# Patient Record
Sex: FEMALE | Race: WHITE | HISPANIC OR LATINO | Employment: UNEMPLOYED | URBAN - METROPOLITAN AREA
[De-identification: names, ages, dates, MRNs, and addresses within clinical notes are randomized per-mention and may not be internally consistent; named-entity substitution may affect disease eponyms.]

---

## 2017-03-07 RX ORDER — LANSOPRAZOLE 30 MG/1
CAPSULE, DELAYED RELEASE ORAL
Qty: 90 CAPSULE | Refills: 1 | OUTPATIENT
Start: 2017-03-07

## 2017-03-09 ENCOUNTER — TELEPHONE (OUTPATIENT)
Dept: FAMILY MEDICINE | Facility: CLINIC | Age: 42
End: 2017-03-09

## 2017-03-09 DIAGNOSIS — Z00.00 LABORATORY EXAM ORDERED AS PART OF ROUTINE GENERAL MEDICAL EXAMINATION: ICD-10-CM

## 2017-03-09 DIAGNOSIS — I10 BENIGN ESSENTIAL HTN: Primary | ICD-10-CM

## 2017-03-09 NOTE — TELEPHONE ENCOUNTER
Pt has appt soon and wants to do labs on Saturday. Orders entered. Left message that pt sched at [place] 3-11-17.--lp

## 2017-03-11 ENCOUNTER — LAB VISIT (OUTPATIENT)
Dept: LAB | Facility: HOSPITAL | Age: 42
End: 2017-03-11
Attending: FAMILY MEDICINE
Payer: COMMERCIAL

## 2017-03-11 DIAGNOSIS — Z00.00 LABORATORY EXAM ORDERED AS PART OF ROUTINE GENERAL MEDICAL EXAMINATION: ICD-10-CM

## 2017-03-11 DIAGNOSIS — I10 BENIGN ESSENTIAL HTN: ICD-10-CM

## 2017-03-11 LAB
ALBUMIN SERPL BCP-MCNC: 3.2 G/DL
ALP SERPL-CCNC: 70 U/L
ALT SERPL W/O P-5'-P-CCNC: 48 U/L
ANION GAP SERPL CALC-SCNC: 10 MMOL/L
AST SERPL-CCNC: 42 U/L
BASOPHILS # BLD AUTO: 0.01 K/UL
BASOPHILS NFR BLD: 0.1 %
BILIRUB SERPL-MCNC: 0.5 MG/DL
BUN SERPL-MCNC: 8 MG/DL
CALCIUM SERPL-MCNC: 9.1 MG/DL
CHLORIDE SERPL-SCNC: 105 MMOL/L
CHOLEST/HDLC SERPL: 3.6 {RATIO}
CO2 SERPL-SCNC: 24 MMOL/L
CREAT SERPL-MCNC: 0.7 MG/DL
DIFFERENTIAL METHOD: NORMAL
EOSINOPHIL # BLD AUTO: 0.1 K/UL
EOSINOPHIL NFR BLD: 1.8 %
ERYTHROCYTE [DISTWIDTH] IN BLOOD BY AUTOMATED COUNT: 14.2 %
EST. GFR  (AFRICAN AMERICAN): >60 ML/MIN/1.73 M^2
EST. GFR  (NON AFRICAN AMERICAN): >60 ML/MIN/1.73 M^2
GLUCOSE SERPL-MCNC: 129 MG/DL
HCT VFR BLD AUTO: 41.1 %
HDL/CHOLESTEROL RATIO: 28.1 %
HDLC SERPL-MCNC: 178 MG/DL
HDLC SERPL-MCNC: 50 MG/DL
HGB BLD-MCNC: 13.4 G/DL
LDLC SERPL CALC-MCNC: 103 MG/DL
LYMPHOCYTES # BLD AUTO: 2.6 K/UL
LYMPHOCYTES NFR BLD: 35.7 %
MCH RBC QN AUTO: 29.2 PG
MCHC RBC AUTO-ENTMCNC: 32.6 %
MCV RBC AUTO: 90 FL
MONOCYTES # BLD AUTO: 0.5 K/UL
MONOCYTES NFR BLD: 6.7 %
NEUTROPHILS # BLD AUTO: 4.1 K/UL
NEUTROPHILS NFR BLD: 55.4 %
NONHDLC SERPL-MCNC: 128 MG/DL
PLATELET # BLD AUTO: 285 K/UL
PMV BLD AUTO: 11 FL
POTASSIUM SERPL-SCNC: 4 MMOL/L
PROT SERPL-MCNC: 7.7 G/DL
RBC # BLD AUTO: 4.59 M/UL
SODIUM SERPL-SCNC: 139 MMOL/L
TRIGL SERPL-MCNC: 125 MG/DL
WBC # BLD AUTO: 7.34 K/UL

## 2017-03-11 PROCEDURE — 80053 COMPREHEN METABOLIC PANEL: CPT

## 2017-03-11 PROCEDURE — 80061 LIPID PANEL: CPT

## 2017-03-11 PROCEDURE — 36415 COLL VENOUS BLD VENIPUNCTURE: CPT | Mod: PO

## 2017-03-11 PROCEDURE — 83036 HEMOGLOBIN GLYCOSYLATED A1C: CPT

## 2017-03-11 PROCEDURE — 85025 COMPLETE CBC W/AUTO DIFF WBC: CPT

## 2017-03-13 LAB
ESTIMATED AVG GLUCOSE: 134 MG/DL
HBA1C MFR BLD HPLC: 6.3 %

## 2017-03-14 ENCOUNTER — PATIENT MESSAGE (OUTPATIENT)
Dept: FAMILY MEDICINE | Facility: CLINIC | Age: 42
End: 2017-03-14

## 2017-03-15 ENCOUNTER — DOCUMENTATION ONLY (OUTPATIENT)
Dept: ADMINISTRATIVE | Facility: HOSPITAL | Age: 42
End: 2017-03-15

## 2017-03-15 NOTE — PROGRESS NOTES
PRE-VISIT CHART REVIEW    Appointment Scheduled on 3/16/17    Department stratifications & guidelines reviewed:yes    Target Chronic Diagnosis: HTN, obesity    Chronic Diagnosis Intervention Due: no    Goals Updated:Yes    Health Maintenance Due   Topic Date Due    Influenza Vaccine  08/01/2016    Pap Smear  08/15/2016       Advanced Directives:   65 years of age or older?  No  Directive on file?  N\A                                      Pre-visit patient communication:  In Person    Studies or screenings scheduled pre-visit: no    Educate patient on HTN (122/84), obesity (33.05)

## 2017-03-16 ENCOUNTER — TELEPHONE (OUTPATIENT)
Dept: FAMILY MEDICINE | Facility: CLINIC | Age: 42
End: 2017-03-16

## 2017-03-16 ENCOUNTER — OFFICE VISIT (OUTPATIENT)
Dept: FAMILY MEDICINE | Facility: CLINIC | Age: 42
End: 2017-03-16
Payer: COMMERCIAL

## 2017-03-16 VITALS
SYSTOLIC BLOOD PRESSURE: 137 MMHG | BODY MASS INDEX: 33.73 KG/M2 | HEIGHT: 64 IN | TEMPERATURE: 98 F | DIASTOLIC BLOOD PRESSURE: 93 MMHG | HEART RATE: 79 BPM | RESPIRATION RATE: 20 BRPM | WEIGHT: 197.56 LBS

## 2017-03-16 DIAGNOSIS — R79.89 ELEVATED LFTS: ICD-10-CM

## 2017-03-16 DIAGNOSIS — Z00.00 ANNUAL PHYSICAL EXAM: Primary | ICD-10-CM

## 2017-03-16 DIAGNOSIS — Z12.4 PAP SMEAR FOR CERVICAL CANCER SCREENING: ICD-10-CM

## 2017-03-16 DIAGNOSIS — R73.03 PREDIABETES: ICD-10-CM

## 2017-03-16 PROCEDURE — 90686 IIV4 VACC NO PRSV 0.5 ML IM: CPT | Mod: S$GLB,,, | Performed by: FAMILY MEDICINE

## 2017-03-16 PROCEDURE — 90471 IMMUNIZATION ADMIN: CPT | Mod: S$GLB,,, | Performed by: FAMILY MEDICINE

## 2017-03-16 PROCEDURE — 81002 URINALYSIS NONAUTO W/O SCOPE: CPT | Mod: S$GLB,,, | Performed by: FAMILY MEDICINE

## 2017-03-16 PROCEDURE — 88175 CYTOPATH C/V AUTO FLUID REDO: CPT

## 2017-03-16 PROCEDURE — 3075F SYST BP GE 130 - 139MM HG: CPT | Mod: S$GLB,,, | Performed by: FAMILY MEDICINE

## 2017-03-16 PROCEDURE — 3080F DIAST BP >= 90 MM HG: CPT | Mod: S$GLB,,, | Performed by: FAMILY MEDICINE

## 2017-03-16 PROCEDURE — 99396 PREV VISIT EST AGE 40-64: CPT | Mod: 25,S$GLB,, | Performed by: FAMILY MEDICINE

## 2017-03-16 RX ORDER — TRIAMTERENE/HYDROCHLOROTHIAZID 37.5-25 MG
1 TABLET ORAL DAILY
Qty: 30 TABLET | Refills: 2 | Status: SHIPPED | OUTPATIENT
Start: 2017-03-16 | End: 2017-07-25 | Stop reason: SDUPTHER

## 2017-03-16 NOTE — TELEPHONE ENCOUNTER
----- Message from Kathleen Santiago sent at 3/16/2017 12:20 PM CDT -----  Patient stated pharmacy needs authorization for prescription for diet medication: Contrave/uses [facility] on [address] in [place]/please order or if any questions call back at [phone].

## 2017-03-16 NOTE — MR AVS SNAPSHOT
[facility]  [address]  [place] [address]  Phone: [phone]  Fax: [phone]                  Ana Dorantes   3/16/2017 10:30 AM   Office Visit    Description:  Female : 1975   Provider:  Gina Barrios MD   Department:  [facility]           Reason for Visit     Annual Exam           Diagnoses this Visit        Comments    Annual physical exam    -  Primary     Prediabetes         Elevated LFTs         BMI 33.0-33.9,adult         Pap smear for cervical cancer screening                To Do List           Future Appointments        Provider Department Dept Phone    6/15/2017 10:15 AM [facility] [place] [facility] [phone]      Goals (5 Years of Data)              Today    3/14/16    8/21/15    Blood Pressure < 140/90   137/93  137/93  137/93    BMI is less than 25       On track       These Medications        Disp Refills Start End    triamterene-hydrochlorothiazide 37.5-25 mg (MAXZIDE-25) 37.5-25 mg per tablet 30 tablet 2 3/16/2017 3/16/2018    Take 1 tablet by mouth once daily. - Oral    Pharm[id number] [place] [address] Ph #: [phone]       ranitidine (ZANTAC) 300 MG tablet 30 tablet 2 3/16/2017 3/16/2018    Take 1 tablet (300 mg total) by mouth every evening. - Oral    Pharmacy: [facility]/pharmacy #[id number]  [place] [address] Ph #: [phone]       naltrexone-bupropion 8-90 mg TbSR 120 tablet 0 3/16/2017     One tablet once daily in the morning for 1 week; at week 2, increase to 1 tablet twice daily administered in the morning and evening and continue for 1 week; at week 3, increase to 2 tablets in the morning and 1 tablet in the evening and continue for 1 week; at week 4, increase to 2 tablets twice daily administered in the morning and evening and continue for the remainder of the treatment course    Pharmacy: [id number] [place] [address]   #: [phone]         [facility]     [facility] Care [facility] - 24/7 Assistance  Registered nurses in the [facility] provide clinical advisement, health education, appointment booking, and other advisory services.  Call for this free service at [phone].             Medications           Message regarding Medications     Verify the changes and/or additions to your medication regime listed below are the same as discussed with your clinician today.  If any of these changes or additions are incorrect, please notify your healthcare provider.        START taking these NEW medications        Refills    triamterene-hydrochlorothiazide 37.5-25 mg (MAXZIDE-25) 37.5-25 mg per tablet 2    Sig: Take 1 tablet by mouth once daily.    Class: Normal    Route: Oral    ranitidine (ZANTAC) 300 MG tablet 2    Sig: Take 1 tablet (300 mg total) by mouth every evening.    Class: Normal    Route: Oral    naltrexone-bupropion 8-90 mg TbSR 0    Sig: One tablet once daily in the morning for 1 week; at week 2, increase to 1 tablet twice daily administered in the morning and evening and continue for 1 week; at week 3, increase to 2 tablets in the morning and 1 tablet in the evening and continue for 1 week; at week 4, increase to 2 tablets twice daily administered in the morning and evening and continue for the remainder of the treatment course    Class: Print      STOP taking these medications     hydrochlorothiazide (MICROZIDE) 12.5 mg capsule TAKE 1 CAPSULE EVERY DAY    hydrochlorothiazide (MICROZIDE) 12.5 mg capsule TAKE 1 CAPSULE EVERY DAY           Verify that the below list of medications is an accurate representation of the medications you are currently taking.  If none reported, the list may be blank. If incorrect, please contact your healthcare provider. Carry this list with you in case of emergency.           Current Medications     fluticasone (FLONASE) 50 mcg/actuation nasal spray 2 sprays by Each  "Nare route once daily.    lansoprazole (PREVACID) 30 MG capsule TAKE 1 CAPSULE (30 MG TOTAL) BY MOUTH ONCE DAILY.    naltrexone-bupropion 8-90 mg TbSR One tablet once daily in the morning for 1 week; at week 2, increase to 1 tablet twice daily administered in the morning and evening and continue for 1 week; at week 3, increase to 2 tablets in the morning and 1 tablet in the evening and continue for 1 week; at week 4, increase to 2 tablets twice daily administered in the morning and evening and continue for the remainder of the treatment course    ranitidine (ZANTAC) 300 MG tablet Take 1 tablet (300 mg total) by mouth every evening.    triamterene-hydrochlorothiazide 37.5-25 mg (MAXZIDE-25) 37.5-25 mg per tablet Take 1 tablet by mouth once daily.           Clinical Reference Information           Your Vitals Were     BP Pulse Temp Resp Height Weight    137/93 79 97.7 °F (36.5 °C) 20 5' 4" (1.626 m) 89.6 kg (197 lb 8.5 oz)    Last Period BMI             02/23/2017 33.91 kg/m2         Blood Pressure          Most Recent Value    BP  (!)  137/93      Allergies as of 3/16/2017     No Known Drug Allergies      Immunizations Administered on Date of Encounter - 3/16/2017     None      Orders Placed During Today's Visit      Normal Orders This Visit    Liquid-based pap smear, screening     Future Labs/Procedures Expected by Expires    Hemoglobin A1c  3/16/2017 3/17/2018    Hepatic function panel  3/16/2017 3/17/2018      Language Assistance Services     ATTENTION: Language assistance services are available, free of charge. Please call [phone].      ATENCIÓN: Si habla español, tiene a hester disposición servicios gratuitos de asistencia lingüística. Llame al [phone].     CHÚ Ý: N?u b?n nói Ti?ng Vi?t, có các d?ch v? h? tr? ngôn ng? mi?n phí dành cho b?n. G?i s? [phone].         [facility] complies with applicable Federal civil rights laws and does not discriminate on the basis of race, " color, national origin, age, disability, or sex.

## 2017-03-18 NOTE — PROGRESS NOTES
Subjective:       Patient ID: Ana Dorantes is a 41 y.o. female.    Chief Complaint: Annual Exam (with a pap and urinalysis)    HPI   The patient is a 41-year-old who is here today for her annual exam.  She would like to have her Pap smear done.  She did have one prior abnormal Pap smear but when it was repeated it was normal with no treatment needed.  She is having her menstrual cycles regularly.  Her  has previously had a vasectomy.  She recently had labs which were reviewed today.  She is trying to watch her diet and exercise but is frustrated she's not been able to lose weight.  She wonders about a medication to help her lose weight.    Today we also discussed the followin)  hypertension.  Her blood pressure is high today.  She is currently taking HCTZ 12.5 mg once a day  2)  prediabetes.  We did discuss her recent A1c of 6.3 and her fasting blood 129.  She denies any polyuria, polydipsia or polyphagia  3)  SONI.  She is using Prevacid several times throughout the week.  We did discuss moving to a non-PPI medication which she would be interested in doing   4)  elevated LFTs.  We did discuss her recent slightly elevated LFTs and the fact that this is likely linked to the fatty liver previously noted on ultrasound.      Review of Systems   Constitutional: Negative for appetite change, chills, diaphoresis, fatigue, fever and unexpected weight change.   HENT: Negative for congestion, dental problem, ear pain, hearing loss, postnasal drip, rhinorrhea, sneezing, sore throat and trouble swallowing.    Eyes: Negative for photophobia, pain, discharge and visual disturbance.   Respiratory: Negative for cough, chest tightness, shortness of breath and wheezing.    Cardiovascular: Negative for chest pain, palpitations and leg swelling.   Gastrointestinal: Negative for abdominal distention, abdominal pain, blood in stool, constipation, diarrhea, nausea and vomiting.   Endocrine: Negative for cold intolerance, heat  intolerance, polydipsia and polyuria.   Genitourinary: Negative for dysuria, flank pain, frequency, genital sores, hematuria, menstrual problem and vaginal discharge.   Musculoskeletal: Negative for arthralgias, joint swelling and myalgias.   Skin: Negative for rash.   Neurological: Negative for dizziness, syncope, light-headedness and headaches.   Hematological: Negative for adenopathy. Does not bruise/bleed easily.   Psychiatric/Behavioral: Negative for dysphoric mood, self-injury, sleep disturbance and suicidal ideas. The patient is not nervous/anxious.        Objective:      Physical Exam   Constitutional: She is oriented to person, place, and time. She appears well-developed and well-nourished. No distress.   HENT:   Head: Normocephalic and atraumatic.   Right Ear: Hearing, tympanic membrane, external ear and ear canal normal.   Left Ear: Hearing, tympanic membrane, external ear and ear canal normal.   Nose: Nose normal.   Mouth/Throat: Oropharynx is clear and moist and mucous membranes are normal. No oral lesions. No oropharyngeal exudate, posterior oropharyngeal edema or posterior oropharyngeal erythema.   Eyes: Conjunctivae, EOM and lids are normal. Pupils are equal, round, and reactive to light. No scleral icterus.   Neck: Normal range of motion. Neck supple. Carotid bruit is not present. No thyroid mass and no thyromegaly present.   Cardiovascular: Normal rate, regular rhythm and normal heart sounds.   No extrasystoles are present. PMI is not displaced.  Exam reveals no gallop.    No murmur heard.  Pulses:       Dorsalis pedis pulses are 2+ on the right side, and 2+ on the left side.        Posterior tibial pulses are 2+ on the right side, and 2+ on the left side.   Pulmonary/Chest: Effort normal and breath sounds normal. No accessory muscle usage. No respiratory distress.   Clear to auscultation bilaterally.   Abdominal: Soft. Normal appearance and bowel sounds are normal. She exhibits no abdominal bruit.  "There is no hepatosplenomegaly. There is no tenderness. There is no rebound.   Genitourinary: Pelvic exam was performed with patient supine. There is no rash or lesion on the right labia. There is no rash or lesion on the left labia.   Genitourinary Comments: External genitalia appear normal.  Speculum is inserted.  Vaginal mucosa is normal.  Cervix is visualized and appears normal.  Pap is taken.  Bimanual exam reveals a normal sized uterus and ovaries which are non tender.   Feet:   Right Foot:   Skin Integrity: Positive for warmth and dry skin.   Lymphadenopathy:        Head (right side): No submental, no submandibular, no preauricular, no posterior auricular and no occipital adenopathy present.        Head (left side): No submental, no submandibular, no preauricular and no occipital adenopathy present.     She has no cervical adenopathy.        Right cervical: No superficial cervical, no deep cervical and no posterior cervical adenopathy present.       Left cervical: No superficial cervical, no deep cervical and no posterior cervical adenopathy present.     She has no axillary adenopathy.        Right: No supraclavicular adenopathy present.        Left: No supraclavicular adenopathy present.   Neurological: She is alert and oriented to person, place, and time. She has normal strength. No cranial nerve deficit or sensory deficit.   Skin: Skin is warm, dry and intact. No cyanosis. Nails show no clubbing.   Psychiatric: She has a normal mood and affect. Her speech is normal and behavior is normal. Thought content normal. Cognition and memory are normal.   Breast:  The breasts appear symmetric.  There is no nipple discharge or nipple inversion noted.  There are no masses palpable throughout either breast.  There is no supraclavicular or axillary lymphadenopathy.    Blood pressure (!) 137/93, pulse 79, temperature 97.7 °F (36.5 °C), resp. rate 20, height 5' 4" (1.626 m), weight 89.6 kg (197 lb 8.5 oz), last menstrual " period 02/23/2017.Body mass index is 33.91 kg/(m^2).        A/P:  1)  annual exam.  Health maintenance issues and anticipatory guidance issues were discussed.  Immunizations are up-to-date.  If her Pap smear is normal today we will repeat this in 3 years.  She will work on diet exercise and weight loss  2)  obesity with a BMI of 33.  We did discuss treatment options.  In the end, she wanted to try contrave.  We will see her back in 3 months to see how she is doing with this   3)  hypertension.  Uncontrolled.  We will change her blood pressure medicine to Maxide 37.5/25 mg once a day  4)  prediabetes.  Progressive.  She will work on diet exercise and weight loss to prevent the development of diabetes in the future.  We will recheck this in 3 months  5)  SONI.  Intermittent.  She'll work harder on lifestyle interventions to control her acid reflux symptoms.  We will stop the Prevacid and try Zantac 300 milligrams once a day as needed  6)  elevated LFTs likely due to fatty liver.  Mild.  We will recheck this in 3 months

## 2017-03-22 ENCOUNTER — PATIENT MESSAGE (OUTPATIENT)
Dept: FAMILY MEDICINE | Facility: CLINIC | Age: 42
End: 2017-03-22

## 2017-03-23 LAB
BILIRUB SERPL-MCNC: NORMAL MG/DL
BLOOD URINE, POC: NORMAL
COLOR, POC UA: CLEAR
GLUCOSE UR QL STRIP: NORMAL
KETONES UR QL STRIP: NORMAL
LEUKOCYTE ESTERASE URINE, POC: NORMAL
NITRITE, POC UA: NORMAL
PH, POC UA: 5
PROTEIN, POC: NORMAL
SPECIFIC GRAVITY, POC UA: 1.02
UROBILINOGEN, POC UA: NORMAL

## 2017-03-27 NOTE — TELEPHONE ENCOUNTER
Contrave has been approved for 4 mths.  Spoke w/ Oseas at [facility].--lp

## 2017-03-30 ENCOUNTER — TELEPHONE (OUTPATIENT)
Dept: FAMILY MEDICINE | Facility: CLINIC | Age: 42
End: 2017-03-30

## 2017-03-30 NOTE — TELEPHONE ENCOUNTER
Spoke with pt. Rescheduled nurse visit as pt did not want to drive in the weather. Pt verbalized understanding to new appointment day & time.

## 2017-03-30 NOTE — TELEPHONE ENCOUNTER
----- Message from Alyssa Sapp sent at 3/30/2017  9:48 AM CDT -----  Contact: self [phone]  She is requesting that you call her to reschedule the BP check appt.  She had to cancel due to the weather.  Thank you!

## 2017-04-06 ENCOUNTER — PATIENT MESSAGE (OUTPATIENT)
Dept: FAMILY MEDICINE | Facility: CLINIC | Age: 42
End: 2017-04-06

## 2017-04-10 ENCOUNTER — PATIENT MESSAGE (OUTPATIENT)
Dept: FAMILY MEDICINE | Facility: CLINIC | Age: 42
End: 2017-04-10

## 2017-04-12 ENCOUNTER — DOCUMENTATION ONLY (OUTPATIENT)
Dept: FAMILY MEDICINE | Facility: CLINIC | Age: 42
End: 2017-04-12

## 2017-04-13 VITALS — DIASTOLIC BLOOD PRESSURE: 85 MMHG | SYSTOLIC BLOOD PRESSURE: 121 MMHG

## 2017-04-28 ENCOUNTER — PATIENT MESSAGE (OUTPATIENT)
Dept: FAMILY MEDICINE | Facility: CLINIC | Age: 42
End: 2017-04-28

## 2017-05-02 RX ORDER — NALTREXONE HYDROCHLORIDE AND BUPROPION HYDROCHLORIDE 8; 90 MG/1; MG/1
TABLET, EXTENDED RELEASE ORAL
Qty: 120 TABLET | Refills: 0 | OUTPATIENT
Start: 2017-05-02

## 2017-05-03 ENCOUNTER — PATIENT MESSAGE (OUTPATIENT)
Dept: FAMILY MEDICINE | Facility: CLINIC | Age: 42
End: 2017-05-03

## 2017-05-03 RX ORDER — SULFAMETHOXAZOLE AND TRIMETHOPRIM 800; 160 MG/1; MG/1
1 TABLET ORAL 2 TIMES DAILY
Qty: 6 TABLET | Refills: 0 | Status: SHIPPED | OUTPATIENT
Start: 2017-05-03 | End: 2017-05-06

## 2017-05-04 ENCOUNTER — PATIENT MESSAGE (OUTPATIENT)
Dept: FAMILY MEDICINE | Facility: CLINIC | Age: 42
End: 2017-05-04

## 2017-05-04 NOTE — TELEPHONE ENCOUNTER
Spoke w/ Ana at [facility] , they have received rx and it is ready to be picked up.--lp

## 2017-06-19 ENCOUNTER — OFFICE VISIT (OUTPATIENT)
Dept: PRIMARY CARE CLINIC | Facility: CLINIC | Age: 42
End: 2017-06-19
Payer: COMMERCIAL

## 2017-06-19 VITALS
DIASTOLIC BLOOD PRESSURE: 90 MMHG | HEART RATE: 87 BPM | SYSTOLIC BLOOD PRESSURE: 138 MMHG | WEIGHT: 189.81 LBS | BODY MASS INDEX: 32.41 KG/M2 | HEIGHT: 64 IN | TEMPERATURE: 99 F | OXYGEN SATURATION: 95 %

## 2017-06-19 DIAGNOSIS — R10.13 EPIGASTRIC PAIN: Primary | ICD-10-CM

## 2017-06-19 PROCEDURE — 99999 PR PBB SHADOW E&M-EST. PATIENT-LVL IV: CPT | Mod: PBBFAC,,, | Performed by: NURSE PRACTITIONER

## 2017-06-19 PROCEDURE — 99214 OFFICE O/P EST MOD 30 MIN: CPT | Mod: S$GLB,,, | Performed by: NURSE PRACTITIONER

## 2017-06-19 RX ORDER — ESOMEPRAZOLE MAGNESIUM 40 MG/1
40 CAPSULE, DELAYED RELEASE ORAL DAILY PRN
COMMUNITY
End: 2018-03-16 | Stop reason: ALTCHOICE

## 2017-06-19 NOTE — MEDICAL/APP STUDENT
Subjective:       Patient ID: Ana Dorantes is a 42 y.o. female.    Chief Complaint: Back Pain (past 3-4 weeks) and Abdominal Pain  Ms. Galindo is here today due to acute abdominal and back pain that occurs in her epigastric region and under her posterior rib cage.  It is sporadic nature, although her  feels it maybe associated with eating. Currently, she is not able to finish a meal and takes about 3-4 bites prior to being full. Initially she thought the pain was associated with her menses, however it has continued. She cut out taking her Contrave for a few days to see if this was irritating her stomach and found no relief.     Back Pain   This is a new problem. The current episode started 1 to 4 weeks ago. The problem occurs 2 to 4 times per day. The problem has been gradually worsening since onset. The quality of the pain is described as cramping. Radiates to: Epigastric- in band like pattern. The pain is at a severity of 10/10. The pain is the same all the time. Associated symptoms include abdominal pain. Pertinent negatives include no bowel incontinence, chest pain, dysuria, fever, headaches or weight loss. Risk factors include obesity. She has tried heat for the symptoms. The treatment provided mild relief.   Abdominal Pain   This is a new problem. The current episode started 1 to 4 weeks ago. The onset quality is sudden. The problem occurs 2 to 4 times per day. The problem has been gradually worsening. The pain is located in the epigastric region. The pain is at a severity of 10/10. The quality of the pain is cramping and a sensation of fullness. The abdominal pain radiates to the back. Associated symptoms include anorexia and nausea. Pertinent negatives include no arthralgias, belching, constipation, diarrhea, dysuria, fever, flatus, frequency, headaches, hematochezia, hematuria, myalgias, vomiting or weight loss. Nothing aggravates the pain. The pain is relieved by nothing. She has tried antacids,  proton pump inhibitors and H2 blockers for the symptoms. The treatment provided no relief. Her past medical history is significant for abdominal surgery.     Review of Systems   Constitutional: Positive for appetite change. Negative for activity change, chills, diaphoresis, fatigue, fever and weight loss.   HENT: Negative for postnasal drip, sore throat, tinnitus, trouble swallowing and voice change.    Respiratory: Negative for cough, chest tightness and shortness of breath.    Cardiovascular: Positive for palpitations. Negative for chest pain.   Gastrointestinal: Positive for abdominal distention, abdominal pain, anorexia and nausea. Negative for bowel incontinence, constipation, diarrhea, flatus, hematochezia and vomiting.   Genitourinary: Negative for dysuria, flank pain, frequency and hematuria.   Musculoskeletal: Positive for back pain. Negative for arthralgias and myalgias.   Allergic/Immunologic: Negative for environmental allergies, food allergies and immunocompromised state.   Neurological: Negative for headaches.       Objective:      Physical Exam   Constitutional: She is oriented to person, place, and time. She appears well-developed and well-nourished. No distress.   HENT:   Head: Normocephalic and atraumatic.   Eyes: Pupils are equal, round, and reactive to light.   Neck: Normal range of motion. Neck supple.   Cardiovascular: Regular rhythm, normal heart sounds and intact distal pulses.    Pulmonary/Chest: Effort normal and breath sounds normal.   Abdominal: Soft. Normal appearance and bowel sounds are normal. She exhibits no distension and no mass. There is tenderness in the epigastric area and left upper quadrant. There is no rigidity, no rebound, no guarding and no CVA tenderness. No hernia.       Neurological: She is alert and oriented to person, place, and time.   Skin: Skin is warm and dry. She is not diaphoretic.   Vitals reviewed.      Assessment:         ICD-10-CM ICD-9-CM   1. Epigastric  pain R10.13 789.06       Plan:       Epigastric pain  -     US Abdomen Limited; Future; Expected date: 06/19/2017    -Discussion on lack of specific exam finding.  -Education provided on use of H2 blocker and/or PPI. Encouraged daily use to help eliminate possible symptoms of gastric irritation and GERD.  -Ultrasound ordered to eliminate gallstones as origin of pain.

## 2017-06-19 NOTE — PATIENT INSTRUCTIONS
Recommendations for reduction of or relief from reflux/stomach lining inflammation are below.  Avoid:  Caffeine  Eating less than 1.5 hrs before bedtime.  Mints  Chocolates  Alcohol  Smoking  Any food that increases the abdominal pain.    Eat smaller, more frequent meals.    Take the following medications: prilosec.  It is best to take it 30 minutes prior to eating in the morning.  You may add zantac 150 mg once or twice a day, if desired.     If this does not resolve your symptoms, or if symptoms worsen, call your PCP for further evaluation or call us for a GI referral.    Thank you for using the [facility]!    HOLLIE Mason, CNP, FNP-BC  [facility]  [facility]

## 2017-06-19 NOTE — Clinical Note
Gina Barrios MD,  I saw your patient today in the [facility].  If you have any questions, please do not hesitate to contact me.  Thank you!  JAZLYN Mason

## 2017-06-22 ENCOUNTER — HOSPITAL ENCOUNTER (OUTPATIENT)
Dept: RADIOLOGY | Facility: HOSPITAL | Age: 42
Discharge: HOME | End: 2017-06-22
Attending: NURSE PRACTITIONER
Payer: COMMERCIAL

## 2017-06-22 DIAGNOSIS — R10.13 EPIGASTRIC PAIN: ICD-10-CM

## 2017-06-22 PROCEDURE — 76705 ECHO EXAM OF ABDOMEN: CPT | Mod: TC,PO

## 2017-06-22 PROCEDURE — 76705 ECHO EXAM OF ABDOMEN: CPT | Mod: 26,,, | Performed by: RADIOLOGY

## 2017-06-22 NOTE — PROGRESS NOTES
Subjective:       Patient ID: Ana Dorantes is a 42 y.o. female.     Chief Complaint: Back Pain (past 3-4 weeks) and Abdominal Pain  Ms. Galindo is here today due to acute abdominal and back pain that occurs in her epigastric region and under her posterior rib cage.  It is sporadic nature, although her  feels it maybe associated with eating. Currently, she is not able to finish a meal and takes about 3-4 bites prior to being full. Initially she thought the pain was associated with her menses, however it has continued. She cut out taking her Contrave for a few days to see if this was irritating her stomach and found no relief.      Back Pain   This is a new problem. The current episode started 1 to 4 weeks ago. The problem occurs 2 to 4 times per day. The problem has been gradually worsening since onset. The quality of the pain is described as cramping. Radiates to: Epigastric- in band like pattern. The pain is at a severity of 10/10. The pain is the same all the time. Associated symptoms include abdominal pain. Pertinent negatives include no bowel incontinence, chest pain, dysuria, fever, headaches or weight loss. Risk factors include obesity. She has tried heat for the symptoms. The treatment provided mild relief.   Abdominal Pain   This is a new problem. The current episode started 1 to 4 weeks ago. The onset quality is sudden. The problem occurs 2 to 4 times per day. The problem has been gradually worsening. The pain is located in the epigastric region. The pain is at a severity of 10/10. The quality of the pain is cramping and a sensation of fullness. The abdominal pain radiates to the back. Associated symptoms include anorexia and nausea. Pertinent negatives include no arthralgias, belching, constipation, diarrhea, dysuria, fever, flatus, frequency, headaches, hematochezia, hematuria, myalgias, vomiting or weight loss. Nothing aggravates the pain. The pain is relieved by nothing. She has tried  antacids, proton pump inhibitors and H2 blockers for the symptoms. The treatment provided no relief. Her past medical history is significant for abdominal surgery.      Review of Systems   Constitutional: Positive for appetite change. Negative for activity change, chills, diaphoresis, fatigue, fever and weight loss.   HENT: Negative for postnasal drip, sore throat, tinnitus, trouble swallowing and voice change.    Respiratory: Negative for cough, chest tightness and shortness of breath.    Cardiovascular: Positive for palpitations. Negative for chest pain.   Gastrointestinal: Positive for abdominal distention, abdominal pain, anorexia and nausea. Negative for bowel incontinence, constipation, diarrhea, flatus, hematochezia and vomiting.   Genitourinary: Negative for dysuria, flank pain, frequency and hematuria.   Musculoskeletal: Positive for back pain. Negative for arthralgias and myalgias.   Allergic/Immunologic: Negative for environmental allergies, food allergies and immunocompromised state.   Neurological: Negative for headaches.       Objective:      Physical Exam   Constitutional: She is oriented to person, place, and time. She appears well-developed and well-nourished. No distress.   HENT:   Head: Normocephalic and atraumatic.   Eyes: Pupils are equal, round, and reactive to light.   Neck: Normal range of motion. Neck supple.   Cardiovascular: Regular rhythm, normal heart sounds and intact distal pulses.    Pulmonary/Chest: Effort normal and breath sounds normal.   Abdominal: Soft. Normal appearance and bowel sounds are normal. She exhibits no distension and no mass. There is tenderness in the epigastric area and left upper quadrant. There is no rigidity, no rebound, no guarding and no CVA tenderness. No hernia.       Neurological: She is alert and oriented to person, place, and time.   Skin: Skin is warm and dry. She is not diaphoretic.   Vitals:    06/19/17 1233   BP: (!) 138/90   BP Location: Right arm  "  Patient Position: Sitting   BP Method: Manual   Pulse: 87   Temp: 98.5 °F (36.9 °C)   TempSrc: Oral   SpO2: 95%   Weight: 86.1 kg (189 lb 13.1 oz)   Height: 5' 4" (1.626 m)       Vitals reviewed.      Assessment:       Epigastric pain  -     US Abdomen Limited; Future; Expected date: 06/19/2017      Plan:       Pt today presents with 3-4 week of intermittent but sharp and severe (10/10) epigastric and back pain that seems to occur after eating only a few bites.      This is a new problem to me and the following work up is planned.    Lab & Radiological Tests Ordered: US abd.  The results are pending.    -Discussion on lack of specific exam finding.  -Education provided on use of H2 blocker and/or PPI. Encouraged daily use to help eliminate possible symptoms of gastric irritation and GERD.  -Ultrasound ordered to eliminate gallstones as origin of pain.    Pt advised to perform comfort measures recommended on patient instruction sheet .      If worse or concerns, the patient is advised to call us.   Explained exam findings, diagnosis and treatment plan to patient and her .  Questions answered and patient states understanding.            "

## 2017-07-07 RX ORDER — TRIAMTERENE/HYDROCHLOROTHIAZID 37.5-25 MG
1 TABLET ORAL DAILY
Qty: 30 TABLET | Refills: 2 | OUTPATIENT
Start: 2017-07-07 | End: 2018-07-07

## 2017-07-07 NOTE — TELEPHONE ENCOUNTER
Spoke w/ pt , pt feels fine. Pt did not request med ( pharmacy must have on auto-refill)  because she still has some.  She has not been checking her BP regularly. Pt will check her BP once she gets home and send us an e-mail.--lp

## 2017-07-18 ENCOUNTER — TELEPHONE (OUTPATIENT)
Dept: FAMILY MEDICINE | Facility: CLINIC | Age: 42
End: 2017-07-18

## 2017-07-18 DIAGNOSIS — Z12.39 ENCOUNTER FOR SCREENING FOR MALIGNANT NEOPLASM OF BREAST: Primary | ICD-10-CM

## 2017-07-18 NOTE — TELEPHONE ENCOUNTER
----- Message from Brooklyn Arreguin sent at 2017 12:57 PM CDT -----  Contact: self   [phone]  Patient called asking to get new orders for mammogram. Current orders for mammogram .Thanks!

## 2017-07-22 ENCOUNTER — HOSPITAL ENCOUNTER (OUTPATIENT)
Dept: RADIOLOGY | Facility: HOSPITAL | Age: 42
Discharge: HOME | End: 2017-07-22
Attending: FAMILY MEDICINE
Payer: COMMERCIAL

## 2017-07-22 VITALS — WEIGHT: 189.81 LBS | HEIGHT: 64 IN | BODY MASS INDEX: 32.41 KG/M2

## 2017-07-22 DIAGNOSIS — Z12.39 ENCOUNTER FOR SCREENING FOR MALIGNANT NEOPLASM OF BREAST: ICD-10-CM

## 2017-07-22 PROCEDURE — 77067 SCR MAMMO BI INCL CAD: CPT | Mod: 26,,, | Performed by: RADIOLOGY

## 2017-07-22 PROCEDURE — 77067 SCR MAMMO BI INCL CAD: CPT | Mod: TC

## 2017-07-22 PROCEDURE — 77063 BREAST TOMOSYNTHESIS BI: CPT | Mod: 26,,, | Performed by: RADIOLOGY

## 2017-07-24 ENCOUNTER — PATIENT MESSAGE (OUTPATIENT)
Dept: FAMILY MEDICINE | Facility: CLINIC | Age: 42
End: 2017-07-24

## 2017-07-26 ENCOUNTER — PATIENT MESSAGE (OUTPATIENT)
Dept: FAMILY MEDICINE | Facility: CLINIC | Age: 42
End: 2017-07-26

## 2017-07-26 RX ORDER — TRIAMTERENE/HYDROCHLOROTHIAZID 37.5-25 MG
1 TABLET ORAL DAILY
Qty: 30 TABLET | Refills: 2 | Status: SHIPPED | OUTPATIENT
Start: 2017-07-26 | End: 2017-08-02 | Stop reason: SDUPTHER

## 2017-07-27 ENCOUNTER — TELEPHONE (OUTPATIENT)
Dept: FAMILY MEDICINE | Facility: CLINIC | Age: 42
End: 2017-07-27

## 2017-07-28 ENCOUNTER — DOCUMENTATION ONLY (OUTPATIENT)
Dept: FAMILY MEDICINE | Facility: CLINIC | Age: 42
End: 2017-07-28

## 2017-07-28 VITALS — DIASTOLIC BLOOD PRESSURE: 78 MMHG | SYSTOLIC BLOOD PRESSURE: 124 MMHG

## 2017-07-28 RX ORDER — TRIAMTERENE/HYDROCHLOROTHIAZID 37.5-25 MG
1 TABLET ORAL DAILY
Qty: 30 TABLET | Refills: 2 | OUTPATIENT
Start: 2017-07-28 | End: 2018-07-28

## 2017-08-02 NOTE — TELEPHONE ENCOUNTER
----- Message from Noel Turk sent at 8/2/2017  3:39 PM CDT -----  Contact: patient  Patient called to advise that request for script was requesting last week,[facility] (triamterene).please send ,call back at [phone] when script has been sent. Thanks,

## 2017-08-03 RX ORDER — TRIAMTERENE/HYDROCHLOROTHIAZID 37.5-25 MG
1 TABLET ORAL DAILY
Qty: 30 TABLET | Refills: 2 | Status: SHIPPED | OUTPATIENT
Start: 2017-08-03 | End: 2017-08-30 | Stop reason: SDUPTHER

## 2017-08-31 RX ORDER — TRIAMTERENE/HYDROCHLOROTHIAZID 37.5-25 MG
1 TABLET ORAL DAILY
Qty: 90 TABLET | Refills: 0 | Status: SHIPPED | OUTPATIENT
Start: 2017-08-31 | End: 2018-01-19 | Stop reason: SDUPTHER

## 2017-09-07 ENCOUNTER — OFFICE VISIT (OUTPATIENT)
Dept: FAMILY MEDICINE | Facility: CLINIC | Age: 42
End: 2017-09-07
Payer: COMMERCIAL

## 2017-09-07 VITALS
BODY MASS INDEX: 33.37 KG/M2 | HEIGHT: 64 IN | RESPIRATION RATE: 16 BRPM | HEART RATE: 97 BPM | TEMPERATURE: 98 F | DIASTOLIC BLOOD PRESSURE: 78 MMHG | WEIGHT: 195.44 LBS | OXYGEN SATURATION: 98 % | SYSTOLIC BLOOD PRESSURE: 122 MMHG

## 2017-09-07 DIAGNOSIS — G47.33 OSA (OBSTRUCTIVE SLEEP APNEA): ICD-10-CM

## 2017-09-07 DIAGNOSIS — K13.21 LEUKOPLAKIA OF GINGIVA: ICD-10-CM

## 2017-09-07 DIAGNOSIS — L50.9 URTICARIA: Primary | ICD-10-CM

## 2017-09-07 DIAGNOSIS — I10 HYPERTENSION, ESSENTIAL: ICD-10-CM

## 2017-09-07 PROCEDURE — 3078F DIAST BP <80 MM HG: CPT | Mod: S$GLB,,, | Performed by: FAMILY MEDICINE

## 2017-09-07 PROCEDURE — 99214 OFFICE O/P EST MOD 30 MIN: CPT | Mod: S$GLB,,, | Performed by: FAMILY MEDICINE

## 2017-09-07 PROCEDURE — 3008F BODY MASS INDEX DOCD: CPT | Mod: S$GLB,,, | Performed by: FAMILY MEDICINE

## 2017-09-07 PROCEDURE — 3074F SYST BP LT 130 MM HG: CPT | Mod: S$GLB,,, | Performed by: FAMILY MEDICINE

## 2017-09-07 RX ORDER — EPINEPHRINE 0.3 MG/.3ML
1 INJECTION SUBCUTANEOUS ONCE
Qty: 2 DEVICE | Refills: 0 | Status: SHIPPED | OUTPATIENT
Start: 2017-09-07 | End: 2020-02-28 | Stop reason: SDUPTHER

## 2017-09-10 NOTE — PROGRESS NOTES
Subjective:       Patient ID: Ana Dorantes is a 42 y.o. female.    Chief Complaint: Allergic Reaction (state the last 3 times she ate eggs broke out in hives. Sleep apnea)    HPI     The patient is a 42-year-old who is here today to discuss several issues.  Today we discussed the followin)  egg ALLERGY.  She wonders if she has an egg ALLERGY.  Recently, she had an episode of itchiness after she ate an egg.  Initially, she did not attribute her itchiness to eating the egg but after some subsequent episodes she wonders if this was the start of her egg ALLERGY.  Approximately 3 days after she had the itchiness after eating the eggs, she ate 3 eggs and within 2-3 minutes, she developed hives which became widespread and she developed swelling of her lip.  She was about to go to the ER but a friend of hers who is a nurse suggested she take Benadryl and Zantac which did help.  A couple of days later, she ate eggs again and within 2 bites she developed a rash and swelling again.  She has never had any swelling of her tongue or throat or any shortness of breath.  She's never had problems like this before  2)  sleep apnea.  She wonders about having a sleep apnea evaluation done.  Her  has commented that she has been snoring and pausing in her breathing at night.  She has also occasionally woken up from sleep gasping for breath  3)  obesity.  She has not been able to lose weight.  She only took contrave for short period of time before deciding to stop it because she was traveling a lot over the summer and realized that she would have trouble losing the weight because of her travels.  She realizes that she does need to lose weight and wants to resume the contrave again after an upcoming trip she has planned.  We did discuss repeating labs to evaluate her cholesterol, prediabetes and elevated LFTs previously attributed to fatty liver disease but she wants to defer that until she has a chance to make a concerted  effort at weight  4)  hypertension.  She is doing well with the Maxide.  She has not felt as her blood pressure has been high or low    During her exam, I did comment about a white patch she has on her gums.  She has noticed this intermittently before and discussed it with her dentist but the dentist was not sure what this was    Review of Systems   Constitutional: Negative for activity change, appetite change, chills, diaphoresis, fatigue, fever and unexpected weight change.   HENT: Positive for rhinorrhea. Negative for congestion, ear pain, hearing loss, postnasal drip, sinus pressure, sneezing, sore throat and trouble swallowing.    Eyes: Negative for pain, discharge and visual disturbance.   Respiratory: Negative for cough, chest tightness, shortness of breath and wheezing.    Cardiovascular: Negative for chest pain, palpitations and leg swelling.   Gastrointestinal: Negative for abdominal distention, abdominal pain, blood in stool, constipation, diarrhea, nausea and vomiting.   Endocrine: Positive for polyuria. Negative for polydipsia.   Genitourinary: Negative for difficulty urinating, dysuria, hematuria and menstrual problem.   Musculoskeletal: Negative for arthralgias, joint swelling and neck pain.   Skin: Negative for rash.   Neurological: Negative for weakness and headaches.   Psychiatric/Behavioral: Negative for confusion and dysphoric mood.       Objective:      Physical Exam   Constitutional: She is oriented to person, place, and time. She appears well-developed and well-nourished. No distress.   HENT:   Head: Normocephalic and atraumatic.   Right Ear: Hearing, tympanic membrane, external ear and ear canal normal.   Left Ear: Hearing, tympanic membrane, external ear and ear canal normal.   Nose: Nose normal.   Mouth/Throat: Oropharynx is clear and moist and mucous membranes are normal. No oral lesions. No oropharyngeal exudate, posterior oropharyngeal edema or posterior oropharyngeal erythema.   She has  "a white patch on her gum over her top incisor consistent with leukoplakia    Eyes: Conjunctivae, EOM and lids are normal. Pupils are equal, round, and reactive to light. No scleral icterus.   Neck: Normal range of motion. Neck supple. Carotid bruit is not present. No thyroid mass and no thyromegaly present.   Cardiovascular: Normal rate, regular rhythm and normal heart sounds.   No extrasystoles are present. PMI is not displaced.  Exam reveals no gallop.    No murmur heard.  Pulmonary/Chest: Effort normal and breath sounds normal. No accessory muscle usage. No respiratory distress.   Clear to auscultation bilaterally.   Abdominal: Soft. Normal appearance and bowel sounds are normal. She exhibits no abdominal bruit. There is no hepatosplenomegaly. There is no tenderness. There is no rebound.   Lymphadenopathy:        Head (right side): No submental and no submandibular adenopathy present.        Head (left side): No submental and no submandibular adenopathy present.        Right cervical: No superficial cervical, no deep cervical and no posterior cervical adenopathy present.       Left cervical: No superficial cervical, no deep cervical and no posterior cervical adenopathy present.        Right: No supraclavicular adenopathy present.        Left: No supraclavicular adenopathy present.   Neurological: She is alert and oriented to person, place, and time.   Skin: Skin is warm, dry and intact.   Psychiatric: She has a normal mood and affect.     Blood pressure 122/78, pulse 97, temperature 98.4 °F (36.9 °C), temperature source Oral, resp. rate 16, height 5' 4.02" (1.626 m), weight 88.6 kg (195 lb 7 oz), last menstrual period 08/29/2017, SpO2 98 %.Body mass index is 33.53 kg/m².        A/P:  1)  probable egg ALLERGY.  New.  She will avoid consuming eggs.  We will refer her to the allergist.  I did give her an EpiPen with instructions on how to use this.  If she has a recurrent reaction, she will use her EpiPen and go to " the ER  2)  GIULIA.  Persistent.  We will refer her again to the sleep center for further evaluation  3)  obesity with a BMI of 33.  Persistent.  She will start contrave and will improve her diet, exercise regularly and lose weight.  We will recheck labs in 3 months  4)  hyperlipidemia.  Status unknown.  We will recheck fasting labs in 3 months  5)  prediabetes.  Status unknown.  We will check an A1c in 3 months  6)  elevated LFTs.  Status unknown.  We will recheck LFTs in 3 months  7)  hypertension.  Well-controlled.  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  We will refer her to ENT    I will see her back in 3 months or sooner if needed

## 2017-09-18 ENCOUNTER — LAB VISIT (OUTPATIENT)
Dept: LAB | Facility: HOSPITAL | Age: 42
End: 2017-09-18
Attending: ALLERGY & IMMUNOLOGY
Payer: COMMERCIAL

## 2017-09-18 ENCOUNTER — OFFICE VISIT (OUTPATIENT)
Dept: OTOLARYNGOLOGY | Facility: CLINIC | Age: 42
End: 2017-09-18
Payer: COMMERCIAL

## 2017-09-18 ENCOUNTER — OFFICE VISIT (OUTPATIENT)
Dept: ALLERGY | Facility: CLINIC | Age: 42
End: 2017-09-18
Payer: COMMERCIAL

## 2017-09-18 VITALS
SYSTOLIC BLOOD PRESSURE: 125 MMHG | BODY MASS INDEX: 33.26 KG/M2 | HEART RATE: 87 BPM | WEIGHT: 193.81 LBS | DIASTOLIC BLOOD PRESSURE: 85 MMHG

## 2017-09-18 VITALS — WEIGHT: 193.81 LBS | HEART RATE: 104 BPM | BODY MASS INDEX: 33.09 KG/M2 | HEIGHT: 64 IN

## 2017-09-18 DIAGNOSIS — L50.8 ACUTE URTICARIA: Primary | ICD-10-CM

## 2017-09-18 DIAGNOSIS — K13.21 ORAL LEUKOPLAKIA: Primary | ICD-10-CM

## 2017-09-18 DIAGNOSIS — L50.8 ACUTE URTICARIA: ICD-10-CM

## 2017-09-18 DIAGNOSIS — J31.0 OTHER CHRONIC RHINITIS: ICD-10-CM

## 2017-09-18 PROCEDURE — 99999 PR PBB SHADOW E&M-EST. PATIENT-LVL II: CPT | Mod: PBBFAC,,, | Performed by: ALLERGY & IMMUNOLOGY

## 2017-09-18 PROCEDURE — 88305 TISSUE EXAM BY PATHOLOGIST: CPT | Performed by: PATHOLOGY

## 2017-09-18 PROCEDURE — 86003 ALLG SPEC IGE CRUDE XTRC EA: CPT

## 2017-09-18 PROCEDURE — 36415 COLL VENOUS BLD VENIPUNCTURE: CPT | Mod: PO

## 2017-09-18 PROCEDURE — 40810 EXCISION OF MOUTH LESION: CPT | Mod: S$GLB,,, | Performed by: NURSE PRACTITIONER

## 2017-09-18 PROCEDURE — 88305 TISSUE EXAM BY PATHOLOGIST: CPT | Mod: 26,,, | Performed by: PATHOLOGY

## 2017-09-18 PROCEDURE — 99243 OFF/OP CNSLTJ NEW/EST LOW 30: CPT | Mod: 25,S$GLB,, | Performed by: NURSE PRACTITIONER

## 2017-09-18 PROCEDURE — 86003 ALLG SPEC IGE CRUDE XTRC EA: CPT | Mod: 59

## 2017-09-18 PROCEDURE — 99999 PR PBB SHADOW E&M-EST. PATIENT-LVL III: CPT | Mod: PBBFAC,,, | Performed by: NURSE PRACTITIONER

## 2017-09-18 PROCEDURE — 95004 PERQ TESTS W/ALRGNC XTRCS: CPT | Mod: S$GLB,,, | Performed by: ALLERGY & IMMUNOLOGY

## 2017-09-18 PROCEDURE — 99244 OFF/OP CNSLTJ NEW/EST MOD 40: CPT | Mod: 25,S$GLB,, | Performed by: ALLERGY & IMMUNOLOGY

## 2017-09-18 NOTE — PROGRESS NOTES
Subjective:       Patient ID: Ana Dorantes is a 42 y.o. female.    Chief Complaint: leukoplakia of gingiva    HPI   Patient is referred by Dr. Barrios for consultation for leukoplakia of gingiva. Patient has a spot on her gums above her right maxillary canine that has been present X 1.5-2 years. It has not spread significantly in size. It does not bother her. It is not painful. It seems to nearly go away at times but then comes back again. Her dentist prescribed a mouth wash and recommended she see her PCP who referred her here for possible biopsy. She has never smoked. She drink EtOH on occasion.     Review of Systems   Constitutional: Negative.    HENT: Negative.    Eyes: Negative.    Respiratory: Negative.    Cardiovascular: Negative.    Gastrointestinal: Negative.    Musculoskeletal: Negative.    Skin: Negative.    Neurological: Negative.    Hematological: Negative.    Psychiatric/Behavioral: Negative.        Objective:      Physical Exam   Constitutional: She is oriented to person, place, and time. Vital signs are normal. She appears well-developed and well-nourished. She is cooperative. She does not appear ill. No distress.   HENT:   Head: Normocephalic and atraumatic.   Right Ear: Hearing, tympanic membrane, external ear and ear canal normal. Tympanic membrane is not erythematous. No middle ear effusion.   Left Ear: Hearing, tympanic membrane, external ear and ear canal normal. Tympanic membrane is not erythematous.  No middle ear effusion.   Nose: Nose normal. No mucosal edema or rhinorrhea. Right sinus exhibits no maxillary sinus tenderness and no frontal sinus tenderness. Left sinus exhibits no maxillary sinus tenderness and no frontal sinus tenderness.   Mouth/Throat: Uvula is midline, oropharynx is clear and moist and mucous membranes are normal. Mucous membranes are not pale, not dry and not cyanotic. No oral lesions. No oropharyngeal exudate, posterior oropharyngeal edema or posterior oropharyngeal  erythema.   Two areas of leukoplakia noted:  One adjacent to root of right maxillary canine.  One in left retromolar trigone laterally.    Eyes: Conjunctivae, EOM and lids are normal. Pupils are equal, round, and reactive to light. Right eye exhibits no discharge. Left eye exhibits no discharge. No scleral icterus.   Neck: Trachea normal and normal range of motion. Neck supple. No tracheal deviation present. No thyroid mass and no thyromegaly present.   Cardiovascular: Normal rate.    Pulmonary/Chest: Effort normal. No stridor. No respiratory distress. She has no wheezes.   Musculoskeletal: Normal range of motion.   Lymphadenopathy:        Head (right side): No submental, no submandibular, no tonsillar, no preauricular and no posterior auricular adenopathy present.        Head (left side): No submental, no submandibular, no tonsillar, no preauricular and no posterior auricular adenopathy present.     She has no cervical adenopathy.        Right cervical: No superficial cervical and no posterior cervical adenopathy present.       Left cervical: No superficial cervical and no posterior cervical adenopathy present.   Neurological: She is alert and oriented to person, place, and time. She has normal strength. Coordination and gait normal.   Skin: Skin is warm, dry and intact. No lesion and no rash noted. She is not diaphoretic. No cyanosis. No pallor.   Psychiatric: She has a normal mood and affect. Her speech is normal and behavior is normal. Judgment and thought content normal. Cognition and memory are normal.   Nursing note and vitals reviewed.      SEPARATE PROCEDURE NOTE:    After written consent obtain, hurricane topical anesthetic applied w/gauze to site for several minutes. Area injected with 1% xylocaine w/epi. #11 Scalpel and pickups used to excise lesion from right gingival mucosa. AgNO3 applied to sites for hemostasis. Pt tolerated procedure well.    Assessment:       1. Oral leukoplakia        Plan:      Biopsy sent for pathology. Will call patient with results as soon as available    Orajel for pain  Call back if symptoms worsen/persist

## 2017-09-18 NOTE — LETTER
September 18, 2017      Gina Barrios MD  [address] [address] [address]           [facility]  [address] [facility] [address] [place] [address]  Phone: [phone]  Fax: [phone]          Patient: Ana Dorantes   MR Number: [id number]   YOB: 1975   Date of Visit: 9/18/2017       Dear Dr. Gina Barrios:    Thank you for referring Ana Dorantes to me for evaluation. Attached you will find relevant portions of my assessment and plan of care.    If you have questions, please do not hesitate to call me. I look forward to following Ana Dorantes along with you.    Sincerely,    Brittney Wisdom, NP    Enclosure  CC:  No Recipients    If you would like to receive this communication electronically, please contact [email or web address] or [phone] to request more information on [facility] Link access.    For providers and/or their staff who would like to refer a patient to [facility], please contact us through our one-stop-shop provider referral line, [facility] , at [phone].    If you feel you have received this communication in error or would no longer like to receive these types of communications, please e-mail [email or web address]

## 2017-09-18 NOTE — PROGRESS NOTES
Subjective:       Patient ID: Ana Dorantes is a 42 y.o. female.    Chief Complaint:  Allergies (itching one night, then hives after eating eggs.)      41 yo woman presents for consult from Dr Gina Barrios for possible egg allergy. She states one day she ate egg sandwich with bills on it. Within minutes, about 4 minutes she had itching and then hives all over - trunk, arms and legs. She also had tingling of upper lip. She called friend who is nurse and told her to take benadryl and zantac. She took 50 mg benadryl and one zantac. This calmed it. Then 2 hours later took more benadryl and in about 5 hours resolved. Never felt throat tight, no tongue swelling. No SOB. A week later she decided to test it and took one bite of egg and felt itch on arms so stopped. Then recently was in [place]. She ordered calzone, told no egg but after about half of it she had hive by her watch so stopped and took 1 benadryl. She is avoiding egg and anything with egg baked in right now. She has no other food allergy. No insect or latex allergy. She has no asthma or eczema but does have rhinitis. Has congestion, sinus pressure at times, ears cristel fluid feeling. She has Flonase and does help but only uses occasionally. She has some HTN but no other medical issues. No h/o ENT surgery. No antihistamine medicine.         Environmental History: see history section for home environment  Review of Systems   Constitutional: Negative for appetite change, chills, fatigue and fever.   HENT: Positive for congestion, ear pain, postnasal drip, rhinorrhea and sinus pressure. Negative for ear discharge, facial swelling, nosebleeds, sneezing, sore throat, trouble swallowing and voice change.    Eyes: Negative for discharge, redness, itching and visual disturbance.   Respiratory: Negative for cough, choking, chest tightness, shortness of breath and wheezing.    Cardiovascular: Negative for chest pain, palpitations and leg swelling.   Gastrointestinal:  Negative for abdominal distention, abdominal pain, constipation, diarrhea, nausea and vomiting.   Genitourinary: Negative for difficulty urinating.   Musculoskeletal: Negative for arthralgias, gait problem, joint swelling and myalgias.   Skin: Positive for rash. Negative for color change.   Neurological: Negative for dizziness, syncope, weakness, light-headedness and headaches.   Hematological: Negative for adenopathy. Does not bruise/bleed easily.   Psychiatric/Behavioral: Negative for agitation, behavioral problems, confusion and sleep disturbance. The patient is not nervous/anxious.         Objective:    Physical Exam   Constitutional: She is oriented to person, place, and time. She appears well-developed and well-nourished. No distress.   HENT:   Head: Normocephalic and atraumatic.   Right Ear: Hearing, tympanic membrane, external ear and ear canal normal.   Left Ear: Hearing, tympanic membrane, external ear and ear canal normal.   Nose: No mucosal edema, rhinorrhea, sinus tenderness or septal deviation. No epistaxis. Right sinus exhibits no maxillary sinus tenderness and no frontal sinus tenderness. Left sinus exhibits no maxillary sinus tenderness and no frontal sinus tenderness.   Mouth/Throat: Uvula is midline, oropharynx is clear and moist and mucous membranes are normal. No uvula swelling.   Eyes: Conjunctivae are normal. Right eye exhibits no discharge. Left eye exhibits no discharge.   Neck: Normal range of motion. No thyromegaly present.   Cardiovascular: Normal rate, regular rhythm and normal heart sounds.    No murmur heard.  Pulmonary/Chest: Effort normal and breath sounds normal. No respiratory distress. She has no wheezes.   Abdominal: Soft. She exhibits no distension. There is no tenderness.   Musculoskeletal: Normal range of motion. She exhibits no edema or tenderness.   Lymphadenopathy:     She has no cervical adenopathy.   Neurological: She is alert and oriented to person, place, and time.    Skin: Skin is warm and dry. No rash noted. No erythema.   Psychiatric: She has a normal mood and affect. Her behavior is normal. Judgment and thought content normal.   Nursing note and vitals reviewed.      Laboratory:   Percutaneous Skin Testing: prick skin test to egg and controls, #3, 9/18/17:  neg egg and saline control, 3+ histamine control  Assessment:       1. Acute urticaria    2. Other chronic rhinitis         Plan:       1. Immunocaps for egg component and inhalants  2. Phone review  3. continue egg avoidance until lab results  4. Dr Barrios notified of completed consult via [employer]

## 2017-09-18 NOTE — LETTER
September 18, 2017      Gina Barrios MD  [address] [address] [address]           [facility]  [address] [facility] [address] [place] [address]  Phone: [phone]          Patient: Ana Dorantes   MR Number: [id number]   YOB: 1975   Date of Visit: 9/18/2017       Dear Dr. Gina Barrios:    Thank you for referring Ana Dorantes to me for evaluation. Attached you will find relevant portions of my assessment and plan of care.    If you have questions, please do not hesitate to call me. I look forward to following Ana Dorantes along with you.    Sincerely,    Cami Aranda MD    Enclosure  CC:  No Recipients    If you would like to receive this communication electronically, please contact [email or web address] or [phone] to request more information on [employer] Link access.    For providers and/or their staff who would like to refer a patient to [facility], please contact us through our one-stop-shop provider referral line, [facility], at [phone].    If you feel you have received this communication in error or would no longer like to receive these types of communications, please e-mail [email or web address]

## 2017-09-20 LAB
A ALTERNATA IGE QN: <0.35 KU/L
A FUMIGATUS IGE QN: <0.35 KU/L
ALLERGEN MAPLE/SYCAMORE IGE: <0.35 KU/L
ALLERGEN PENICILLIUM IGE: <0.35 KU/L
ALLERGEN WALNUT TREE IGE: <0.35 KU/L
ALLERGEN WHEAT IGE: <0.35 KU/L
ALLERGEN WHITE PINE TREE IGE: <0.35 KU/L
ALLERGEN WILLOW IGE: <0.35 KU/L
ALMOND IGE QN: <0.35 KU/L
BAHIA GRASS IGE QN: <0.35 KU/L
BALD CYPRESS IGE QN: <0.35 KU/L
BERMUDA GRASS IGE QN: <0.35 KU/L
C HERBARUM IGE QN: <0.35 KU/L
CAT DANDER IGE QN: <0.35 KU/L
COMMON RAGWEED IGE QN: <0.35 KU/L
COTTONWOOD IGE QN: <0.35 KU/L
COW MILK IGE QN: <0.35 KU/L
D FARINAE IGE QN: <0.35 KU/L
D PTERONYSS IGE QN: <0.35 KU/L
DEPRECATED A ALTERNATA IGE RAST QL: NORMAL
DEPRECATED A FUMIGATUS IGE RAST QL: NORMAL
DEPRECATED ALMOND IGE RAST QL: NORMAL
DEPRECATED BAHIA GRASS IGE RAST QL: NORMAL
DEPRECATED BALD CYPRESS IGE RAST QL: NORMAL
DEPRECATED BERMUDA GRASS IGE RAST QL: NORMAL
DEPRECATED C HERBARUM IGE RAST QL: NORMAL
DEPRECATED CAT DANDER IGE RAST QL: NORMAL
DEPRECATED COMMON RAGWEED IGE RAST QL: NORMAL
DEPRECATED COTTONWOOD IGE RAST QL: NORMAL
DEPRECATED COW MILK IGE RAST QL: NORMAL
DEPRECATED D FARINAE IGE RAST QL: NORMAL
DEPRECATED D PTERONYSS IGE RAST QL: NORMAL
DEPRECATED DOG DANDER IGE RAST QL: NORMAL
DEPRECATED EGG WHITE IGE RAST QL: NORMAL
DEPRECATED EGG YOLK IGE RAST QL: NORMAL
DEPRECATED ENGL PLANTAIN IGE RAST QL: NORMAL
DEPRECATED JOHNSON GRASS IGE RAST QL: NORMAL
DEPRECATED MARSH ELDER IGE RAST QL: NORMAL
DEPRECATED MISC ALLERGEN IGE RAST QL: NORMAL
DEPRECATED MUGWORT IGE RAST QL: NORMAL
DEPRECATED OAT IGE RAST QL: NORMAL
DEPRECATED PEANUT IGE RAST QL: NORMAL
DEPRECATED PECAN/HICK NUT IGE RAST QL: NORMAL
DEPRECATED PECAN/HICK TREE IGE RAST QL: NORMAL
DEPRECATED ROACH IGE RAST QL: NORMAL
DEPRECATED S ROSTRATA IGE RAST QL: NORMAL
DEPRECATED SALTWORT IGE RAST QL: NORMAL
DEPRECATED SHRIMP IGE RAST QL: NORMAL
DEPRECATED SILVER BIRCH IGE RAST QL: NORMAL
DEPRECATED SOYBEAN IGE RAST QL: NORMAL
DEPRECATED TIMOTHY IGE RAST QL: NORMAL
DEPRECATED WHITE OAK IGE RAST QL: NORMAL
DOG DANDER IGE QN: <0.35 KU/L
EGG WHITE IGE QN: <0.35 KU/L
EGG YOLK IGE/IGE TOTAL %: <0.35 KU/L
ENGL PLANTAIN IGE QN: <0.35 KU/L
JOHNSON GRASS IGE QN: <0.35 KU/L
MAPLE/SYCAMORE CLASS: NORMAL
MARSH ELDER IGE QN: <0.35 KU/L
MUGWORT IGE QN: <0.35 KU/L
OAT IGE QN: <0.35 KU/L
PEANUT IGE QN: <0.35 KU/L
PECAN/HICK NUT IGE QN: <0.35 KU/L
PECAN/HICK TREE IGE QN: <0.35 KU/L
PENICILLIUM CLASS: NORMAL
ROACH IGE QN: <0.35 KU/L
S ROSTRATA IGE QN: <0.35 KU/L
SALTWORT IGE QN: <0.35 KU/L
SHRIMP IGE QN: <0.35 KU/L
SILVER BIRCH IGE QN: <0.35 KU/L
SOYBEAN IGE QN: <0.35 KU/L
TIMOTHY IGE QN: <0.35 KU/L
WALNUT TREE CLASS: NORMAL
WHEAT CLASS: NORMAL
WHITE OAK IGE QN: <0.35 KU/L
WHITE PINE CLASS: NORMAL
WILLOW CLASS: NORMAL

## 2017-09-21 LAB
EGG WHITE IGE QN: 0.12 KU/L
OVALB IGE QN: <0.1 KU/L
OVOMUCOID IGE QN: <0.1 KU/L
WHOLE EGG IGE QN: <0.1 KU/L

## 2017-09-25 ENCOUNTER — TELEPHONE (OUTPATIENT)
Dept: ALLERGY | Facility: CLINIC | Age: 42
End: 2017-09-25

## 2017-09-25 NOTE — TELEPHONE ENCOUNTER
Please let her know all allergy tests are negative including egg and egg component test. She can start trying egg, would advised baked in egg first and then small amount of egg itself

## 2017-09-27 ENCOUNTER — PATIENT MESSAGE (OUTPATIENT)
Dept: ALLERGY | Facility: CLINIC | Age: 42
End: 2017-09-27

## 2017-11-07 ENCOUNTER — PATIENT MESSAGE (OUTPATIENT)
Dept: FAMILY MEDICINE | Facility: CLINIC | Age: 42
End: 2017-11-07

## 2017-11-10 NOTE — TELEPHONE ENCOUNTER
----- Message from Kathleen Santiago sent at 11/10/2017  2:33 PM CST -----  Patient needs a refill of medication: BELVIQ XR) 20 mg Tb24 called into [facility] pharmacy on [address] in [place]. Please order and call patient back at [phone] to advise.Thanks!

## 2017-11-13 ENCOUNTER — PATIENT MESSAGE (OUTPATIENT)
Dept: FAMILY MEDICINE | Facility: CLINIC | Age: 42
End: 2017-11-13

## 2017-11-13 NOTE — TELEPHONE ENCOUNTER
Spoke with pharmacist at [facility]. Stated rx not received. Did you want rx called in? Please advise.

## 2017-11-15 ENCOUNTER — TELEPHONE (OUTPATIENT)
Dept: FAMILY MEDICINE | Facility: CLINIC | Age: 42
End: 2017-11-15

## 2017-11-16 NOTE — TELEPHONE ENCOUNTER
Pt is aware insurance will not cover for it. She stated she will look into paying out of pocket for it and see if it works in the first month

## 2018-01-16 ENCOUNTER — OFFICE VISIT (OUTPATIENT)
Dept: FAMILY MEDICINE | Facility: CLINIC | Age: 43
End: 2018-01-16
Payer: COMMERCIAL

## 2018-01-16 VITALS
SYSTOLIC BLOOD PRESSURE: 115 MMHG | WEIGHT: 192.69 LBS | TEMPERATURE: 99 F | HEART RATE: 100 BPM | BODY MASS INDEX: 32.9 KG/M2 | DIASTOLIC BLOOD PRESSURE: 78 MMHG | OXYGEN SATURATION: 96 % | HEIGHT: 64 IN

## 2018-01-16 DIAGNOSIS — J32.9 SINUSITIS, UNSPECIFIED CHRONICITY, UNSPECIFIED LOCATION: Primary | ICD-10-CM

## 2018-01-16 DIAGNOSIS — R09.89 RUNNY NOSE: ICD-10-CM

## 2018-01-16 DIAGNOSIS — R05.9 COUGH: ICD-10-CM

## 2018-01-16 PROCEDURE — 99213 OFFICE O/P EST LOW 20 MIN: CPT | Mod: S$GLB,,, | Performed by: NURSE PRACTITIONER

## 2018-01-16 PROCEDURE — 99999 PR PBB SHADOW E&M-EST. PATIENT-LVL IV: CPT | Mod: PBBFAC,,, | Performed by: NURSE PRACTITIONER

## 2018-01-16 RX ORDER — CEFDINIR 300 MG/1
300 CAPSULE ORAL 2 TIMES DAILY
Qty: 20 CAPSULE | Refills: 0 | Status: SHIPPED | OUTPATIENT
Start: 2018-01-16 | End: 2018-01-26

## 2018-01-16 RX ORDER — BENZONATATE 200 MG/1
200 CAPSULE ORAL 3 TIMES DAILY PRN
Qty: 30 CAPSULE | Refills: 0 | Status: SHIPPED | OUTPATIENT
Start: 2018-01-16 | End: 2018-01-26

## 2018-01-16 RX ORDER — AZELASTINE 1 MG/ML
1 SPRAY, METERED NASAL 2 TIMES DAILY
Qty: 30 ML | Refills: 0 | Status: SHIPPED | OUTPATIENT
Start: 2018-01-16 | End: 2020-06-12

## 2018-01-16 NOTE — PATIENT INSTRUCTIONS
Sinusitis (Antibiotic Treatment)    The sinuses are air-filled spaces within the bones of the face. They connect to the inside of the nose. Sinusitis is an inflammation of the tissue lining the sinus cavity. Sinus inflammation can occur during a cold. It can also be due to allergies to pollens and other particles in the air. Sinusitis can cause symptoms of sinus congestion and fullness. A sinus infection causes fever, headache and facial pain. There is often green or yellow drainage from the nose or into the back of the throat (post-nasal drip). You have been given antibiotics to treat this condition.  Home care:  · Take the full course of antibiotics as instructed. Do not stop taking them, even if you feel better.  · Drink plenty of water, hot tea, and other liquids. This may help thin mucus. It also may promote sinus drainage.  · Heat may help soothe painful areas of the face. Use a towel soaked in hot water. Or,  the shower and direct the hot spray onto your face. Using a vaporizer along with a menthol rub at night may also help.   · An expectorant containing guaifenesin may help thin the mucus and promote drainage from the sinuses.  · Over-the-counter decongestants may be used unless a similar medicine was prescribed. Nasal sprays work the fastest. Use one that contains phenylephrine or oxymetazoline. First blow the nose gently. Then use the spray. Do not use these medicines more often than directed on the label or symptoms may get worse. You may also use tablets containing pseudoephedrine. Avoid products that combine ingredients, because side effects may be increased. Read labels. You can also ask the pharmacist for help. (NOTE: Persons with high blood pressure should not use decongestants. They can raise blood pressure.)  · Over-the-counter antihistamines may help if allergies contributed to your sinusitis.    · Do not use nasal rinses or irrigation during an acute sinus infection, unless told to by  your health care provider. Rinsing may spread the infection to other sinuses.  · Use acetaminophen or ibuprofen to control pain, unless another pain medicine was prescribed. (If you have chronic liver or kidney disease or ever had a stomach ulcer, talk with your doctor before using these medicines. Aspirin should never be used in anyone under 18 years of age who is ill with a fever. It may cause severe liver damage.)  · Don't smoke. This can worsen symptoms.  Follow-up care  Follow up with your healthcare provider or our staff if you are not improving within the next week.  When to seek medical advice  Call your healthcare provider if any of these occur:  · Facial pain or headache becoming more severe  · Stiff neck  · Unusual drowsiness or confusion  · Swelling of the forehead or eyelids  · Vision problems, including blurred or double vision  · Fever of 100.4ºF (38ºC) or higher, or as directed by your healthcare provider  · Seizure  · Breathing problems  · Symptoms not resolving within 10 days  Date Last Reviewed: 4/13/2015  © 2527-4699 [employer]. [address] [place] [address], [place] [address]. All rights reserved. This information is not intended as a substitute for professional medical care. Always follow your healthcare professional's instructions.

## 2018-01-16 NOTE — PROGRESS NOTES
Subjective:       Patient ID: Ana Dorantes is a 42 y.o. female.    Chief Complaint: Cough (Started w/cold 12/16 ); Fever; Generalized Body Aches; Nasal Congestion; and Fatigue    Started 12/16/2017 with URI, began to get better and now worse. Sunday and yesterday with fever.   Using Mucinex, Tea, Delsym, and Leanna Zohra Cold Plus.       Cough   This is a new problem. The current episode started 1 to 4 weeks ago. The problem has been unchanged. The cough is productive of purulent sputum. Associated symptoms include chills, a fever, myalgias, nasal congestion, postnasal drip, rhinorrhea and a sore throat. Pertinent negatives include no chest pain or shortness of breath. She has tried OTC cough suppressant for the symptoms.   Fever    This is a new problem. The current episode started in the past 7 days. Her temperature was unmeasured prior to arrival. Associated symptoms include congestion, coughing, muscle aches and a sore throat. Pertinent negatives include no chest pain. She has tried NSAIDs for the symptoms.   Fatigue   This is a new problem. The current episode started in the past 7 days. Associated symptoms include chills, congestion, coughing, fatigue, a fever, myalgias and a sore throat. Pertinent negatives include no chest pain.     Vitals:    01/16/18 0906   BP: 115/78   Pulse: 100   Temp: 98.7 °F (37.1 °C)     Review of Systems   Constitutional: Positive for chills, fatigue and fever.   HENT: Positive for congestion, postnasal drip, rhinorrhea and sore throat.    Eyes: Negative for visual disturbance.   Respiratory: Positive for cough. Negative for chest tightness and shortness of breath.    Cardiovascular: Negative for chest pain.   Musculoskeletal: Positive for myalgias.       Objective:      Physical Exam   Constitutional: She is oriented to person, place, and time. Vital signs are normal. She appears well-developed and well-nourished. She is cooperative.   HENT:   Head: Normocephalic and atraumatic.    Right Ear: Hearing, external ear and ear canal normal. A middle ear effusion is present.   Left Ear: Hearing, external ear and ear canal normal. A middle ear effusion is present.   Nose: Mucosal edema and rhinorrhea present. Right sinus exhibits maxillary sinus tenderness and frontal sinus tenderness. Left sinus exhibits maxillary sinus tenderness and frontal sinus tenderness.   Mouth/Throat: Uvula is midline and mucous membranes are normal. Posterior oropharyngeal erythema present.   Eyes: Conjunctivae, EOM and lids are normal.   Neck: Normal range of motion. Neck supple.   Cardiovascular: Normal rate, regular rhythm and normal heart sounds.    Pulmonary/Chest: Effort normal and breath sounds normal. No respiratory distress.   Coughing during exam.   Musculoskeletal: Normal range of motion.   Lymphadenopathy:        Head (right side): No submental, no submandibular, no tonsillar, no preauricular and no posterior auricular adenopathy present.        Head (left side): No submental, no submandibular, no tonsillar, no preauricular and no posterior auricular adenopathy present.     She has no cervical adenopathy.   Neurological: She is alert and oriented to person, place, and time.   Skin: Skin is warm, dry and intact.   Psychiatric: She has a normal mood and affect. Her speech is normal and behavior is normal. Judgment and thought content normal.   Nursing note and vitals reviewed.      Assessment & Plan:       Sinusitis, unspecified chronicity, unspecified location  -     cefdinir (OMNICEF) 300 MG capsule; Take 1 capsule (300 mg total) by mouth 2 (two) times daily.  Dispense: 20 capsule; Refill: 0    Cough  -     benzonatate (TESSALON) 200 MG capsule; Take 1 capsule (200 mg total) by mouth 3 (three) times daily as needed.  Dispense: 30 capsule; Refill: 0    Runny nose  -     azelastine (ASTELIN) 137 mcg (0.1 %) nasal spray; 1 spray (137 mcg total) by Nasal route 2 (two) times daily.  Dispense: 30 mL; Refill: 0           Follow-up if symptoms worsen or fail to improve.

## 2018-01-19 RX ORDER — TRIAMTERENE/HYDROCHLOROTHIAZID 37.5-25 MG
TABLET ORAL
Qty: 90 TABLET | Refills: 0 | Status: SHIPPED | OUTPATIENT
Start: 2018-01-19 | End: 2018-02-27 | Stop reason: SDUPTHER

## 2018-02-01 ENCOUNTER — TELEPHONE (OUTPATIENT)
Dept: FAMILY MEDICINE | Facility: CLINIC | Age: 43
End: 2018-02-01

## 2018-02-01 NOTE — TELEPHONE ENCOUNTER
Left message on recorder for patient to please call to reschedule her appointment on 2/8/18 with Dr. Barrios.

## 2018-02-27 RX ORDER — TRIAMTERENE/HYDROCHLOROTHIAZID 37.5-25 MG
TABLET ORAL
Qty: 90 TABLET | Refills: 0 | Status: SHIPPED | OUTPATIENT
Start: 2018-02-27 | End: 2018-08-10 | Stop reason: SDUPTHER

## 2018-03-14 ENCOUNTER — LAB VISIT (OUTPATIENT)
Dept: LAB | Facility: HOSPITAL | Age: 43
End: 2018-03-14
Attending: FAMILY MEDICINE
Payer: COMMERCIAL

## 2018-03-14 DIAGNOSIS — Z00.00 ANNUAL PHYSICAL EXAM: ICD-10-CM

## 2018-03-14 DIAGNOSIS — I10 HYPERTENSION, ESSENTIAL: ICD-10-CM

## 2018-03-14 LAB
ALBUMIN SERPL BCP-MCNC: 3.5 G/DL
ALBUMIN SERPL BCP-MCNC: 3.5 G/DL
ALP SERPL-CCNC: 62 U/L
ALP SERPL-CCNC: 62 U/L
ALT SERPL W/O P-5'-P-CCNC: 37 U/L
ALT SERPL W/O P-5'-P-CCNC: 37 U/L
ANION GAP SERPL CALC-SCNC: 9 MMOL/L
AST SERPL-CCNC: 30 U/L
AST SERPL-CCNC: 30 U/L
BILIRUB DIRECT SERPL-MCNC: 0.3 MG/DL
BILIRUB SERPL-MCNC: 0.7 MG/DL
BILIRUB SERPL-MCNC: 0.7 MG/DL
BUN SERPL-MCNC: 12 MG/DL
CALCIUM SERPL-MCNC: 9.4 MG/DL
CHLORIDE SERPL-SCNC: 102 MMOL/L
CHOLEST SERPL-MCNC: 144 MG/DL
CHOLEST/HDLC SERPL: 3.4 {RATIO}
CO2 SERPL-SCNC: 25 MMOL/L
CREAT SERPL-MCNC: 0.7 MG/DL
EST. GFR  (AFRICAN AMERICAN): >60 ML/MIN/1.73 M^2
EST. GFR  (NON AFRICAN AMERICAN): >60 ML/MIN/1.73 M^2
ESTIMATED AVG GLUCOSE: 117 MG/DL
GLUCOSE SERPL-MCNC: 103 MG/DL
HBA1C MFR BLD HPLC: 5.7 %
HDLC SERPL-MCNC: 42 MG/DL
HDLC SERPL: 29.2 %
LDLC SERPL CALC-MCNC: 85.4 MG/DL
NONHDLC SERPL-MCNC: 102 MG/DL
POTASSIUM SERPL-SCNC: 3.7 MMOL/L
PROT SERPL-MCNC: 7.8 G/DL
PROT SERPL-MCNC: 7.8 G/DL
SODIUM SERPL-SCNC: 136 MMOL/L
TRIGL SERPL-MCNC: 83 MG/DL
TSH SERPL DL<=0.005 MIU/L-ACNC: 2.44 UIU/ML

## 2018-03-14 PROCEDURE — 80053 COMPREHEN METABOLIC PANEL: CPT

## 2018-03-14 PROCEDURE — 83036 HEMOGLOBIN GLYCOSYLATED A1C: CPT

## 2018-03-14 PROCEDURE — 36415 COLL VENOUS BLD VENIPUNCTURE: CPT | Mod: PO

## 2018-03-14 PROCEDURE — 84443 ASSAY THYROID STIM HORMONE: CPT

## 2018-03-14 PROCEDURE — 80061 LIPID PANEL: CPT

## 2018-03-15 ENCOUNTER — PATIENT MESSAGE (OUTPATIENT)
Dept: FAMILY MEDICINE | Facility: CLINIC | Age: 43
End: 2018-03-15

## 2018-03-16 ENCOUNTER — OFFICE VISIT (OUTPATIENT)
Dept: FAMILY MEDICINE | Facility: CLINIC | Age: 43
End: 2018-03-16
Payer: COMMERCIAL

## 2018-03-16 VITALS
TEMPERATURE: 99 F | SYSTOLIC BLOOD PRESSURE: 124 MMHG | HEART RATE: 80 BPM | DIASTOLIC BLOOD PRESSURE: 70 MMHG | RESPIRATION RATE: 18 BRPM | WEIGHT: 184.5 LBS | HEIGHT: 64 IN | BODY MASS INDEX: 31.5 KG/M2

## 2018-03-16 DIAGNOSIS — K76.0 FATTY LIVER: ICD-10-CM

## 2018-03-16 DIAGNOSIS — R73.03 PREDIABETES: ICD-10-CM

## 2018-03-16 DIAGNOSIS — I10 HYPERTENSION, ESSENTIAL: Primary | ICD-10-CM

## 2018-03-16 PROCEDURE — 3078F DIAST BP <80 MM HG: CPT | Mod: CPTII,S$GLB,, | Performed by: FAMILY MEDICINE

## 2018-03-16 PROCEDURE — 90471 IMMUNIZATION ADMIN: CPT | Mod: S$GLB,,, | Performed by: FAMILY MEDICINE

## 2018-03-16 PROCEDURE — 90686 IIV4 VACC NO PRSV 0.5 ML IM: CPT | Mod: S$GLB,,, | Performed by: FAMILY MEDICINE

## 2018-03-16 PROCEDURE — 3074F SYST BP LT 130 MM HG: CPT | Mod: CPTII,S$GLB,, | Performed by: FAMILY MEDICINE

## 2018-03-16 PROCEDURE — 99214 OFFICE O/P EST MOD 30 MIN: CPT | Mod: 25,S$GLB,, | Performed by: FAMILY MEDICINE

## 2018-03-16 RX ORDER — TERBINAFINE HYDROCHLORIDE 250 MG/1
250 TABLET ORAL DAILY
Qty: 30 TABLET | Refills: 1 | Status: SHIPPED | OUTPATIENT
Start: 2018-03-16 | End: 2018-04-30 | Stop reason: SDUPTHER

## 2018-03-19 NOTE — PROGRESS NOTES
Subjective:       Patient ID: Ana Dorantes is a 42 y.o. female.    Chief Complaint: Hypertension (Follow up with labs prior: Wants flu shot)    HPI   The patient is a 42-year-old who is here today to follow-up on her blood pressure and on her recent labs.  Today we discussed the followin)  hypertension.  She is doing well with her Maxide.  She does not check her blood pressure outside of the office.  She has lost 8 pounds past 2 months and is pleased with the progress she has made  2)  prediabetes.  We did discuss her recent A1c of 5.7 which is down from 6.3.  Since her last visit, she has really been changing her diet and losing weight  3)  obesity with a BMI of 31.  Since her last visit, she is really been working to lose weight.  She is not eating red meats.  She is eating more fruits and vegetables.  She is cutting out her sugar as much as possible.  She is decreasing her carb intake.  She is drinking a lot of water.  In January, she did start Belviq which has helped decreased her appetite.  She has been exercising by walking and going to the gym.  We did review her recent labs which showed improvement in her A1c as noted above and decreased cholesterol levels by approximately 20-30 points compared to prior values.  She is pleased with the progress she has made  4)  toenail fungus.  She would like to try Lamisil again for her toenail fungus.  She did review improvement previously when she took this but did not have complete resolution       Review of Systems   Constitutional: Negative for activity change, appetite change, chills, diaphoresis, fatigue, fever and unexpected weight change.   HENT: Positive for rhinorrhea. Negative for congestion, ear pain, hearing loss, postnasal drip, sinus pressure, sneezing, sore throat and trouble swallowing.    Eyes: Negative for pain, discharge and visual disturbance.   Respiratory: Negative for cough, chest tightness, shortness of breath and wheezing.     Cardiovascular: Negative for chest pain, palpitations and leg swelling.   Gastrointestinal: Negative for abdominal distention, abdominal pain, blood in stool, constipation, diarrhea, nausea and vomiting.   Endocrine: Negative for polydipsia and polyuria.   Genitourinary: Negative for difficulty urinating, dysuria, hematuria and menstrual problem.   Musculoskeletal: Negative for arthralgias, joint swelling and neck pain.   Skin: Negative for rash.   Neurological: Negative for weakness and headaches.   Psychiatric/Behavioral: Negative for confusion and dysphoric mood.       Objective:      Physical Exam   Constitutional: She is oriented to person, place, and time. She appears well-developed and well-nourished. No distress.   HENT:   Head: Normocephalic and atraumatic.   Right Ear: Hearing, tympanic membrane, external ear and ear canal normal.   Left Ear: Hearing, tympanic membrane, external ear and ear canal normal.   Nose: Nose normal.   Mouth/Throat: Oropharynx is clear and moist and mucous membranes are normal. No oral lesions. No oropharyngeal exudate, posterior oropharyngeal edema or posterior oropharyngeal erythema.   Eyes: Conjunctivae, EOM and lids are normal. Pupils are equal, round, and reactive to light. No scleral icterus.   Neck: Normal range of motion. Neck supple. Carotid bruit is not present. No thyroid mass and no thyromegaly present.   Cardiovascular: Normal rate, regular rhythm and normal heart sounds.   No extrasystoles are present. PMI is not displaced.  Exam reveals no gallop.    No murmur heard.  Pulmonary/Chest: Effort normal and breath sounds normal. No accessory muscle usage. No respiratory distress.   Clear to auscultation bilaterally.   Abdominal: Soft. Normal appearance and bowel sounds are normal. She exhibits no abdominal bruit. There is no hepatosplenomegaly. There is no tenderness. There is no rebound.   Musculoskeletal:   Toenails are dystrophic consistent with onychomycosis  "  Lymphadenopathy:        Head (right side): No submental and no submandibular adenopathy present.        Head (left side): No submental and no submandibular adenopathy present.        Right cervical: No superficial cervical, no deep cervical and no posterior cervical adenopathy present.       Left cervical: No superficial cervical, no deep cervical and no posterior cervical adenopathy present.        Right: No supraclavicular adenopathy present.        Left: No supraclavicular adenopathy present.   Neurological: She is alert and oriented to person, place, and time.   Skin: Skin is warm, dry and intact.   Psychiatric: She has a normal mood and affect.     Blood pressure 124/70, pulse 80, temperature 98.6 °F (37 °C), temperature source Oral, resp. rate 18, height 5' 4" (1.626 m), weight 83.7 kg (184 lb 8.4 oz), last menstrual period 02/16/2018.Body mass index is 31.67 kg/m².      A/P:  1)  hypertension.  Well-controlled.  Continue with Maxide.  She will continue her progress with diet exercise and weight loss  2)  prediabetes.  Improved.  We will recheck this value in 12 months or sooner if needed   3)  obesity with a BMI 31.  She'll continue her efforts at diet exercise and weight loss.  I did refill belviq for 3 more months  4)  onychomycosis.  Persistent.  I did describe the Lamisil.  She is aware of the side effects of this medication including the risks for hepatotoxicity.  She will take this for 3 months and check LFTs at 6 weeks  "

## 2018-04-27 ENCOUNTER — LAB VISIT (OUTPATIENT)
Dept: LAB | Facility: HOSPITAL | Age: 43
End: 2018-04-27
Attending: FAMILY MEDICINE
Payer: COMMERCIAL

## 2018-04-27 DIAGNOSIS — K76.0 FATTY LIVER: ICD-10-CM

## 2018-04-27 LAB
ALBUMIN SERPL BCP-MCNC: 3.7 G/DL
ALP SERPL-CCNC: 64 U/L
ALT SERPL W/O P-5'-P-CCNC: 30 U/L
AST SERPL-CCNC: 27 U/L
BILIRUB DIRECT SERPL-MCNC: 0.2 MG/DL
BILIRUB SERPL-MCNC: 0.5 MG/DL
PROT SERPL-MCNC: 8.3 G/DL

## 2018-04-27 PROCEDURE — 80076 HEPATIC FUNCTION PANEL: CPT

## 2018-04-27 PROCEDURE — 36415 COLL VENOUS BLD VENIPUNCTURE: CPT | Mod: PO

## 2018-04-30 ENCOUNTER — PATIENT MESSAGE (OUTPATIENT)
Dept: FAMILY MEDICINE | Facility: CLINIC | Age: 43
End: 2018-04-30

## 2018-04-30 RX ORDER — TERBINAFINE HYDROCHLORIDE 250 MG/1
250 TABLET ORAL DAILY
Qty: 30 TABLET | Refills: 0 | Status: SHIPPED | OUTPATIENT
Start: 2018-04-30 | End: 2018-05-30

## 2018-05-14 ENCOUNTER — PATIENT MESSAGE (OUTPATIENT)
Dept: FAMILY MEDICINE | Facility: CLINIC | Age: 43
End: 2018-05-14

## 2018-06-25 ENCOUNTER — HOSPITAL ENCOUNTER (OUTPATIENT)
Dept: RADIOLOGY | Facility: HOSPITAL | Age: 43
Discharge: HOME | End: 2018-06-25
Attending: FAMILY MEDICINE
Payer: COMMERCIAL

## 2018-06-25 ENCOUNTER — PATIENT MESSAGE (OUTPATIENT)
Dept: FAMILY MEDICINE | Facility: CLINIC | Age: 43
End: 2018-06-25

## 2018-06-25 ENCOUNTER — OFFICE VISIT (OUTPATIENT)
Dept: FAMILY MEDICINE | Facility: CLINIC | Age: 43
End: 2018-06-25
Payer: COMMERCIAL

## 2018-06-25 VITALS
BODY MASS INDEX: 30.16 KG/M2 | TEMPERATURE: 98 F | RESPIRATION RATE: 18 BRPM | HEART RATE: 76 BPM | HEIGHT: 64 IN | DIASTOLIC BLOOD PRESSURE: 76 MMHG | WEIGHT: 176.63 LBS | SYSTOLIC BLOOD PRESSURE: 132 MMHG

## 2018-06-25 DIAGNOSIS — M25.579 ANKLE PAIN, UNSPECIFIED CHRONICITY, UNSPECIFIED LATERALITY: ICD-10-CM

## 2018-06-25 DIAGNOSIS — M25.579 ANKLE PAIN, UNSPECIFIED CHRONICITY, UNSPECIFIED LATERALITY: Primary | ICD-10-CM

## 2018-06-25 DIAGNOSIS — I10 HYPERTENSION, ESSENTIAL: ICD-10-CM

## 2018-06-25 DIAGNOSIS — S82.899A CLOSED FRACTURE OF ANKLE, UNSPECIFIED LATERALITY, INITIAL ENCOUNTER: Primary | ICD-10-CM

## 2018-06-25 DIAGNOSIS — R10.2 PELVIC PAIN: ICD-10-CM

## 2018-06-25 DIAGNOSIS — R10.84 GENERALIZED ABDOMINAL PAIN: ICD-10-CM

## 2018-06-25 DIAGNOSIS — E66.9 OBESITY (BMI 30.0-34.9): ICD-10-CM

## 2018-06-25 PROCEDURE — 73610 X-RAY EXAM OF ANKLE: CPT | Mod: TC,FY,PO,RT

## 2018-06-25 PROCEDURE — 3078F DIAST BP <80 MM HG: CPT | Mod: CPTII,S$GLB,, | Performed by: FAMILY MEDICINE

## 2018-06-25 PROCEDURE — 3008F BODY MASS INDEX DOCD: CPT | Mod: CPTII,S$GLB,, | Performed by: FAMILY MEDICINE

## 2018-06-25 PROCEDURE — 99214 OFFICE O/P EST MOD 30 MIN: CPT | Mod: S$GLB,,, | Performed by: FAMILY MEDICINE

## 2018-06-25 PROCEDURE — 73610 X-RAY EXAM OF ANKLE: CPT | Mod: 26,RT,, | Performed by: RADIOLOGY

## 2018-06-25 PROCEDURE — 3075F SYST BP GE 130 - 139MM HG: CPT | Mod: CPTII,S$GLB,, | Performed by: FAMILY MEDICINE

## 2018-06-26 ENCOUNTER — HOSPITAL ENCOUNTER (OUTPATIENT)
Dept: RADIOLOGY | Facility: HOSPITAL | Age: 43
Discharge: HOME | End: 2018-06-26
Attending: FAMILY MEDICINE
Payer: COMMERCIAL

## 2018-06-26 ENCOUNTER — TELEPHONE (OUTPATIENT)
Dept: FAMILY MEDICINE | Facility: CLINIC | Age: 43
End: 2018-06-26

## 2018-06-26 DIAGNOSIS — R10.84 GENERALIZED ABDOMINAL PAIN: ICD-10-CM

## 2018-06-26 DIAGNOSIS — R10.84 GENERALIZED ABDOMINAL PAIN: Primary | ICD-10-CM

## 2018-06-26 PROCEDURE — 74177 CT ABD & PELVIS W/CONTRAST: CPT | Mod: TC,PO

## 2018-06-26 PROCEDURE — 74177 CT ABD & PELVIS W/CONTRAST: CPT | Mod: 26,,, | Performed by: RADIOLOGY

## 2018-06-26 PROCEDURE — 25500020 PHARM REV CODE 255: Mod: PO | Performed by: FAMILY MEDICINE

## 2018-06-26 RX ADMIN — IOHEXOL 75 ML: 350 INJECTION, SOLUTION INTRAVENOUS at 12:06

## 2018-06-26 RX ADMIN — IOHEXOL 30 ML: 350 INJECTION, SOLUTION INTRAVENOUS at 12:06

## 2018-06-26 NOTE — TELEPHONE ENCOUNTER
Notified pt of results and provider instructions. Pt stated verbal understanding. Appointments scheduled. Time and date confirmed with pt.

## 2018-06-26 NOTE — PROGRESS NOTES
Subjective:       Patient ID: Ana Dorantes is a 43 y.o. female.    Chief Complaint: Hypertension (3 month follow up ) and Rt ankle pain (Stepped in man hole, a week ago)    HPI   The patient is a 43-year-old who is here today for chronic follow-up.  Today we discussed the followin)  Right ankle injury.  On , she stepped in a manhole and twisted her ankle.  When she did this, she heard and felt a crunch in her ankle.  She was initially able to walk on it and so she decided to manage this at home without seeking medical attention initially.  She has been using ice and elevation for ankle but her ankle pain and swelling persisted chief  2) abdominal cramping.  She has recently been experiencing intense episodes of abdominal cramping.  These episodes of abdominal cramping usually happen twice a month and last for 4-6 minutes at a time.  When she has the cramping, she feels a punch of pain and feels as if she is going to pass out.  The cramping will start in her low back and moved down towards her ovary/uterus.  She typically experiences this pain twice a month usually 1 or 2 weeks before her menstrual cycle and then maybe 1 more time during her menstrual cycle.  She denies any dysuria or hematuria  3)  Obesity.  She has been making progress with diet exercise and weight loss.  She is continuing with her Belviq which she is tolerating well.  She has been bothered by extra skin on the back of her arms and below her buttocks.  She is scheduled for plastic surgery to address these issues early next week.  She has lost 20 lb and hopes to lose another 26 lb.  Her goal weight is 150 lb     Review of systems is remarkable for a chemical smell that comes from her throat.  She thought this may be due to a tonsil stone that she experienced 1 time.  She denies any acid reflux currently.    Review of Systems   Constitutional: Negative for activity change, appetite change, chills, diaphoresis, fatigue, fever and  unexpected weight change.   HENT: Negative for congestion, ear pain, hearing loss, postnasal drip, rhinorrhea, sinus pressure, sneezing, sore throat and trouble swallowing.    Eyes: Negative for pain, discharge and visual disturbance.   Respiratory: Negative for cough, chest tightness, shortness of breath and wheezing.    Cardiovascular: Negative for chest pain, palpitations and leg swelling.   Gastrointestinal: Negative for abdominal distention, abdominal pain, blood in stool, constipation, diarrhea, nausea and vomiting.   Endocrine: Negative for polydipsia and polyuria.   Genitourinary: Negative for difficulty urinating, dysuria, hematuria and menstrual problem.   Musculoskeletal: Negative for arthralgias, joint swelling and neck pain.   Skin: Negative for rash.   Neurological: Negative for weakness and headaches.   Psychiatric/Behavioral: Negative for confusion and dysphoric mood.       Objective:      Physical Exam   Constitutional: She is oriented to person, place, and time. She appears well-developed and well-nourished. No distress.   HENT:   Head: Normocephalic and atraumatic.   Right Ear: Hearing, tympanic membrane, external ear and ear canal normal.   Left Ear: Hearing, tympanic membrane, external ear and ear canal normal.   Nose: Nose normal.   Mouth/Throat: Oropharynx is clear and moist and mucous membranes are normal. No oral lesions. No oropharyngeal exudate, posterior oropharyngeal edema or posterior oropharyngeal erythema.   Eyes: Conjunctivae, EOM and lids are normal. Pupils are equal, round, and reactive to light. No scleral icterus.   Neck: Normal range of motion. Neck supple. Carotid bruit is not present. No thyroid mass and no thyromegaly present.   Cardiovascular: Normal rate, regular rhythm and normal heart sounds.   No extrasystoles are present. PMI is not displaced.  Exam reveals no gallop.    No murmur heard.  Pulmonary/Chest: Effort normal and breath sounds normal. No accessory muscle  "usage. No respiratory distress.   Clear to auscultation bilaterally.   Abdominal: Soft. Normal appearance and bowel sounds are normal. She exhibits no abdominal bruit. There is no hepatosplenomegaly. There is no tenderness. There is no rebound.   Lymphadenopathy:        Head (right side): No submental and no submandibular adenopathy present.        Head (left side): No submental and no submandibular adenopathy present.        Right cervical: No superficial cervical, no deep cervical and no posterior cervical adenopathy present.       Left cervical: No superficial cervical, no deep cervical and no posterior cervical adenopathy present.        Right: No supraclavicular adenopathy present.        Left: No supraclavicular adenopathy present.   Neurological: She is alert and oriented to person, place, and time.   Skin: Skin is warm, dry and intact.   Psychiatric: She has a normal mood and affect.     Blood pressure 132/76, pulse 76, temperature 98.4 °F (36.9 °C), temperature source Oral, resp. rate 18, height 5' 4" (1.626 m), weight 80.1 kg (176 lb 9.6 oz), last menstrual period 06/22/2018.Body mass index is 30.31 kg/m².            A/P:  1) right ankle pain over the lateral malleolus.  Acute and persistent.  New to me.  We will check an x-ray today to evaluate this further.  She will continue with rest ice and elevation    2) abdominal cramping.  Intermittent and persistent.  New to me.  We are going to check a UA to look for microscopic hematuria.  If this is present, we will do imaging to evaluate for nephrolithiasis.  If this is normal, we may consider a pelvic ultrasound  3) obesity.  Improving.  She will continue with the Belviq for the next 6 months.  We did discuss considering delaying plastic surgery until she has achieved her weight loss goal which she will consider  4) chemical smell.  Intermittent.  New to me.  We did discuss various explanations for her current symptoms.  She will keep a symptom diary and let " me know how she does.  If this persists, we may consider moving her back to a PPI       5) hypertension.  Well controlled.  Continue current medication    6)  Prediabetes.  Improved.  Continue with diet exercise and weight loss.  We will recheck an A1c in 6-12 months.

## 2018-06-26 NOTE — TELEPHONE ENCOUNTER
Notified by lab pt needs stat creatinine level. CT scheduled this morning for 11:30. Order pended. Need dx. Please advise.

## 2018-06-28 ENCOUNTER — OFFICE VISIT (OUTPATIENT)
Dept: ORTHOPEDICS | Facility: CLINIC | Age: 43
End: 2018-06-28
Payer: COMMERCIAL

## 2018-06-28 VITALS — BODY MASS INDEX: 30.05 KG/M2 | WEIGHT: 176 LBS | HEIGHT: 64 IN

## 2018-06-28 DIAGNOSIS — S82.61XA CLOSED DISPLACED FRACTURE OF LATERAL MALLEOLUS OF RIGHT FIBULA, INITIAL ENCOUNTER: ICD-10-CM

## 2018-06-28 DIAGNOSIS — M25.571 ACUTE RIGHT ANKLE PAIN: Primary | ICD-10-CM

## 2018-06-28 DIAGNOSIS — S82.831A CLOSED FRACTURE OF DISTAL END OF RIGHT FIBULA, UNSPECIFIED FRACTURE MORPHOLOGY, INITIAL ENCOUNTER: ICD-10-CM

## 2018-06-28 PROCEDURE — 97760 ORTHOTIC MGMT&TRAING 1ST ENC: CPT | Mod: S$GLB,,, | Performed by: ORTHOPAEDIC SURGERY

## 2018-06-28 PROCEDURE — 99243 OFF/OP CNSLTJ NEW/EST LOW 30: CPT | Mod: 57,S$GLB,, | Performed by: ORTHOPAEDIC SURGERY

## 2018-06-28 PROCEDURE — 99999 PR PBB SHADOW E&M-EST. PATIENT-LVL II: CPT | Mod: PBBFAC,,, | Performed by: ORTHOPAEDIC SURGERY

## 2018-06-28 PROCEDURE — 27786 TREATMENT OF ANKLE FRACTURE: CPT | Mod: RT,S$GLB,, | Performed by: ORTHOPAEDIC SURGERY

## 2018-06-28 NOTE — PROGRESS NOTES
Ana Dorantes, a 43-year-old, twisted her ankle in [place] about 14 days   ago, has been hurting since then.  The patient is seen as a consult from Dr. Barrios.  Communication via [facility].  Pain is 6/10 on good days, 8/10 on bad days,   worse with activity and relieved with rest.  Wrapping it seems to help.    PHYSICAL EXAMINATION:  Today shows she is tender at the distal fibula, nontender   medially.  Skin is intact.  Compartments are soft, well perfused distally.    Nontender at fifth metatarsal.    X-rays show an avulsion fracture of the distal fibula.    ASSESSMENT:  Right distal fibular avulsion fracture.    PLAN:  We will get her a boot.  She can weightbear to tolerance.  She can work   on gentle daily range of motion.  We will have her come back in a few weeks'   time as a postoperative visit with x-rays of her right ankle.      PBB/HN  dd: 2018 16:02:48 (CDT)  td: 2018 11:38:40 (CDT)  Doc ID   #[id number]  Job ID #[id number]    CC:     Further History  Aching pain  Worse with activity  Relieved with rest  No other associated symptoms  No other radiation    Further Exam  Alert and oriented  Pleasant  Contralateral limb has appropriate range of motion for age and condition  Contralateral limb has appropriate strength for age and condition  Contralateral limb has appropriate stability  for age and condition  No adenopathy  Pulses are appropriate for current condition  Skin is intact        Chief Complaint    Chief Complaint   Patient presents with    Right Foot - Pain    Right Ankle - Pain       HPI  Ana Dorantes is a 43 y.o.  female who presents with       Past Medical History  Past Medical History:   Diagnosis Date    Fatty liver     GERD (gastroesophageal reflux disease)     Hypertension     IFG (impaired fasting glucose)     Prediabetes        Past Surgical History  Past Surgical History:   Procedure Laterality Date    BELT ABDOMINOPLASTY       SECTION      x 2    NASAL  SEPTUM SURGERY         Medications  Current Outpatient Prescriptions   Medication Sig    fluticasone (FLONASE) 50 mcg/actuation nasal spray 2 sprays by Each Nare route once daily. (Patient taking differently: 2 sprays by Each Nare route daily as needed. )    lorcaserin (BELVIQ XR) 20 mg Tb24 Take 20 mg by mouth once daily.    triamterene-hydrochlorothiazide 37.5-25 mg (MAXZIDE-25) 37.5-25 mg per tablet TAKE 1 TABLET BY MOUTH ONCE DAILY.TO EARLY    azelastine (ASTELIN) 137 mcg (0.1 %) nasal spray 1 spray (137 mcg total) by Nasal route 2 (two) times daily.    epinephrine (EPIPEN) 0.3 mg/0.3 mL AtIn Inject 0.3 mLs (0.3 mg total) into the muscle once.    ranitidine (ZANTAC) 300 MG tablet Take 1 tablet (300 mg total) by mouth every evening. (Patient taking differently: Take 300 mg by mouth nightly as needed. )     No current facility-administered medications for this visit.        Allergies  Review of patient's allergies indicates:   Allergen Reactions    No known drug allergies        Family History  Family History   Problem Relation Age of Onset    Hypertension Mother     Allergic rhinitis Neg Hx     Allergies Neg Hx     Angioedema Neg Hx     Asthma Neg Hx     Atopy Neg Hx     Eczema Neg Hx     Immunodeficiency Neg Hx     Rhinitis Neg Hx     Urticaria Neg Hx        Social History  Social History     Social History    Marital status:      Spouse name: N/A    Number of children: N/A    Years of education: N/A     Occupational History    Not on file.     Social History Main Topics    Smoking status: Never Smoker    Smokeless tobacco: Never Used    Alcohol use No    Drug use: No    Sexual activity: Yes     Birth control/ protection: Other-see comments      Comment: vasectomy     Other Topics Concern    Not on file     Social History Narrative    No narrative on file               Review of Systems     Constitutional: Negative    HENT: Negative  Eyes: Negative  Respiratory:  Negative  Cardiovascular: Negative  Musculoskeletal: HPI  Skin: Negative  Neurological: Negative  Hematological: Negative  Endocrine: Negative                 Physical Exam    There were no vitals filed for this visit.  Body mass index is 30.21 kg/m².  Physical Examination:     General appearance -  well appearing, and in no distress  Mental status - awake  Neck - supple  Chest -  symmetric air entry  Heart - normal rate   Abdomen - soft      Assessment     1. Acute right ankle pain    2. Closed displaced fracture of lateral malleolus of right fibula, initial encounter    3. Closed fracture of distal end of right fibula, unspecified fracture morphology, initial encounter          PlanWe performed a custom orthotic/brace fitting, adjusting and training with the patient. The patient demonstrated understanding and proper care. This was performed for 15 minutes.

## 2018-06-28 NOTE — LETTER
June 28, 2018      Gina Barrios MD  [address] [address] [address]           [facility] [address] [facility] [address] [place] [address]  Phone: [phone]          Patient: Ana Dorantes   MR Number: [id number]   YOB: 1975   Date of Visit: 6/28/2018       Dear Dr. Gina Barrios:    Thank you for referring Ana Dorantes to me for evaluation. Attached you will find relevant portions of my assessment and plan of care.    If you have questions, please do not hesitate to call me. I look forward to following Ana Dorantes along with you.    Sincerely,    Nick Hope MD    Enclosure  CC:  No Recipients    If you would like to receive this communication electronically, please contact [email or web address] or [phone] to request more information on [employer] Link access.    For providers and/or their staff who would like to refer a patient to [facility], please contact us through our one-stop-shop provider referral line, [facility], at [phone].    If you feel you have received this communication in error or would no longer like to receive these types of communications, please e-mail [email or web address]

## 2018-06-30 ENCOUNTER — PATIENT MESSAGE (OUTPATIENT)
Dept: FAMILY MEDICINE | Facility: CLINIC | Age: 43
End: 2018-06-30

## 2018-07-16 ENCOUNTER — PATIENT MESSAGE (OUTPATIENT)
Dept: FAMILY MEDICINE | Facility: CLINIC | Age: 43
End: 2018-07-16

## 2018-07-24 DIAGNOSIS — M25.571 ACUTE RIGHT ANKLE PAIN: Primary | ICD-10-CM

## 2018-07-26 ENCOUNTER — HOSPITAL ENCOUNTER (OUTPATIENT)
Dept: RADIOLOGY | Facility: HOSPITAL | Age: 43
Discharge: HOME | End: 2018-07-26
Attending: ORTHOPAEDIC SURGERY
Payer: COMMERCIAL

## 2018-07-26 ENCOUNTER — OFFICE VISIT (OUTPATIENT)
Dept: ORTHOPEDICS | Facility: CLINIC | Age: 43
End: 2018-07-26
Payer: COMMERCIAL

## 2018-07-26 VITALS — BODY MASS INDEX: 30.05 KG/M2 | HEIGHT: 64 IN | WEIGHT: 176 LBS

## 2018-07-26 DIAGNOSIS — M25.571 ACUTE RIGHT ANKLE PAIN: ICD-10-CM

## 2018-07-26 DIAGNOSIS — S82.61XD CLOSED DISPLACED FRACTURE OF LATERAL MALLEOLUS OF RIGHT FIBULA WITH ROUTINE HEALING, SUBSEQUENT ENCOUNTER: Primary | ICD-10-CM

## 2018-07-26 DIAGNOSIS — M25.571 RIGHT ANKLE PAIN, UNSPECIFIED CHRONICITY: ICD-10-CM

## 2018-07-26 PROCEDURE — 99024 POSTOP FOLLOW-UP VISIT: CPT | Mod: S$GLB,,, | Performed by: ORTHOPAEDIC SURGERY

## 2018-07-26 PROCEDURE — 99999 PR PBB SHADOW E&M-EST. PATIENT-LVL II: CPT | Mod: PBBFAC,,, | Performed by: ORTHOPAEDIC SURGERY

## 2018-07-26 PROCEDURE — 73610 X-RAY EXAM OF ANKLE: CPT | Mod: 26,RT,, | Performed by: RADIOLOGY

## 2018-07-26 PROCEDURE — 73610 X-RAY EXAM OF ANKLE: CPT | Mod: TC,PO,RT

## 2018-07-26 NOTE — PROGRESS NOTES
HISTORY OF PRESENT ILLNESS:  About 6 weeks out from distal fibular avulsion   fracture, doing well, nontender.    X-rays look good.    PLAN:  Continue to wean the use of the boot.  Follow up in six weeks' time as a   postoperative visit with x-rays of her ankle.      CUCA/MARCELINO  dd: 07/26/2018 14:05:16 (CDT)  td: 07/27/2018 01:27:10 (CDT)  Doc ID   #[id number]  Job ID #[id number]    CC:

## 2018-08-10 RX ORDER — TRIAMTERENE/HYDROCHLOROTHIAZID 37.5-25 MG
TABLET ORAL
Qty: 90 TABLET | Refills: 1 | Status: SHIPPED | OUTPATIENT
Start: 2018-08-10 | End: 2019-06-23 | Stop reason: SDUPTHER

## 2018-08-30 ENCOUNTER — TELEPHONE (OUTPATIENT)
Dept: FAMILY MEDICINE | Facility: CLINIC | Age: 43
End: 2018-08-30

## 2018-08-30 NOTE — TELEPHONE ENCOUNTER
PA request received from [facility] [phone] for Lorcaserin 20mg XR.     [phone]      PA initiated via covermeds  Key: [id number]      Awaiting response.

## 2018-09-04 ENCOUNTER — TELEPHONE (OUTPATIENT)
Dept: FAMILY MEDICINE | Facility: CLINIC | Age: 43
End: 2018-09-04

## 2018-09-04 NOTE — TELEPHONE ENCOUNTER
Recevied from [facility] that the Belviq XR has been approved for 6 month.  Approved through 2/28/19. Pharmacy notified as well as patient.

## 2018-09-05 ENCOUNTER — TELEPHONE (OUTPATIENT)
Dept: FAMILY MEDICINE | Facility: CLINIC | Age: 43
End: 2018-09-05

## 2018-09-05 DIAGNOSIS — R10.2 PELVIC PAIN: Primary | ICD-10-CM

## 2018-09-05 NOTE — TELEPHONE ENCOUNTER
Pt was seen in June and mentioned having abdominal cramps, near her ovaries that radiates to her back. Pain is associated with times of right before or after her cycle. Pelvic ultrasound was to be considered if pain persists. Pt would like the ultrasound ordered . Pls advise.--lp

## 2018-12-21 ENCOUNTER — PATIENT MESSAGE (OUTPATIENT)
Dept: FAMILY MEDICINE | Facility: CLINIC | Age: 43
End: 2018-12-21

## 2018-12-26 ENCOUNTER — HOSPITAL ENCOUNTER (OUTPATIENT)
Dept: RADIOLOGY | Facility: HOSPITAL | Age: 43
Discharge: HOME | End: 2018-12-26
Attending: FAMILY MEDICINE
Payer: COMMERCIAL

## 2018-12-26 DIAGNOSIS — R10.2 PELVIC PAIN: ICD-10-CM

## 2018-12-26 PROCEDURE — 76856 US EXAM PELVIC COMPLETE: CPT | Mod: TC,PO

## 2018-12-26 PROCEDURE — 76830 TRANSVAGINAL US NON-OB: CPT | Mod: 26,,, | Performed by: RADIOLOGY

## 2018-12-26 PROCEDURE — 76856 US EXAM PELVIC COMPLETE: CPT | Mod: 26,,, | Performed by: RADIOLOGY

## 2018-12-26 PROCEDURE — 76830 TRANSVAGINAL US NON-OB: CPT | Mod: TC,PO

## 2018-12-27 ENCOUNTER — PATIENT MESSAGE (OUTPATIENT)
Dept: FAMILY MEDICINE | Facility: CLINIC | Age: 43
End: 2018-12-27

## 2018-12-27 DIAGNOSIS — R93.89 ABNORMAL PELVIC ULTRASOUND: Primary | ICD-10-CM

## 2018-12-31 ENCOUNTER — HOSPITAL ENCOUNTER (OUTPATIENT)
Dept: RADIOLOGY | Facility: HOSPITAL | Age: 43
Discharge: HOME | End: 2018-12-31
Attending: OBSTETRICS & GYNECOLOGY
Payer: COMMERCIAL

## 2018-12-31 ENCOUNTER — OFFICE VISIT (OUTPATIENT)
Dept: OBSTETRICS AND GYNECOLOGY | Facility: CLINIC | Age: 43
End: 2018-12-31
Payer: COMMERCIAL

## 2018-12-31 VITALS
BODY MASS INDEX: 29.74 KG/M2 | SYSTOLIC BLOOD PRESSURE: 120 MMHG | WEIGHT: 173.31 LBS | DIASTOLIC BLOOD PRESSURE: 84 MMHG

## 2018-12-31 VITALS — HEIGHT: 64 IN | WEIGHT: 173 LBS | BODY MASS INDEX: 29.53 KG/M2

## 2018-12-31 DIAGNOSIS — Z12.39 SCREENING BREAST EXAMINATION: ICD-10-CM

## 2018-12-31 DIAGNOSIS — Z12.4 ENCOUNTER FOR PAP SMEAR OF CERVIX WITH HPV DNA COTESTING: ICD-10-CM

## 2018-12-31 DIAGNOSIS — Z01.419 ENCOUNTER FOR WELL WOMAN EXAM WITH ROUTINE GYNECOLOGICAL EXAM: Primary | ICD-10-CM

## 2018-12-31 DIAGNOSIS — R10.2 PELVIC PAIN IN FEMALE: ICD-10-CM

## 2018-12-31 PROCEDURE — 77063 BREAST TOMOSYNTHESIS BI: CPT | Mod: 26,,, | Performed by: RADIOLOGY

## 2018-12-31 PROCEDURE — 99386 PREV VISIT NEW AGE 40-64: CPT | Mod: S$GLB,,, | Performed by: OBSTETRICS & GYNECOLOGY

## 2018-12-31 PROCEDURE — 3079F DIAST BP 80-89 MM HG: CPT | Mod: CPTII,S$GLB,, | Performed by: OBSTETRICS & GYNECOLOGY

## 2018-12-31 PROCEDURE — 77067 SCR MAMMO BI INCL CAD: CPT | Mod: 26,,, | Performed by: RADIOLOGY

## 2018-12-31 PROCEDURE — 77067 MAMMO DIGITAL SCREENING BILAT WITH TOMOSYNTHESIS_CAD: ICD-10-PCS | Mod: 26,,, | Performed by: RADIOLOGY

## 2018-12-31 PROCEDURE — 77063 MAMMO DIGITAL SCREENING BILAT WITH TOMOSYNTHESIS_CAD: ICD-10-PCS | Mod: 26,,, | Performed by: RADIOLOGY

## 2018-12-31 PROCEDURE — 77067 SCR MAMMO BI INCL CAD: CPT | Mod: TC,PN

## 2018-12-31 PROCEDURE — 88175 CYTOPATH C/V AUTO FLUID REDO: CPT | Performed by: PATHOLOGY

## 2018-12-31 PROCEDURE — 87624 HPV HI-RISK TYP POOLED RSLT: CPT

## 2018-12-31 PROCEDURE — 88141 CYTOPATH C/V INTERPRET: CPT | Mod: ,,, | Performed by: PATHOLOGY

## 2018-12-31 PROCEDURE — 99999 PR PBB SHADOW E&M-EST. PATIENT-LVL IV: CPT | Mod: PBBFAC,,, | Performed by: OBSTETRICS & GYNECOLOGY

## 2018-12-31 PROCEDURE — 3074F SYST BP LT 130 MM HG: CPT | Mod: CPTII,S$GLB,, | Performed by: OBSTETRICS & GYNECOLOGY

## 2018-12-31 RX ORDER — FLUCONAZOLE 150 MG/1
TABLET ORAL
COMMUNITY
Start: 2018-12-23 | End: 2019-05-15

## 2018-12-31 RX ORDER — SULFAMETHOXAZOLE AND TRIMETHOPRIM 800; 160 MG/1; MG/1
TABLET ORAL
COMMUNITY
Start: 2018-12-23 | End: 2019-03-14

## 2018-12-31 NOTE — PROGRESS NOTES
Subjective:       Patient ID: Ana Dorantes is a 43 y.o. female.    Chief Complaint:  Results and Pelvic Discomfort      History of Present Illness  HPI  43 y.o.  for annual exam and pap smear as well as follow up after pelvic ultrasound. Patient reports regular monthly menses. Patient reports episode of severe pelvic pain lasting 7 minutes one day in  and again this past month. No change in bowel or bladder function reported. Patient's  has vasectomy.Ultrasound done 18 revealed normal appearing uterus and left ovary. The right ovary is not enlarged and does have small cysts.LMP: 18    GYN & OB History  No LMP recorded.   Date of Last Pap: 3/22/2017    OB History    Para Term  AB Living   2 2 2         SAB TAB Ectopic Multiple Live Births                  # Outcome Date GA Lbr Rocael/2nd Weight Sex Delivery Anes PTL Lv   2 Term            1 Term                   Review of Systems  Review of Systems   Constitutional: Negative for activity change, appetite change, chills, diaphoresis, fatigue, fever and unexpected weight change.   HENT: Negative for nasal congestion, mouth sores and tinnitus.    Eyes: Negative for discharge and visual disturbance.   Respiratory: Negative for cough, shortness of breath and wheezing.    Cardiovascular: Negative for chest pain, palpitations and leg swelling.   Gastrointestinal: Negative for abdominal pain, bloating, blood in stool, constipation, diarrhea, nausea, vomiting, reflux and fecal incontinence.   Endocrine: Negative for diabetes, hair loss, hot flashes, hyperthyroidism and hypothyroidism.   Genitourinary: Positive for pelvic pain. Negative for bladder incontinence, decreased libido, dyspareunia, dysuria, flank pain, frequency, genital sores, hematuria, menorrhagia, menstrual problem, urgency, vaginal bleeding, vaginal discharge, vaginal pain, dysmenorrhea, urinary incontinence, postcoital bleeding, postmenopausal bleeding and  vaginal odor.   Musculoskeletal: Negative for arthralgias, back pain, joint swelling, leg pain and myalgias.   Neurological: Negative for vertigo, seizures, syncope, numbness and headaches.   Hematological: Negative for adenopathy. Does not bruise/bleed easily.   Psychiatric/Behavioral: Negative for depression and sleep disturbance. The patient is not nervous/anxious.    Breast: Negative for asymmetry, breast self exam, lump, mass, mastodynia, nipple discharge, skin changes and tenderness          Objective:    Physical Exam:   Constitutional: She is oriented to person, place, and time. She appears well-developed and well-nourished. No distress.    HENT:   Head: Normocephalic.   Nose: No epistaxis.    Eyes: Pupils are equal, round, and reactive to light.    Neck: Normal range of motion.    Cardiovascular: Normal rate.     Pulmonary/Chest: Effort normal.   Breasts: no palpable masses or nipple discharge        Abdominal: Soft. She exhibits no distension. There is no tenderness.     Genitourinary: Vagina normal and uterus normal. No vaginal discharge found.   Genitourinary Comments: Pap smear done of cervix.           Musculoskeletal: Normal range of motion and moves all extremeties.       Neurological: She is alert and oriented to person, place, and time.    Skin: Skin is warm and dry.    Psychiatric: She has a normal mood and affect. Her behavior is normal. Judgment and thought content normal.          Assessment:        1. Encounter for well woman exam with routine gynecological exam    2. Screening breast examination    3. Pelvic pain in female    4. Encounter for Pap smear of cervix with HPV DNA cotesting                Plan:      Pelvic ultrasound after onset of menses  Mammogram today.

## 2018-12-31 NOTE — LETTER
December 31, 2018      Gina Barrios MD  [address]  [facility] [address]           [facility]  [address] [address]  Phone: [phone]  Fax: [phone]          Patient: Ana Dorantes   MR Number: [id number]   YOB: 1975   Date of Visit: 12/31/2018       Dear Dr. Gina Barrios:    Thank you for referring Ana Dorantes to me for evaluation. Attached you will find relevant portions of my assessment and plan of care.    If you have questions, please do not hesitate to call me. I look forward to following Ana Dorantes along with you.    Sincerely,    Andrea Moreno MD    Enclosure  CC:  No Recipients    If you would like to receive this communication electronically, please contact [email or web address] or [phone] to request more information on [employer] Link access.    For providers and/or their staff who would like to refer a patient to [facility], please contact us through our one-stop-shop provider referral line, [facility], at [phone].    If you feel you have received this communication in error or would no longer like to receive these types of communications, please e-mail [email or web address]

## 2019-01-04 LAB
HPV HR 12 DNA CVX QL NAA+PROBE: NEGATIVE
HPV16 AG SPEC QL: NEGATIVE
HPV18 DNA SPEC QL NAA+PROBE: NEGATIVE

## 2019-02-06 ENCOUNTER — IMMUNIZATION (OUTPATIENT)
Dept: FAMILY MEDICINE | Facility: CLINIC | Age: 44
End: 2019-02-06
Payer: COMMERCIAL

## 2019-02-06 PROCEDURE — 90471 IMMUNIZATION ADMIN: CPT | Mod: S$GLB,,, | Performed by: INTERNAL MEDICINE

## 2019-02-06 PROCEDURE — 90686 FLU VACCINE (QUAD) GREATER THAN OR EQUAL TO 3YO PRESERVATIVE FREE IM: ICD-10-PCS | Mod: S$GLB,,, | Performed by: INTERNAL MEDICINE

## 2019-02-06 PROCEDURE — 90471 FLU VACCINE (QUAD) GREATER THAN OR EQUAL TO 3YO PRESERVATIVE FREE IM: ICD-10-PCS | Mod: S$GLB,,, | Performed by: INTERNAL MEDICINE

## 2019-02-06 PROCEDURE — 90686 IIV4 VACC NO PRSV 0.5 ML IM: CPT | Mod: S$GLB,,, | Performed by: INTERNAL MEDICINE

## 2019-03-14 ENCOUNTER — TELEPHONE (OUTPATIENT)
Dept: OTOLARYNGOLOGY | Facility: CLINIC | Age: 44
End: 2019-03-14

## 2019-03-14 ENCOUNTER — OFFICE VISIT (OUTPATIENT)
Dept: OTOLARYNGOLOGY | Facility: CLINIC | Age: 44
End: 2019-03-14
Payer: COMMERCIAL

## 2019-03-14 ENCOUNTER — LAB VISIT (OUTPATIENT)
Dept: LAB | Facility: HOSPITAL | Age: 44
End: 2019-03-14
Attending: NURSE PRACTITIONER
Payer: COMMERCIAL

## 2019-03-14 VITALS — HEIGHT: 64 IN | BODY MASS INDEX: 31.17 KG/M2 | WEIGHT: 182.56 LBS

## 2019-03-14 DIAGNOSIS — K14.3 HYPERTROPHY OF TONGUE PAPILLAE: Primary | ICD-10-CM

## 2019-03-14 DIAGNOSIS — K13.21 ORAL LEUKOPLAKIA: ICD-10-CM

## 2019-03-14 DIAGNOSIS — K13.21 LEUKOPLAKIA OF ORAL MUCOSA, INCLUDING TONGUE: ICD-10-CM

## 2019-03-14 DIAGNOSIS — K14.6 GLOSSODYNIA: ICD-10-CM

## 2019-03-14 DIAGNOSIS — K14.6 BMS (BURNING MOUTH SYNDROME): ICD-10-CM

## 2019-03-14 DIAGNOSIS — K14.3 COATED TONGUE: Primary | ICD-10-CM

## 2019-03-14 DIAGNOSIS — K14.3 COATED TONGUE: ICD-10-CM

## 2019-03-14 LAB
BASOPHILS # BLD AUTO: 0.03 K/UL
BASOPHILS NFR BLD: 0.6 %
CRP SERPL-MCNC: 16.6 MG/L
DIFFERENTIAL METHOD: NORMAL
EOSINOPHIL # BLD AUTO: 0.2 K/UL
EOSINOPHIL NFR BLD: 2.8 %
ERYTHROCYTE [DISTWIDTH] IN BLOOD BY AUTOMATED COUNT: 13.3 %
ERYTHROCYTE [SEDIMENTATION RATE] IN BLOOD BY WESTERGREN METHOD: 54 MM/HR
FERRITIN SERPL-MCNC: 252 NG/ML
FOLATE SERPL-MCNC: 12 NG/ML
GLUCOSE SERPL-MCNC: 118 MG/DL
HCT VFR BLD AUTO: 37.2 %
HGB BLD-MCNC: 12.4 G/DL
IMM GRANULOCYTES # BLD AUTO: 0.02 K/UL
IMM GRANULOCYTES NFR BLD AUTO: 0.4 %
LYMPHOCYTES # BLD AUTO: 2 K/UL
LYMPHOCYTES NFR BLD: 37.9 %
MCH RBC QN AUTO: 30 PG
MCHC RBC AUTO-ENTMCNC: 33.3 G/DL
MCV RBC AUTO: 90 FL
MONOCYTES # BLD AUTO: 0.5 K/UL
MONOCYTES NFR BLD: 9.7 %
NEUTROPHILS # BLD AUTO: 2.6 K/UL
NEUTROPHILS NFR BLD: 48.6 %
NRBC BLD-RTO: 0 /100 WBC
PLATELET # BLD AUTO: 337 K/UL
PMV BLD AUTO: 10.8 FL
RBC # BLD AUTO: 4.13 M/UL
VIT B12 SERPL-MCNC: 402 PG/ML
WBC # BLD AUTO: 5.28 K/UL

## 2019-03-14 PROCEDURE — 86039 ANTINUCLEAR ANTIBODIES (ANA): CPT

## 2019-03-14 PROCEDURE — 82728 ASSAY OF FERRITIN: CPT

## 2019-03-14 PROCEDURE — 82947 ASSAY GLUCOSE BLOOD QUANT: CPT

## 2019-03-14 PROCEDURE — 82746 ASSAY OF FOLIC ACID SERUM: CPT

## 2019-03-14 PROCEDURE — 82607 VITAMIN B-12: CPT

## 2019-03-14 PROCEDURE — 84252 ASSAY OF VITAMIN B-2: CPT

## 2019-03-14 PROCEDURE — 86140 C-REACTIVE PROTEIN: CPT

## 2019-03-14 PROCEDURE — 99214 OFFICE O/P EST MOD 30 MIN: CPT | Mod: S$GLB,,, | Performed by: NURSE PRACTITIONER

## 2019-03-14 PROCEDURE — 3008F PR BODY MASS INDEX (BMI) DOCUMENTED: ICD-10-PCS | Mod: CPTII,S$GLB,, | Performed by: NURSE PRACTITIONER

## 2019-03-14 PROCEDURE — 85651 RBC SED RATE NONAUTOMATED: CPT | Mod: PO

## 2019-03-14 PROCEDURE — 85025 COMPLETE CBC W/AUTO DIFF WBC: CPT

## 2019-03-14 PROCEDURE — 86038 ANTINUCLEAR ANTIBODIES: CPT

## 2019-03-14 PROCEDURE — 99214 PR OFFICE/OUTPT VISIT, EST, LEVL IV, 30-39 MIN: ICD-10-PCS | Mod: S$GLB,,, | Performed by: NURSE PRACTITIONER

## 2019-03-14 PROCEDURE — 84425 ASSAY OF VITAMIN B-1: CPT

## 2019-03-14 PROCEDURE — 99999 PR PBB SHADOW E&M-EST. PATIENT-LVL IV: ICD-10-PCS | Mod: PBBFAC,,, | Performed by: NURSE PRACTITIONER

## 2019-03-14 PROCEDURE — 86235 NUCLEAR ANTIGEN ANTIBODY: CPT | Mod: 59

## 2019-03-14 PROCEDURE — 84207 ASSAY OF VITAMIN B-6: CPT

## 2019-03-14 PROCEDURE — 84630 ASSAY OF ZINC: CPT

## 2019-03-14 PROCEDURE — 3008F BODY MASS INDEX DOCD: CPT | Mod: CPTII,S$GLB,, | Performed by: NURSE PRACTITIONER

## 2019-03-14 PROCEDURE — 99999 PR PBB SHADOW E&M-EST. PATIENT-LVL IV: CPT | Mod: PBBFAC,,, | Performed by: NURSE PRACTITIONER

## 2019-03-14 NOTE — PROGRESS NOTES
"Subjective:       Patient ID: Ana Dorantes is a 43 y.o. female.    Chief Complaint: Oral Pain ( x1 month c/o white tongue, swollen gums, burning)    HPI   Patient saw me in 2013 for rhinitis w/ETD. Patient saw me again for oral leukoplakia in 2017. Path report at that time was negative. She had food allergy testing done for eggs only, which was negative. No other food allergy testing was done. She continues to have episodes of severe burning sensation in mouth. Her mouth is "super sensitive." Patient reports severe burning sensation in mouth X 3-4 weeks now. She states she cannot even eat a PB&J sandwich; in fact, she has not been able to really eat anything since Tuesday because "everything mccartney." She reports excellent oral hygiene; she sees her dentist every 4 months for cleaning, and flosses daily.  Her dentist has voiced concerns over areas of leukoplakia in her mouth.     Review of Systems   Constitutional: Negative.    HENT: Positive for mouth sores.    Eyes: Negative.    Respiratory: Negative.    Cardiovascular: Negative.    Gastrointestinal: Negative.    Musculoskeletal: Negative.    Skin: Negative.    Neurological: Negative.    Hematological: Negative.    Psychiatric/Behavioral: Negative.        Objective:      Physical Exam   Constitutional: She is oriented to person, place, and time. Vital signs are normal. She appears well-developed and well-nourished. She is cooperative. She does not appear ill. No distress.   HENT:   Head: Normocephalic and atraumatic.   Right Ear: Hearing, tympanic membrane, external ear and ear canal normal. Tympanic membrane is not erythematous. No middle ear effusion.   Left Ear: Hearing, tympanic membrane, external ear and ear canal normal. Tympanic membrane is not erythematous.  No middle ear effusion.   Nose: Nose normal.   Mouth/Throat: Uvula is midline, oropharynx is clear and moist and mucous membranes are normal. Oral lesions (inflammation in areas along mandibular " molars at gum line and left retromolar trigone laterally) present. Normal dentition. No dental caries.   Eyes: EOM and lids are normal. Right eye exhibits no discharge. Left eye exhibits no discharge. No scleral icterus.   Neck: Trachea normal and normal range of motion. Neck supple. No tracheal deviation present.   Cardiovascular: Normal rate.   Pulmonary/Chest: Effort normal. No stridor. No respiratory distress. She has no wheezes.   Musculoskeletal: Normal range of motion.   Neurological: She is alert and oriented to person, place, and time. She has normal strength. Coordination and gait normal.   Skin: Skin is warm, dry and intact. She is not diaphoretic. No cyanosis. No pallor.   Psychiatric: She has a normal mood and affect. Her speech is normal and behavior is normal. Judgment and thought content normal. Cognition and memory are normal.   Nursing note and vitals reviewed.         Assessment:       1. Coated tongue    2. BMS (burning mouth syndrome)    3. Oral leukoplakia        Plan:     Consider food allergy testing    Prednisone mouth rinse mixture Q 4 hours  Fasting labs -- will notify pt of results as soon as available  Glossitis handout given  Reassured no evidence of thrush  Return if symptoms persist or worsen to see physician-ENT

## 2019-03-14 NOTE — TELEPHONE ENCOUNTER
----- Message from Alyssa Sapp sent at 3/14/2019  7:37 AM CDT -----  Contact: self [phone]  She is sorry, but she is running about 10 min late due tp traffic.  Thank you!

## 2019-03-14 NOTE — PATIENT INSTRUCTIONS
Labs: CBC, Fe, Zinc, Folate (B9), Thiamin (B1), Riboflavin (B2), Pyridoxine (B6), Cobalamin (B12), serum ferritin, and fasting glucose.     For burning painful tongue:    Glossitis labs can be ordered, must be fasting.  Avoid strong spices, cinnamon or peppermint, strong toothpaste or mouthwashes.   Clive diet.   No carbonated or citrus beverages.   Do not smoke or use tobacco.   Avoid abrasive activities: no hard candy or losenges, no tongue scraping or vigorous brushing. Avoid nuts.   Avoid using tongue to clean between teeth or rubbing tongue along tooth edges.   Avoid hot beverages.  Gargle with cool salt water. Eat popsicles.  Take pain relievers such as acetaminophen.  Visit your dentist right away if you have a sharp or broken tooth or ill-fitting dentures.  Recommend allergist to rule out food allergies.    For canker sores:    Apply a thin past of baking soda and water to the sore.  Mix 1 part hydrogen peroxide with 1 part water and apply this mixture to sores using a cotton swab.  For severe cases, treatments may include steroid gel (Lidex), antiinflammatory paste (Aphthasol), or antimicrobial mouthwash (Peridex).     To help cold sores or fever blisters:   Apply ice to the sore in addition to Blistex or Campho-Phenique as soon as the sore appears.   Wear a wide-brimmed hat to shade your face outdoors and a lip balm with SPF 15 or higher at all times.  Reduce stress and practice relaxation techniques like yoga or meditation.

## 2019-03-15 LAB
ANA SER QL IF: POSITIVE
ANA TITR SER IF: NORMAL {TITER}

## 2019-03-18 ENCOUNTER — TELEPHONE (OUTPATIENT)
Dept: OTOLARYNGOLOGY | Facility: CLINIC | Age: 44
End: 2019-03-18

## 2019-03-18 LAB
ANTI SM ANTIBODY: 1.01 EU
ANTI SM/RNP ANTIBODY: 1.07 EU
ANTI-SM INTERPRETATION: NEGATIVE
ANTI-SM/RNP INTERPRETATION: NEGATIVE
ANTI-SSA ANTIBODY: 2.66 EU
ANTI-SSA INTERPRETATION: NEGATIVE
ANTI-SSB ANTIBODY: 0.12 EU
ANTI-SSB INTERPRETATION: NEGATIVE
DSDNA AB SER-ACNC: NORMAL [IU]/ML
VIT B2 SERPL-MCNC: 10 MCG/L (ref 1–19)
ZINC SERPL-MCNC: 65 UG/DL (ref 60–130)

## 2019-03-18 NOTE — TELEPHONE ENCOUNTER
Spoke with patient wanted to know if specialist would be able to answer her questions about which auto immune disease she has. Advised to make appointment as soon as possible

## 2019-03-18 NOTE — TELEPHONE ENCOUNTER
----- Message from Brittney Wisdom NP sent at 3/16/2019  2:31 PM CDT -----  Your YAIR is positive. A referral has been placed for you to be evaluated by a rheumatologist. They specialize in auto-immune conditions, which is suggestive of your lab results. I recommend either Dr. Sánchez or Dr. Bingham here in [place].

## 2019-03-19 ENCOUNTER — TELEPHONE (OUTPATIENT)
Dept: RHEUMATOLOGY | Facility: CLINIC | Age: 44
End: 2019-03-19

## 2019-03-19 LAB — PYRIDOXAL SERPL-MCNC: 5 UG/L (ref 5–50)

## 2019-03-19 NOTE — TELEPHONE ENCOUNTER
----- Message from Heidi Marguerite sent at 3/19/2019  1:42 PM CDT -----  Contact: pt  ..Type:  Sooner Apoointment Request    Caller is requesting a sooner appointment.  Caller declined first available appointment listed below.  Caller will not accept being placed on the waitlist and is requesting a message be sent to doctor.    Name of Caller: pt  When is the first available appointment? None available  Symptoms: pt was referred by Dr Wisdom, pt not sure why she was referred  Best Call Back Number:    Additional Information: Pt has a referral in chart, need to schedule a NP appt  Please call pt to schedule  Thanks

## 2019-03-20 LAB — VIT B1 BLD-MCNC: 56 UG/L (ref 38–122)

## 2019-03-20 NOTE — TELEPHONE ENCOUNTER
Contacted patient regarding referral for new patient appointment. Scheduled patient with provider in [place]. Patient aware of date, time and provider of appointment.

## 2019-04-08 DIAGNOSIS — K14.6 BMS (BURNING MOUTH SYNDROME): ICD-10-CM

## 2019-05-01 ENCOUNTER — PATIENT OUTREACH (OUTPATIENT)
Dept: ADMINISTRATIVE | Facility: HOSPITAL | Age: 44
End: 2019-05-01

## 2019-05-01 NOTE — PROGRESS NOTES
Health Maintenance Due   Topic Date Due    Pneumococcal Vaccine (Medium Risk) (1 of 1 - PPSV23) 06/09/1993     Chart review complete/scrubbed 05/01/2019  Future Appointments   Date Time Provider Department Center   5/15/2019 10:30 AM Gina Barrios MD [facility]   5/20/2019 11:20 AM Toni Page MD [facility]

## 2019-05-15 ENCOUNTER — OFFICE VISIT (OUTPATIENT)
Dept: FAMILY MEDICINE | Facility: CLINIC | Age: 44
End: 2019-05-15
Payer: COMMERCIAL

## 2019-05-15 VITALS
HEIGHT: 64 IN | BODY MASS INDEX: 31.96 KG/M2 | RESPIRATION RATE: 16 BRPM | TEMPERATURE: 99 F | WEIGHT: 187.19 LBS | SYSTOLIC BLOOD PRESSURE: 124 MMHG | OXYGEN SATURATION: 98 % | DIASTOLIC BLOOD PRESSURE: 82 MMHG | HEART RATE: 75 BPM

## 2019-05-15 DIAGNOSIS — K76.0 FATTY LIVER: ICD-10-CM

## 2019-05-15 DIAGNOSIS — Z00.00 ANNUAL PHYSICAL EXAM: Primary | ICD-10-CM

## 2019-05-15 DIAGNOSIS — R73.03 PREDIABETES: ICD-10-CM

## 2019-05-15 DIAGNOSIS — E66.9 OBESITY (BMI 30-39.9): ICD-10-CM

## 2019-05-15 PROCEDURE — 99396 PR PREVENTIVE VISIT,EST,40-64: ICD-10-PCS | Mod: 25,S$GLB,, | Performed by: FAMILY MEDICINE

## 2019-05-15 PROCEDURE — 3074F SYST BP LT 130 MM HG: CPT | Mod: CPTII,S$GLB,, | Performed by: FAMILY MEDICINE

## 2019-05-15 PROCEDURE — 90732 PPSV23 VACC 2 YRS+ SUBQ/IM: CPT | Mod: S$GLB,,, | Performed by: FAMILY MEDICINE

## 2019-05-15 PROCEDURE — 90732 PNEUMOCOCCAL POLYSACCHARIDE VACCINE 23-VALENT =>2YO SQ IM: ICD-10-PCS | Mod: S$GLB,,, | Performed by: FAMILY MEDICINE

## 2019-05-15 PROCEDURE — 90471 IMMUNIZATION ADMIN: CPT | Mod: S$GLB,,, | Performed by: FAMILY MEDICINE

## 2019-05-15 PROCEDURE — 99396 PREV VISIT EST AGE 40-64: CPT | Mod: 25,S$GLB,, | Performed by: FAMILY MEDICINE

## 2019-05-15 PROCEDURE — 3079F PR MOST RECENT DIASTOLIC BLOOD PRESSURE 80-89 MM HG: ICD-10-PCS | Mod: CPTII,S$GLB,, | Performed by: FAMILY MEDICINE

## 2019-05-15 PROCEDURE — 3074F PR MOST RECENT SYSTOLIC BLOOD PRESSURE < 130 MM HG: ICD-10-PCS | Mod: CPTII,S$GLB,, | Performed by: FAMILY MEDICINE

## 2019-05-15 PROCEDURE — 90471 PNEUMOCOCCAL POLYSACCHARIDE VACCINE 23-VALENT =>2YO SQ IM: ICD-10-PCS | Mod: S$GLB,,, | Performed by: FAMILY MEDICINE

## 2019-05-15 PROCEDURE — 3079F DIAST BP 80-89 MM HG: CPT | Mod: CPTII,S$GLB,, | Performed by: FAMILY MEDICINE

## 2019-05-15 RX ORDER — CETIRIZINE HYDROCHLORIDE 10 MG/1
10 TABLET ORAL DAILY
COMMUNITY
End: 2020-06-12

## 2019-05-15 RX ORDER — BIOTIN 10 MG
TABLET ORAL
COMMUNITY
End: 2023-12-04

## 2019-05-15 NOTE — PATIENT INSTRUCTIONS
Electronic Weight Loss Tools                Mobile Apps  Calorie Counter & Diet Tracker by MyFitnessPal.com  Lose it!  Weight Watchers Mobile

## 2019-05-16 ENCOUNTER — TELEPHONE (OUTPATIENT)
Dept: RHEUMATOLOGY | Facility: CLINIC | Age: 44
End: 2019-05-16

## 2019-05-16 NOTE — TELEPHONE ENCOUNTER
----- Message from Heidijovan Everett sent at 5/16/2019  4:38 PM CDT -----  Contact: pt  .Type:  Sooner Apoointment Request    Caller is requesting a sooner appointment.  Caller declined first available appointment listed below.  Caller will not accept being placed on the waitlist and is requesting a message be sent to doctor.    Name of Caller: pt  When is the first available appointment?  n/a  Symptoms:  Referred by ENT  Best Call Back Number:    Additional Information:  Pt stated she has an appt on the [place] on 08-23-19 but would rather be seen on the [place]  Please call pt to schedule appt   Thanks

## 2019-05-16 NOTE — TELEPHONE ENCOUNTER
Returned patient call regarding new patient appointment. Patient informed office that has appointment scheduled at [facility] in August. Patient wanted to see if there was anything sooner than August. Nurse informed there is an appointment in July at [facility]. Nurse informed there are other providers at [facility] that just opened and will be able to see her as soon as next week. Patient refused [facility]. Wanted to keep July appointment at [facility] and schedule an appointment in [place] at first available. Patient aware of date and time of appointment in July at [facility] and the appointment in [place].

## 2019-05-22 NOTE — PROGRESS NOTES
Subjective:       Patient ID: Ana Dorantes is a 43 y.o. female.    Chief Complaint: Annual Exam and Ear Fullness (dizziness )    HPI   The patient is a 43-year-old who is here today for her annual exam.  Overall, she is doing well.  She is concerned about her weight and is interested in resuming Belviq for weight loss.  She is trying to improve her diet.  She is going to try to increase her exercise routine.  She is trying to walk regularly and recently participated in the [employer].  She is due for labs and would be willing to have those done but would like to delay the labs to some to give her an opportunity to make progress with diet exercise and weight loss.  She is due for her Pneumovax given her fatty liver disease and would be willing to have that    She is going to be seeing a rheumatologist soon after the ENT ordered some labs which showed abnormal values.  She denies any arthralgias    Review of Systems   Constitutional: Negative for appetite change, chills, diaphoresis, fatigue, fever and unexpected weight change.   HENT: Negative for congestion, dental problem, ear pain, hearing loss, postnasal drip, rhinorrhea, sinus pressure, sneezing, sore throat and trouble swallowing.    Eyes: Negative for photophobia, pain, discharge and visual disturbance.   Respiratory: Negative for cough, chest tightness, shortness of breath and wheezing.    Cardiovascular: Negative for chest pain, palpitations and leg swelling.   Gastrointestinal: Negative for abdominal distention, abdominal pain, blood in stool, constipation, diarrhea, nausea and vomiting.   Endocrine: Negative for cold intolerance, heat intolerance, polydipsia and polyuria.   Genitourinary: Negative for dysuria, flank pain, frequency, genital sores, hematuria, menstrual problem and vaginal discharge.   Musculoskeletal: Negative for arthralgias, joint swelling and myalgias.   Skin: Negative for rash.   Neurological: Negative for dizziness,  syncope, light-headedness and headaches.   Hematological: Negative for adenopathy. Does not bruise/bleed easily.   Psychiatric/Behavioral: Negative for dysphoric mood, self-injury, sleep disturbance and suicidal ideas. The patient is not nervous/anxious.        Objective:      Physical Exam   Constitutional: She is oriented to person, place, and time. She appears well-developed and well-nourished. No distress.   HENT:   Head: Normocephalic and atraumatic.   Right Ear: Hearing, tympanic membrane, external ear and ear canal normal.   Left Ear: Hearing, tympanic membrane, external ear and ear canal normal.   Nose: Nose normal.   Mouth/Throat: Oropharynx is clear and moist and mucous membranes are normal. No oral lesions. No oropharyngeal exudate, posterior oropharyngeal edema or posterior oropharyngeal erythema.   Eyes: Pupils are equal, round, and reactive to light. Conjunctivae, EOM and lids are normal. No scleral icterus.   Neck: Normal range of motion. Neck supple. Carotid bruit is not present. No thyroid mass and no thyromegaly present.   Cardiovascular: Normal rate, regular rhythm and normal heart sounds.  No extrasystoles are present. PMI is not displaced. Exam reveals no gallop.   No murmur heard.  Pulmonary/Chest: Effort normal and breath sounds normal. No accessory muscle usage. No respiratory distress.   Clear to auscultation bilaterally.   Abdominal: Soft. Normal appearance and bowel sounds are normal. She exhibits no abdominal bruit. There is no hepatosplenomegaly. There is no tenderness. There is no rebound.   Lymphadenopathy:        Head (right side): No submental and no submandibular adenopathy present.        Head (left side): No submental and no submandibular adenopathy present.        Right cervical: No superficial cervical, no deep cervical and no posterior cervical adenopathy present.       Left cervical: No superficial cervical, no deep cervical and no posterior cervical adenopathy present.         "Right: No supraclavicular adenopathy present.        Left: No supraclavicular adenopathy present.   Neurological: She is alert and oriented to person, place, and time. She has normal strength. No cranial nerve deficit or sensory deficit.   Skin: Skin is warm, dry and intact.   Psychiatric: She has a normal mood and affect. Her speech is normal and behavior is normal. Thought content normal. Cognition and memory are normal.     Blood pressure 124/82, pulse 75, temperature 98.7 °F (37.1 °C), temperature source Oral, resp. rate 16, height 5' 4" (1.626 m), weight 84.9 kg (187 lb 3.2 oz), last menstrual period 05/13/2019, SpO2 98 %.Body mass index is 32.13 kg/m².            A/P:  1) annual exam.  Health maintenance issues and anticipatory guidance issues were discussed.  She will work on diet exercise and weight loss.  We will check labs in 3 months.  She will receive her Pneumovax today.  2) obesity with a BMI of 32.  I did prescribe Belviq.  We will see her back in 3 months to see how she is done with this medicine skin is as a but is getting planned   3)  Positive YAIR.  New.  follow-up with rheumatology as planned      4) fatty liver disease.  Asymptomatic.  We will check LFTs in 3 months.  She will work on diet exercise and weight loss  5) pre diabetes.  Status unknown.  We will check an A1c with her next set of labs    I will see her back in 3-4 months for follow-up or sooner if needed          "

## 2019-06-23 RX ORDER — TRIAMTERENE/HYDROCHLOROTHIAZID 37.5-25 MG
TABLET ORAL
Qty: 90 TABLET | Refills: 1 | Status: SHIPPED | OUTPATIENT
Start: 2019-06-23 | End: 2020-02-21

## 2019-07-18 ENCOUNTER — TELEPHONE (OUTPATIENT)
Dept: RHEUMATOLOGY | Facility: CLINIC | Age: 44
End: 2019-07-18

## 2019-07-19 ENCOUNTER — OFFICE VISIT (OUTPATIENT)
Dept: RHEUMATOLOGY | Facility: CLINIC | Age: 44
End: 2019-07-19
Payer: COMMERCIAL

## 2019-07-19 ENCOUNTER — LAB VISIT (OUTPATIENT)
Dept: LAB | Facility: HOSPITAL | Age: 44
End: 2019-07-19
Attending: INTERNAL MEDICINE
Payer: COMMERCIAL

## 2019-07-19 VITALS
DIASTOLIC BLOOD PRESSURE: 84 MMHG | HEIGHT: 64 IN | HEART RATE: 89 BPM | SYSTOLIC BLOOD PRESSURE: 115 MMHG | WEIGHT: 189.81 LBS | BODY MASS INDEX: 32.41 KG/M2

## 2019-07-19 DIAGNOSIS — R76.8 POSITIVE ANA (ANTINUCLEAR ANTIBODY): ICD-10-CM

## 2019-07-19 DIAGNOSIS — B37.0 THRUSH, ORAL: ICD-10-CM

## 2019-07-19 DIAGNOSIS — R76.8 POSITIVE ANA (ANTINUCLEAR ANTIBODY): Primary | ICD-10-CM

## 2019-07-19 LAB
CRP SERPL-MCNC: 15.4 MG/L (ref 0–8.2)
ERYTHROCYTE [SEDIMENTATION RATE] IN BLOOD BY WESTERGREN METHOD: 67 MM/HR (ref 0–36)

## 2019-07-19 PROCEDURE — 84165 PROTEIN E-PHORESIS SERUM: CPT | Mod: 26,,, | Performed by: PATHOLOGY

## 2019-07-19 PROCEDURE — 86235 NUCLEAR ANTIGEN ANTIBODY: CPT

## 2019-07-19 PROCEDURE — 84165 PROTEIN E-PHORESIS SERUM: CPT

## 2019-07-19 PROCEDURE — 85652 RBC SED RATE AUTOMATED: CPT

## 2019-07-19 PROCEDURE — 84165 PATHOLOGIST INTERPRETATION SPE: ICD-10-PCS | Mod: 26,,, | Performed by: PATHOLOGY

## 2019-07-19 PROCEDURE — 86140 C-REACTIVE PROTEIN: CPT

## 2019-07-19 PROCEDURE — 36415 COLL VENOUS BLD VENIPUNCTURE: CPT

## 2019-07-19 PROCEDURE — 99203 OFFICE O/P NEW LOW 30 MIN: CPT | Mod: S$GLB,,, | Performed by: INTERNAL MEDICINE

## 2019-07-19 PROCEDURE — 99999 PR PBB SHADOW E&M-EST. PATIENT-LVL III: ICD-10-PCS | Mod: PBBFAC,,, | Performed by: INTERNAL MEDICINE

## 2019-07-19 PROCEDURE — 99999 PR PBB SHADOW E&M-EST. PATIENT-LVL III: CPT | Mod: PBBFAC,,, | Performed by: INTERNAL MEDICINE

## 2019-07-19 PROCEDURE — 99203 PR OFFICE/OUTPT VISIT, NEW, LEVL III, 30-44 MIN: ICD-10-PCS | Mod: S$GLB,,, | Performed by: INTERNAL MEDICINE

## 2019-07-19 RX ORDER — FLUCONAZOLE 100 MG/1
100 TABLET ORAL DAILY
Qty: 7 TABLET | Refills: 0 | Status: SHIPPED | OUTPATIENT
Start: 2019-07-19 | End: 2019-07-26

## 2019-07-19 ASSESSMENT — ROUTINE ASSESSMENT OF PATIENT INDEX DATA (RAPID3)
PSYCHOLOGICAL DISTRESS SCORE: 0
PATIENT GLOBAL ASSESSMENT SCORE: 0
WHEN YOU AWAKENED IN THE MORNING OVER THE LAST WEEK, PLEASE INDICATE THE AMOUNT OF TIME IT TAKES UNTIL YOU ARE AS LIMBER AS YOU WILL BE FOR THE DAY: 5MIN
TOTAL RAPID3 SCORE: .17
PAIN SCORE: .5
FATIGUE SCORE: 0
AM STIFFNESS SCORE: 1, YES
MDHAQ FUNCTION SCORE: 0

## 2019-07-19 NOTE — PROGRESS NOTES
"Subjective:       Patient ID: Ana Dorantes is a 44 y.o. female.    Chief Complaint: Disease Management    HPI   NP from Dr. Barrios  Not working ; taking care of mother 86 y/o    January had irritated gums and burning tongue; ENT did bloodwork and found +YAIR, elevated ESR and CRP  She had been eating a lot of sugar; she quit and mouth got better  4 weeks ago got some more mouth swelling after eating sugar    Denies dry eyes or mouth    Awakens with elbow pain  Sometimes fingers a little stiff  Sleeps with CPAP  Hx HTN    No psoriasis  No inflammatory bowel disease    One year ago had reaction to eggs; food allergy test was normal    Walks some    Review of Systems   Constitutional: Negative for fatigue, fever and unexpected weight change.   HENT: Negative for trouble swallowing.    Eyes: Negative for redness.   Respiratory: Negative for cough and shortness of breath.    Cardiovascular: Negative for chest pain.   Gastrointestinal: Negative for constipation and diarrhea.   Genitourinary: Negative for dysuria and genital sores.   Skin: Negative for rash.   Neurological: Negative for headaches.   Hematological: Does not bruise/bleed easily.         Objective:   /84   Pulse 89   Ht 5' 4" (1.626 m)   Wt 86.1 kg (189 lb 13.1 oz)   BMI 32.58 kg/m²      Physical Exam   Constitutional: She is well-developed, well-nourished, and in no distress.   HENT:   Mouth/Throat: Oropharynx is clear and moist.   Fullness of parotids and submandibulars  Tongue is white   Eyes: Conjunctivae are normal.   Cardiovascular: Normal rate and regular rhythm.    Pulmonary/Chest: She has no wheezes. She has no rales.   Abdominal:   Abdominal obesity   Lymphadenopathy:     She has no cervical adenopathy.   Skin: No rash noted.     No skin rashes   Musculoskeletal:   No joint abnormalities           March 2019 CBC nl  March 2018 nl CMP, U/A    Assessment:       1. Positive YAIR (antinuclear antibody)    2. Thrush, oral        No " clinical signs of CTD    She has abdominal obesity and hx elevated A1c ; I suspect oral sx were due to sugary diet and likely diabetes    Does look like pre-diabetes and oral thrush    Will re-check inflammatory markers but advised she keep f/u with Dr Barrios for  F/u A1c    Plan:       Problem List Items Addressed This Visit     None      Visit Diagnoses     Positive YAIR (antinuclear antibody)    -  Primary    Relevant Orders    C-reactive protein (Completed)    Sedimentation rate (Completed)    Protein electrophoresis, serum (Completed)    Sjogrens syndrome-A extractable nuclear antibody    Thrush, oral        Relevant Medications    fluconazole (DIFLUCAN) 100 MG tablet

## 2019-07-19 NOTE — PATIENT INSTRUCTIONS
"Lab today    Take anti-fungal for yeast for one week    If lab ok, avoid sugar and f/u with Dr Barrios regarding pre-diabetes "metabolic syndrome"  "

## 2019-07-19 NOTE — LETTER
July 22, 2019      Brittney Wisdom, DEEP  [address] [facility] [address] [place] [address]           [facility] - Rheumatology  [address]  [place] [address]  Phone: [phone]  Fax: [phone]          Patient: Ana Dorantes   MR Number: [id number]   YOB: 1975   Date of Visit: 7/19/2019       Dear Brittney Wisdom:    Thank you for referring Ana Dorantes to me for evaluation. Attached you will find relevant portions of my assessment and plan of care.    If you have questions, please do not hesitate to call me. I look forward to following Ana Dorantes along with you.    Sincerely,    Chris Kennedy MD    Enclosure  CC:  No Recipients    If you would like to receive this communication electronically, please contact [email or web address] or [phone] to request more information on [email or web address] Link access.    For providers and/or their staff who would like to refer a patient to [facility], please contact us through our one-stop-shop provider referral line, [facility], at [phone].    If you feel you have received this communication in error or would no longer like to receive these types of communications, please e-mail [email or web address]

## 2019-07-22 LAB
ALBUMIN SERPL ELPH-MCNC: 3.64 G/DL (ref 3.35–5.55)
ALPHA1 GLOB SERPL ELPH-MCNC: 0.3 G/DL (ref 0.17–0.41)
ALPHA2 GLOB SERPL ELPH-MCNC: 0.74 G/DL (ref 0.43–0.99)
ANTI-SSA ANTIBODY: 1.58 EU (ref 0–19.99)
ANTI-SSA INTERPRETATION: NEGATIVE
B-GLOBULIN SERPL ELPH-MCNC: 1.21 G/DL (ref 0.5–1.1)
GAMMA GLOB SERPL ELPH-MCNC: 1.71 G/DL (ref 0.67–1.58)
PATHOLOGIST INTERPRETATION SPE: NORMAL
PROT SERPL-MCNC: 7.6 G/DL (ref 6–8.4)

## 2019-07-24 ENCOUNTER — TELEPHONE (OUTPATIENT)
Dept: RHEUMATOLOGY | Facility: CLINIC | Age: 44
End: 2019-07-24

## 2019-07-24 ENCOUNTER — PATIENT MESSAGE (OUTPATIENT)
Dept: RHEUMATOLOGY | Facility: CLINIC | Age: 44
End: 2019-07-24

## 2019-07-24 NOTE — TELEPHONE ENCOUNTER
Phone call to discuss lab; no answer, no machine    Lab not diagnostic but persistent elevated inflammatory markers; will ask her to repeat in 6 m and f/u then to see if any new issues

## 2019-09-07 ENCOUNTER — PATIENT OUTREACH (OUTPATIENT)
Dept: ADMINISTRATIVE | Facility: HOSPITAL | Age: 44
End: 2019-09-07

## 2019-10-23 ENCOUNTER — TELEPHONE (OUTPATIENT)
Dept: FAMILY MEDICINE | Facility: CLINIC | Age: 44
End: 2019-10-23

## 2019-10-23 NOTE — TELEPHONE ENCOUNTER
Received fax from [employer] stating that Belviq is not covered by pts ins plan.  Attempt PA or change med?    KEY CODE:  [id number]

## 2020-02-20 ENCOUNTER — TELEPHONE (OUTPATIENT)
Dept: FAMILY MEDICINE | Facility: CLINIC | Age: 45
End: 2020-02-20

## 2020-02-21 RX ORDER — TRIAMTERENE/HYDROCHLOROTHIAZID 37.5-25 MG
TABLET ORAL
Qty: 90 TABLET | Refills: 0 | Status: SHIPPED | OUTPATIENT
Start: 2020-02-21 | End: 2020-05-22

## 2020-02-26 ENCOUNTER — LAB VISIT (OUTPATIENT)
Dept: LAB | Facility: HOSPITAL | Age: 45
End: 2020-02-26
Attending: FAMILY MEDICINE
Payer: COMMERCIAL

## 2020-02-26 DIAGNOSIS — Z00.00 ANNUAL PHYSICAL EXAM: ICD-10-CM

## 2020-02-26 LAB
ALBUMIN SERPL BCP-MCNC: 3.1 G/DL (ref 3.5–5.2)
ALP SERPL-CCNC: 98 U/L (ref 55–135)
ALT SERPL W/O P-5'-P-CCNC: 58 U/L (ref 10–44)
ANION GAP SERPL CALC-SCNC: 5 MMOL/L (ref 8–16)
AST SERPL-CCNC: 70 U/L (ref 10–40)
BASOPHILS # BLD AUTO: 0.04 K/UL (ref 0–0.2)
BASOPHILS NFR BLD: 0.6 % (ref 0–1.9)
BILIRUB SERPL-MCNC: 0.2 MG/DL (ref 0.1–1)
BUN SERPL-MCNC: 12 MG/DL (ref 6–20)
CALCIUM SERPL-MCNC: 9 MG/DL (ref 8.7–10.5)
CHLORIDE SERPL-SCNC: 102 MMOL/L (ref 95–110)
CHOLEST SERPL-MCNC: 173 MG/DL (ref 120–199)
CHOLEST/HDLC SERPL: 3.3 {RATIO} (ref 2–5)
CO2 SERPL-SCNC: 30 MMOL/L (ref 23–29)
CREAT SERPL-MCNC: 0.8 MG/DL (ref 0.5–1.4)
DIFFERENTIAL METHOD: ABNORMAL
EOSINOPHIL # BLD AUTO: 0.2 K/UL (ref 0–0.5)
EOSINOPHIL NFR BLD: 2.3 % (ref 0–8)
ERYTHROCYTE [DISTWIDTH] IN BLOOD BY AUTOMATED COUNT: 13.8 % (ref 11.5–14.5)
EST. GFR  (AFRICAN AMERICAN): >60 ML/MIN/1.73 M^2
EST. GFR  (NON AFRICAN AMERICAN): >60 ML/MIN/1.73 M^2
ESTIMATED AVG GLUCOSE: 163 MG/DL (ref 68–131)
GLUCOSE SERPL-MCNC: 167 MG/DL (ref 70–110)
HBA1C MFR BLD HPLC: 7.3 % (ref 4–5.6)
HCT VFR BLD AUTO: 41 % (ref 37–48.5)
HDLC SERPL-MCNC: 52 MG/DL (ref 40–75)
HDLC SERPL: 30.1 % (ref 20–50)
HGB BLD-MCNC: 12.7 G/DL (ref 12–16)
IMM GRANULOCYTES # BLD AUTO: 0.03 K/UL (ref 0–0.04)
IMM GRANULOCYTES NFR BLD AUTO: 0.5 % (ref 0–0.5)
LDLC SERPL CALC-MCNC: 78.6 MG/DL (ref 63–159)
LYMPHOCYTES # BLD AUTO: 2.2 K/UL (ref 1–4.8)
LYMPHOCYTES NFR BLD: 33.5 % (ref 18–48)
MCH RBC QN AUTO: 28.5 PG (ref 27–31)
MCHC RBC AUTO-ENTMCNC: 31 G/DL (ref 32–36)
MCV RBC AUTO: 92 FL (ref 82–98)
MONOCYTES # BLD AUTO: 0.4 K/UL (ref 0.3–1)
MONOCYTES NFR BLD: 6.4 % (ref 4–15)
NEUTROPHILS # BLD AUTO: 3.7 K/UL (ref 1.8–7.7)
NEUTROPHILS NFR BLD: 56.7 % (ref 38–73)
NONHDLC SERPL-MCNC: 121 MG/DL
NRBC BLD-RTO: 0 /100 WBC
PLATELET # BLD AUTO: 334 K/UL (ref 150–350)
PMV BLD AUTO: 10.6 FL (ref 9.2–12.9)
POTASSIUM SERPL-SCNC: 4 MMOL/L (ref 3.5–5.1)
PROT SERPL-MCNC: 8 G/DL (ref 6–8.4)
RBC # BLD AUTO: 4.45 M/UL (ref 4–5.4)
SODIUM SERPL-SCNC: 137 MMOL/L (ref 136–145)
TRIGL SERPL-MCNC: 212 MG/DL (ref 30–150)
TSH SERPL DL<=0.005 MIU/L-ACNC: 2.09 UIU/ML (ref 0.4–4)
WBC # BLD AUTO: 6.59 K/UL (ref 3.9–12.7)

## 2020-02-26 PROCEDURE — 80053 COMPREHEN METABOLIC PANEL: CPT

## 2020-02-26 PROCEDURE — 84443 ASSAY THYROID STIM HORMONE: CPT

## 2020-02-26 PROCEDURE — 80061 LIPID PANEL: CPT

## 2020-02-26 PROCEDURE — 85025 COMPLETE CBC W/AUTO DIFF WBC: CPT

## 2020-02-26 PROCEDURE — 36415 COLL VENOUS BLD VENIPUNCTURE: CPT | Mod: PN

## 2020-02-26 PROCEDURE — 83036 HEMOGLOBIN GLYCOSYLATED A1C: CPT

## 2020-02-27 ENCOUNTER — PATIENT MESSAGE (OUTPATIENT)
Dept: FAMILY MEDICINE | Facility: CLINIC | Age: 45
End: 2020-02-27

## 2020-02-28 ENCOUNTER — OFFICE VISIT (OUTPATIENT)
Dept: FAMILY MEDICINE | Facility: CLINIC | Age: 45
End: 2020-02-28
Payer: COMMERCIAL

## 2020-02-28 VITALS
HEART RATE: 92 BPM | BODY MASS INDEX: 32.82 KG/M2 | DIASTOLIC BLOOD PRESSURE: 78 MMHG | WEIGHT: 192.25 LBS | SYSTOLIC BLOOD PRESSURE: 110 MMHG | HEIGHT: 64 IN

## 2020-02-28 DIAGNOSIS — R79.89 ELEVATED LFTS: ICD-10-CM

## 2020-02-28 DIAGNOSIS — E11.8 TYPE 2 DIABETES WITH COMPLICATION: Primary | ICD-10-CM

## 2020-02-28 DIAGNOSIS — E11.9 TYPE 2 DIABETES MELLITUS WITHOUT COMPLICATION: ICD-10-CM

## 2020-02-28 PROCEDURE — 90686 IIV4 VACC NO PRSV 0.5 ML IM: CPT | Mod: S$GLB,,, | Performed by: NURSE PRACTITIONER

## 2020-02-28 PROCEDURE — 3008F PR BODY MASS INDEX (BMI) DOCUMENTED: ICD-10-PCS | Mod: CPTII,S$GLB,, | Performed by: NURSE PRACTITIONER

## 2020-02-28 PROCEDURE — 3078F DIAST BP <80 MM HG: CPT | Mod: CPTII,S$GLB,, | Performed by: NURSE PRACTITIONER

## 2020-02-28 PROCEDURE — 90471 IMMUNIZATION ADMIN: CPT | Mod: S$GLB,,, | Performed by: NURSE PRACTITIONER

## 2020-02-28 PROCEDURE — 90686 FLU VACCINE (QUAD) GREATER THAN OR EQUAL TO 3YO PRESERVATIVE FREE IM: ICD-10-PCS | Mod: S$GLB,,, | Performed by: NURSE PRACTITIONER

## 2020-02-28 PROCEDURE — 3051F PR MOST RECENT HEMOGLOBIN A1C LEVEL 7.0 - < 8.0%: ICD-10-PCS | Mod: CPTII,S$GLB,, | Performed by: NURSE PRACTITIONER

## 2020-02-28 PROCEDURE — 3074F PR MOST RECENT SYSTOLIC BLOOD PRESSURE < 130 MM HG: ICD-10-PCS | Mod: CPTII,S$GLB,, | Performed by: NURSE PRACTITIONER

## 2020-02-28 PROCEDURE — 3078F PR MOST RECENT DIASTOLIC BLOOD PRESSURE < 80 MM HG: ICD-10-PCS | Mod: CPTII,S$GLB,, | Performed by: NURSE PRACTITIONER

## 2020-02-28 PROCEDURE — 3008F BODY MASS INDEX DOCD: CPT | Mod: CPTII,S$GLB,, | Performed by: NURSE PRACTITIONER

## 2020-02-28 PROCEDURE — 90471 FLU VACCINE (QUAD) GREATER THAN OR EQUAL TO 3YO PRESERVATIVE FREE IM: ICD-10-PCS | Mod: S$GLB,,, | Performed by: NURSE PRACTITIONER

## 2020-02-28 PROCEDURE — 99214 PR OFFICE/OUTPT VISIT, EST, LEVL IV, 30-39 MIN: ICD-10-PCS | Mod: 25,S$GLB,, | Performed by: NURSE PRACTITIONER

## 2020-02-28 PROCEDURE — 3074F SYST BP LT 130 MM HG: CPT | Mod: CPTII,S$GLB,, | Performed by: NURSE PRACTITIONER

## 2020-02-28 PROCEDURE — 3051F HG A1C>EQUAL 7.0%<8.0%: CPT | Mod: CPTII,S$GLB,, | Performed by: NURSE PRACTITIONER

## 2020-02-28 PROCEDURE — 99214 OFFICE O/P EST MOD 30 MIN: CPT | Mod: 25,S$GLB,, | Performed by: NURSE PRACTITIONER

## 2020-02-28 RX ORDER — METFORMIN HYDROCHLORIDE 500 MG/1
500 TABLET ORAL
Qty: 90 TABLET | Refills: 3 | Status: SHIPPED | OUTPATIENT
Start: 2020-02-28 | End: 2021-04-19

## 2020-02-28 RX ORDER — EPINEPHRINE 0.3 MG/.3ML
1 INJECTION SUBCUTANEOUS ONCE
Qty: 2 DEVICE | Refills: 0 | Status: SHIPPED | OUTPATIENT
Start: 2020-02-28 | End: 2023-05-15

## 2020-02-28 RX ORDER — INSULIN PUMP SYRINGE, 3 ML
EACH MISCELLANEOUS
Qty: 1 EACH | Refills: 0 | Status: SHIPPED | OUTPATIENT
Start: 2020-02-28

## 2020-02-28 NOTE — PROGRESS NOTES
Subjective:       Patient ID: Ana Dorantes is a 44 y.o. female.    Chief Complaint: Follow-up and Flu Vaccine    The patient is here to review her recent blood work.  Her labs showed a hemoglobin A1c of 7.3.  This is a new diagnosis of DM for her.  She tells me she was very upset yesterday when she found this out.  She does feel like she has not been watching which she is eating he or exercising over the last year because she has had a lot of stressors in her life.    Lab Results       Component                Value               Date                       HGBA1C                   7.3 (H)             02/26/2020                We reviewed all of her blood work today.  She is in agreement to start 1 metformin daily and follow up with me in 3 months.  I did tell her she lost weight which could potentially get off of the metformin and she is very happy to hear this.    We have given her glucometer today and discuss this as well as we will set her up for diabetic education.    Review of Systems   Constitutional: Negative for activity change and unexpected weight change.   HENT: Positive for rhinorrhea. Negative for hearing loss and trouble swallowing.    Eyes: Negative for discharge and visual disturbance.   Respiratory: Negative for chest tightness and wheezing.    Cardiovascular: Negative for chest pain and palpitations.   Gastrointestinal: Negative for blood in stool, constipation, diarrhea and vomiting.   Endocrine: Negative for polydipsia and polyuria.   Genitourinary: Negative for difficulty urinating, dysuria, hematuria and menstrual problem.   Musculoskeletal: Negative for arthralgias, joint swelling and neck pain.   Neurological: Negative for weakness and headaches.   Psychiatric/Behavioral: Negative for confusion and dysphoric mood.       Past medical, surgical, family and social history reviewed.  Objective:     Vitals:    02/28/20 0955   BP: 110/78   Pulse: 92   Weight: 87.2 kg (192 lb 3.9 oz)  "  Height: 5' 4" (1.626 m)   PainSc: 0-No pain     Body mass index is 33 kg/m².     Physical Exam   Constitutional: She is oriented to person, place, and time. She appears well-developed. No distress.   Obese female    HENT:   Head: Normocephalic and atraumatic.   Right Ear: Hearing, tympanic membrane, external ear and ear canal normal.   Left Ear: Hearing, tympanic membrane, external ear and ear canal normal.   Nose: Nose normal.   Mouth/Throat: Uvula is midline, oropharynx is clear and moist and mucous membranes are normal.   Eyes: Pupils are equal, round, and reactive to light. Conjunctivae and EOM are normal. Right eye exhibits no discharge. Left eye exhibits no discharge.   Neck: Trachea normal and normal range of motion. Neck supple. No JVD present. Carotid bruit is not present. No thyromegaly present.   Cardiovascular: Normal rate and regular rhythm. Exam reveals no gallop and no friction rub.   No murmur heard.  Pulmonary/Chest: Effort normal and breath sounds normal. No respiratory distress. She has no wheezes. She has no rales. She exhibits no tenderness.   Abdominal: Soft. Bowel sounds are normal. She exhibits no distension and no mass. There is no tenderness. There is no rebound and no guarding.   Musculoskeletal: Normal range of motion.   Neurological: She is alert and oriented to person, place, and time. Coordination normal.   Skin: Skin is warm and dry. She is not diaphoretic.   Psychiatric: She has a normal mood and affect. Her behavior is normal. Judgment and thought content normal.       Assessment:       1. Type 2 diabetes with complication    2. Elevated LFTs        Plan:       Ana was seen today for follow-up and flu vaccine.    Diagnoses and all orders for this visit:    Type 2 diabetes with complication  -     Hemoglobin A1c; Future  -     Microalbumin/creatinine urine ratio; Future  -     metFORMIN (GLUCOPHAGE) 500 MG tablet; Take 1 tablet (500 mg total) by mouth daily with breakfast.  -    "  blood-glucose meter (BLOOD GLUCOSE MONITORING) kit; Use as instructed  -     blood sugar diagnostic Strp; 1 each by Misc.(Non-Drug; Combo Route) route once daily.  -     Ambulatory referral/consult to Diabetes Education; Future    Elevated LFTs  -     Comprehensive metabolic panel; Future    Other orders  -     EPINEPHrine (EPIPEN) 0.3 mg/0.3 mL AtIn; Inject 0.3 mLs (0.3 mg total) into the muscle once. for 1 dose  -     Influenza - Quadrivalent (PF)     I did tell the patient that we have more to discuss regarding diabetes such as starting a statin, etc  we will follow up in 3 months and review all of this.

## 2020-03-11 ENCOUNTER — TELEPHONE (OUTPATIENT)
Dept: FAMILY MEDICINE | Facility: CLINIC | Age: 45
End: 2020-03-11

## 2020-03-11 ENCOUNTER — PATIENT MESSAGE (OUTPATIENT)
Dept: FAMILY MEDICINE | Facility: CLINIC | Age: 45
End: 2020-03-11

## 2020-03-11 DIAGNOSIS — E11.8 TYPE 2 DIABETES WITH COMPLICATION: ICD-10-CM

## 2020-05-05 ENCOUNTER — PATIENT MESSAGE (OUTPATIENT)
Dept: ADMINISTRATIVE | Facility: HOSPITAL | Age: 45
End: 2020-05-05

## 2020-05-20 ENCOUNTER — PATIENT OUTREACH (OUTPATIENT)
Dept: ADMINISTRATIVE | Facility: HOSPITAL | Age: 45
End: 2020-05-20

## 2020-05-20 NOTE — PROGRESS NOTES
Chart review completed 05/20/2020.  Care Everywhere updates requested and reviewed.  Immunizations reconciled. Media reviewed. Portal message sent.    DIS, [facility], and [facility] reviewed; pt not found in any of these.      Health Maintenance Due   Topic Date Due    Eye Exam  06/09/1984    Urine Microalbumin  06/09/1984    HIV Screening  06/09/1989    Low Dose Statin  06/09/1995    Foot Exam  03/16/2018    Mammogram  12/31/2019

## 2020-05-22 RX ORDER — TRIAMTERENE/HYDROCHLOROTHIAZID 37.5-25 MG
TABLET ORAL
Qty: 90 TABLET | Refills: 0 | Status: SHIPPED | OUTPATIENT
Start: 2020-05-22 | End: 2020-08-21

## 2020-06-04 ENCOUNTER — PATIENT OUTREACH (OUTPATIENT)
Dept: ADMINISTRATIVE | Facility: HOSPITAL | Age: 45
End: 2020-06-04

## 2020-06-04 DIAGNOSIS — Z12.31 ENCOUNTER FOR SCREENING MAMMOGRAM FOR MALIGNANT NEOPLASM OF BREAST: Primary | ICD-10-CM

## 2020-06-04 NOTE — LETTER
June 12, 2020    Ana Zimmermanrro  [address] [place] [address]             [facility]  [address]  [place] [address]  Phone: [phone] [facility] your [facility].  To help you get the most out of each of your visits, we will review your information to make sure you are up to date on all of your recommended tests and/or procedures.       Gina Barrios MD  has found that your chart shows you may be due for the following:     Eye Exam   Urine test for your kidneys (Urine Microalbumin)   Foot Exam   Mammogram     If you have had any of the above done at another facility, please bring the records with you or fax them to [phone] so that your record at [facility] will be complete. If you have not had any of these tests or procedures done recently and would like to complete this testing ,  please call [phone] or send a message through your [facility] portal to your provider's office.     If you have an upcoming scheduled appointment for the above test and/or procedures, please disregard this letter.     If you are currently taking medication, please bring it with you to your appointment for review.     Sincerely,     Your [facility] CareMaria Ines Peter   Clinical Care Coordinator   [facility]

## 2020-06-04 NOTE — PROGRESS NOTES
Chart review completed 06/04/2020.  Care Everywhere updates requested and reviewed.  Immunizations reconciled. Media reviewed.     DIS, [facility], and [facility] reviewed    Mammogram not in DIS 06/04/2020    Test needed Not in [facility] or [facility] 06/04/2020

## 2020-06-05 ENCOUNTER — LAB VISIT (OUTPATIENT)
Dept: LAB | Facility: HOSPITAL | Age: 45
End: 2020-06-05
Attending: TRANSPLANT SURGERY
Payer: COMMERCIAL

## 2020-06-05 DIAGNOSIS — R79.89 ELEVATED LFTS: ICD-10-CM

## 2020-06-05 DIAGNOSIS — E11.8 TYPE 2 DIABETES WITH COMPLICATION: ICD-10-CM

## 2020-06-05 LAB
ALBUMIN SERPL BCP-MCNC: 3.3 G/DL (ref 3.5–5.2)
ALP SERPL-CCNC: 74 U/L (ref 55–135)
ALT SERPL W/O P-5'-P-CCNC: 38 U/L (ref 10–44)
ANION GAP SERPL CALC-SCNC: 9 MMOL/L (ref 8–16)
AST SERPL-CCNC: 36 U/L (ref 10–40)
BILIRUB SERPL-MCNC: 0.5 MG/DL (ref 0.1–1)
BUN SERPL-MCNC: 9 MG/DL (ref 6–20)
CALCIUM SERPL-MCNC: 8.9 MG/DL (ref 8.7–10.5)
CHLORIDE SERPL-SCNC: 104 MMOL/L (ref 95–110)
CO2 SERPL-SCNC: 24 MMOL/L (ref 23–29)
CREAT SERPL-MCNC: 0.7 MG/DL (ref 0.5–1.4)
EST. GFR  (AFRICAN AMERICAN): >60 ML/MIN/1.73 M^2
EST. GFR  (NON AFRICAN AMERICAN): >60 ML/MIN/1.73 M^2
GLUCOSE SERPL-MCNC: 93 MG/DL (ref 70–110)
POTASSIUM SERPL-SCNC: 3.5 MMOL/L (ref 3.5–5.1)
PROT SERPL-MCNC: 7.9 G/DL (ref 6–8.4)
SODIUM SERPL-SCNC: 137 MMOL/L (ref 136–145)

## 2020-06-05 PROCEDURE — 83036 HEMOGLOBIN GLYCOSYLATED A1C: CPT

## 2020-06-05 PROCEDURE — 80053 COMPREHEN METABOLIC PANEL: CPT

## 2020-06-05 PROCEDURE — 36415 COLL VENOUS BLD VENIPUNCTURE: CPT | Mod: PN

## 2020-06-06 LAB
ESTIMATED AVG GLUCOSE: 137 MG/DL (ref 68–131)
HBA1C MFR BLD HPLC: 6.4 % (ref 4–5.6)

## 2020-06-12 ENCOUNTER — OFFICE VISIT (OUTPATIENT)
Dept: FAMILY MEDICINE | Facility: CLINIC | Age: 45
End: 2020-06-12
Payer: COMMERCIAL

## 2020-06-12 VITALS
TEMPERATURE: 98 F | HEIGHT: 64 IN | HEART RATE: 72 BPM | RESPIRATION RATE: 20 BRPM | WEIGHT: 182.44 LBS | OXYGEN SATURATION: 97 % | BODY MASS INDEX: 31.15 KG/M2 | DIASTOLIC BLOOD PRESSURE: 82 MMHG | SYSTOLIC BLOOD PRESSURE: 138 MMHG

## 2020-06-12 DIAGNOSIS — E11.9 TYPE 2 DIABETES MELLITUS WITHOUT COMPLICATION, WITHOUT LONG-TERM CURRENT USE OF INSULIN: Primary | ICD-10-CM

## 2020-06-12 DIAGNOSIS — Z12.39 BREAST CANCER SCREENING: ICD-10-CM

## 2020-06-12 PROCEDURE — 99214 OFFICE O/P EST MOD 30 MIN: CPT | Mod: S$GLB,,, | Performed by: NURSE PRACTITIONER

## 2020-06-12 PROCEDURE — 3079F DIAST BP 80-89 MM HG: CPT | Mod: CPTII,S$GLB,, | Performed by: NURSE PRACTITIONER

## 2020-06-12 PROCEDURE — 3008F PR BODY MASS INDEX (BMI) DOCUMENTED: ICD-10-PCS | Mod: CPTII,S$GLB,, | Performed by: NURSE PRACTITIONER

## 2020-06-12 PROCEDURE — 3044F HG A1C LEVEL LT 7.0%: CPT | Mod: CPTII,S$GLB,, | Performed by: NURSE PRACTITIONER

## 2020-06-12 PROCEDURE — 3075F SYST BP GE 130 - 139MM HG: CPT | Mod: CPTII,S$GLB,, | Performed by: NURSE PRACTITIONER

## 2020-06-12 PROCEDURE — 3044F PR MOST RECENT HEMOGLOBIN A1C LEVEL <7.0%: ICD-10-PCS | Mod: CPTII,S$GLB,, | Performed by: NURSE PRACTITIONER

## 2020-06-12 PROCEDURE — 82043 UR ALBUMIN QUANTITATIVE: CPT

## 2020-06-12 PROCEDURE — 3075F PR MOST RECENT SYSTOLIC BLOOD PRESS GE 130-139MM HG: ICD-10-PCS | Mod: CPTII,S$GLB,, | Performed by: NURSE PRACTITIONER

## 2020-06-12 PROCEDURE — 3079F PR MOST RECENT DIASTOLIC BLOOD PRESSURE 80-89 MM HG: ICD-10-PCS | Mod: CPTII,S$GLB,, | Performed by: NURSE PRACTITIONER

## 2020-06-12 PROCEDURE — 99214 PR OFFICE/OUTPT VISIT, EST, LEVL IV, 30-39 MIN: ICD-10-PCS | Mod: S$GLB,,, | Performed by: NURSE PRACTITIONER

## 2020-06-12 PROCEDURE — 3008F BODY MASS INDEX DOCD: CPT | Mod: CPTII,S$GLB,, | Performed by: NURSE PRACTITIONER

## 2020-06-12 NOTE — PROGRESS NOTES
"Subjective:       Patient ID: Ana Dorantes is a 45 y.o. female.    Chief Complaint: Follow-up    HPI  Review of Systems    Past medical, surgical, family and social history reviewed.  Objective:     Vitals:    06/12/20 1523   BP: 138/82   Pulse: 72   Resp: 20   Temp: 98.3 °F (36.8 °C)   TempSrc: Oral   SpO2: 97%   Weight: 82.7 kg (182 lb 6.9 oz)   Height: 5' 4" (1.626 m)   PainSc: 0-No pain     Body mass index is 31.31 kg/m².     Physical Exam   Cardiovascular:   Pulses:       Dorsalis pedis pulses are 2+ on the right side, and 2+ on the left side.        Posterior tibial pulses are 2+ on the right side, and 2+ on the left side.   Feet:   Right Foot:   Protective Sensation: 5 sites tested. 5 sites sensed.   Skin Integrity: Negative for ulcer, blister, skin breakdown, erythema, warmth, callus or dry skin.   Left Foot:   Protective Sensation: 5 sites tested. 5 sites sensed.   Skin Integrity: Negative for ulcer, blister, skin breakdown, erythema, warmth, callus or dry skin.       Assessment:       No diagnosis found.    Plan:       There are no diagnoses linked to this encounter.        "

## 2020-06-12 NOTE — PROGRESS NOTES
"Subjective:       Patient ID: Ana Dorantes is a 45 y.o. female.    Chief Complaint: Follow-up    The patient is here for a follow-up diabetes visit.  We put her on metformin 500 mg daily a few months ago and she has lost 10 lb.  She is feeling well today without any new complaints.  Her hemoglobin A1c has come down nicely from 7.3-6.4.  She is tolerating the metformin well without any side effects.  Lab Results       Component                Value               Date                       HGBA1C                   6.4 (H)             06/05/2020                Review of Systems   Constitutional: Negative for activity change and appetite change.   HENT: Negative for congestion, postnasal drip, rhinorrhea and sinus pressure.    Eyes: Negative for pain and redness.   Respiratory: Negative for choking and chest tightness.    Gastrointestinal: Negative for abdominal distention, abdominal pain, blood in stool, constipation, diarrhea, nausea and vomiting.   Endocrine: Negative for polydipsia and polyphagia.   Genitourinary: Negative for dysuria and hematuria.   Musculoskeletal: Negative for arthralgias and myalgias.   Skin: Negative for color change and rash.   Neurological: Negative for dizziness and headaches.   Psychiatric/Behavioral: Negative for agitation and behavioral problems.       Past medical, surgical, family and social history reviewed.  Objective:     Vitals:    06/12/20 1523   BP: 138/82   Pulse: 72   Resp: 20   Temp: 98.3 °F (36.8 °C)   TempSrc: Oral   SpO2: 97%   Weight: 82.7 kg (182 lb 6.9 oz)   Height: 5' 4" (1.626 m)   PainSc: 0-No pain     Body mass index is 31.31 kg/m².     Physical Exam  Constitutional:       General: She is not in acute distress.     Appearance: She is well-developed. She is not diaphoretic.   HENT:      Head: Normocephalic and atraumatic.      Right Ear: Hearing, tympanic membrane, ear canal and external ear normal.      Left Ear: Hearing, tympanic membrane, ear canal and " external ear normal.      Nose: Nose normal.      Mouth/Throat:      Pharynx: Uvula midline.   Eyes:      General:         Right eye: No discharge.         Left eye: No discharge.      Conjunctiva/sclera: Conjunctivae normal.      Pupils: Pupils are equal, round, and reactive to light.   Neck:      Musculoskeletal: Normal range of motion and neck supple.      Thyroid: No thyromegaly.      Vascular: No carotid bruit or JVD.      Trachea: Trachea normal.   Cardiovascular:      Rate and Rhythm: Normal rate and regular rhythm.      Pulses:           Dorsalis pedis pulses are 2+ on the right side and 2+ on the left side.        Posterior tibial pulses are 2+ on the right side and 2+ on the left side.      Heart sounds: No murmur. No friction rub. No gallop.    Pulmonary:      Effort: Pulmonary effort is normal. No respiratory distress.      Breath sounds: Normal breath sounds. No wheezing or rales.   Chest:      Chest wall: No tenderness.   Abdominal:      General: Bowel sounds are normal. There is no distension.      Palpations: Abdomen is soft. There is no mass.      Tenderness: There is no abdominal tenderness. There is no guarding or rebound.   Musculoskeletal: Normal range of motion.   Feet:      Right foot:      Protective Sensation: 5 sites tested. 5 sites sensed.      Skin integrity: No ulcer, blister, skin breakdown, erythema, warmth, callus or dry skin.      Left foot:      Protective Sensation: 5 sites tested. 5 sites sensed.      Skin integrity: No ulcer, blister, skin breakdown, erythema, warmth, callus or dry skin.   Skin:     General: Skin is warm and dry.   Neurological:      Mental Status: She is alert and oriented to person, place, and time.      Coordination: Coordination normal.   Psychiatric:         Behavior: Behavior normal.         Thought Content: Thought content normal.         Judgment: Judgment normal.         Assessment:       1. Type 2 diabetes mellitus without complication, without  long-term current use of insulin    2. Breast cancer screening        Plan:       Ana was seen today for follow-up.    Diagnoses and all orders for this visit:    Type 2 diabetes mellitus without complication, without long-term current use of insulin  -     Hemoglobin A1C; Future  -     Microalbumin/creatinine urine ratio  We had a long discussion about a statin and I did tell the patient we would further discuss this at our next visit.    The patient would like to get off the metformin.  We have agreed to have her take a half a tablet daily in the morning (250 mg)   We will recheck hemoglobin A1c in 3 months.    Breast cancer screening  -     Mammo Digital Screening Bilat; Future

## 2020-06-15 LAB
ALBUMIN/CREAT UR: 9.3 UG/MG (ref 0–30)
CREAT UR-MCNC: 43 MG/DL (ref 15–325)
MICROALBUMIN UR DL<=1MG/L-MCNC: 4 UG/ML

## 2020-08-31 ENCOUNTER — PATIENT OUTREACH (OUTPATIENT)
Dept: ADMINISTRATIVE | Facility: HOSPITAL | Age: 45
End: 2020-08-31

## 2020-08-31 NOTE — PROGRESS NOTES
Chart review completed 2020.  Care Everywhere updates requested and reviewed.  Immunizations reconciled. Media reports reviewed.  Duplicate HM overrides and  orders removed.  Overdue HM topic chart audit and/or requested.  Overdue lab testing linked to upcoming lab appointments if applies.      Portal message sent       Health Maintenance Due   Topic Date Due    Hepatitis C Screening  1975    Eye Exam  1984    HIV Screening  1989    Low Dose Statin  1995    Mammogram  2019

## 2020-09-04 ENCOUNTER — PATIENT OUTREACH (OUTPATIENT)
Dept: ADMINISTRATIVE | Facility: HOSPITAL | Age: 45
End: 2020-09-04

## 2020-09-04 NOTE — PROGRESS NOTES
Health Maintenance Due   Topic Date Due    Hepatitis C Screening  1975    Eye Exam  1984    HIV Screening  1989    Low Dose Statin  1995    Mammogram  2019    Influenza Vaccine (1) 2020       Chart review completed 2020.  Care Everywhere updates requested and reviewed.  Immunizations reconciled. Media reports reviewed.  Duplicate HM overrides and  orders removed.  Overdue HM topic chart audit and/or requested.  Overdue lab testing linked to upcoming lab appointments if applies.

## 2020-09-12 ENCOUNTER — HOSPITAL ENCOUNTER (OUTPATIENT)
Dept: RADIOLOGY | Facility: HOSPITAL | Age: 45
Discharge: HOME | End: 2020-09-12
Attending: NURSE PRACTITIONER
Payer: COMMERCIAL

## 2020-09-12 DIAGNOSIS — Z12.31 BREAST CANCER SCREENING BY MAMMOGRAM: ICD-10-CM

## 2020-09-12 DIAGNOSIS — Z12.39 BREAST CANCER SCREENING: ICD-10-CM

## 2020-09-12 PROCEDURE — 77067 SCR MAMMO BI INCL CAD: CPT | Mod: TC,PO

## 2020-09-12 PROCEDURE — 77067 MAMMO DIGITAL SCREENING BILAT WITH TOMOSYNTHESIS_CAD: ICD-10-PCS | Mod: 26,,, | Performed by: RADIOLOGY

## 2020-09-12 PROCEDURE — 77067 SCR MAMMO BI INCL CAD: CPT | Mod: 26,,, | Performed by: RADIOLOGY

## 2020-09-12 PROCEDURE — 77063 BREAST TOMOSYNTHESIS BI: CPT | Mod: 26,,, | Performed by: RADIOLOGY

## 2020-09-12 PROCEDURE — 77063 MAMMO DIGITAL SCREENING BILAT WITH TOMOSYNTHESIS_CAD: ICD-10-PCS | Mod: 26,,, | Performed by: RADIOLOGY

## 2020-09-16 ENCOUNTER — TELEPHONE (OUTPATIENT)
Dept: FAMILY MEDICINE | Facility: CLINIC | Age: 45
End: 2020-09-16

## 2020-09-16 DIAGNOSIS — R92.8 ABNORMAL MAMMOGRAM: Primary | ICD-10-CM

## 2020-09-16 NOTE — TELEPHONE ENCOUNTER
Her recent mammogram showed some areas that they wanted to look at with a diagnostic mammogram and possible ultrasound.  Please schedule.

## 2020-09-16 NOTE — TELEPHONE ENCOUNTER
Please assist with scheduling mammo/US, no availability sooner than 10/16. Patient is aware of results and need for further imaging.

## 2020-09-17 ENCOUNTER — TELEPHONE (OUTPATIENT)
Dept: RADIOLOGY | Facility: HOSPITAL | Age: 45
End: 2020-09-17

## 2020-09-25 ENCOUNTER — HOSPITAL ENCOUNTER (OUTPATIENT)
Dept: RADIOLOGY | Facility: HOSPITAL | Age: 45
Discharge: HOME | End: 2020-09-25
Attending: NURSE PRACTITIONER
Payer: COMMERCIAL

## 2020-09-25 DIAGNOSIS — R92.8 ABNORMAL MAMMOGRAM: ICD-10-CM

## 2020-09-25 PROCEDURE — 77066 MAMMO DIGITAL DIAGNOSTIC BILAT WITH TOMO: ICD-10-PCS | Mod: 26,,, | Performed by: RADIOLOGY

## 2020-09-25 PROCEDURE — 76642 US BREAST RIGHT LIMITED: ICD-10-PCS | Mod: 26,RT,, | Performed by: RADIOLOGY

## 2020-09-25 PROCEDURE — 77066 DX MAMMO INCL CAD BI: CPT | Mod: 26,,, | Performed by: RADIOLOGY

## 2020-09-25 PROCEDURE — 76642 ULTRASOUND BREAST LIMITED: CPT | Mod: TC,PO,RT

## 2020-09-25 PROCEDURE — 77062 MAMMO DIGITAL DIAGNOSTIC BILAT WITH TOMO: ICD-10-PCS | Mod: 26,,, | Performed by: RADIOLOGY

## 2020-09-25 PROCEDURE — 76642 ULTRASOUND BREAST LIMITED: CPT | Mod: 26,RT,, | Performed by: RADIOLOGY

## 2020-09-25 PROCEDURE — 77062 BREAST TOMOSYNTHESIS BI: CPT | Mod: TC,PO

## 2020-09-25 PROCEDURE — 77062 BREAST TOMOSYNTHESIS BI: CPT | Mod: 26,,, | Performed by: RADIOLOGY

## 2020-10-14 ENCOUNTER — PATIENT MESSAGE (OUTPATIENT)
Dept: FAMILY MEDICINE | Facility: CLINIC | Age: 45
End: 2020-10-14

## 2020-10-20 ENCOUNTER — PATIENT MESSAGE (OUTPATIENT)
Dept: FAMILY MEDICINE | Facility: CLINIC | Age: 45
End: 2020-10-20

## 2020-10-20 NOTE — TELEPHONE ENCOUNTER
The only time you are typically screened for your blood type is prior to a major surgery.  We currently do not have a record of this.  The insurance company does not typically cover us to just find out your blood type.  If you have ever donated blood the blood center would have record of this.    Hope this helps.    Alise Ortega, [facility]  [address]  [place] [address]  [phone] (phone)  [phone] (fax)

## 2020-10-24 ENCOUNTER — LAB VISIT (OUTPATIENT)
Dept: LAB | Facility: HOSPITAL | Age: 45
End: 2020-10-24
Attending: NURSE PRACTITIONER
Payer: COMMERCIAL

## 2020-10-24 ENCOUNTER — IMMUNIZATION (OUTPATIENT)
Dept: FAMILY MEDICINE | Facility: CLINIC | Age: 45
End: 2020-10-24
Payer: COMMERCIAL

## 2020-10-24 DIAGNOSIS — E11.9 TYPE 2 DIABETES MELLITUS WITHOUT COMPLICATION, WITHOUT LONG-TERM CURRENT USE OF INSULIN: ICD-10-CM

## 2020-10-24 LAB
ESTIMATED AVG GLUCOSE: 146 MG/DL (ref 68–131)
HBA1C MFR BLD HPLC: 6.7 % (ref 4–5.6)

## 2020-10-24 PROCEDURE — 83036 HEMOGLOBIN GLYCOSYLATED A1C: CPT

## 2020-10-24 PROCEDURE — 90471 FLU VACCINE (QUAD) GREATER THAN OR EQUAL TO 3YO PRESERVATIVE FREE IM: ICD-10-PCS | Mod: S$GLB,,, | Performed by: INTERNAL MEDICINE

## 2020-10-24 PROCEDURE — 90686 IIV4 VACC NO PRSV 0.5 ML IM: CPT | Mod: S$GLB,,, | Performed by: INTERNAL MEDICINE

## 2020-10-24 PROCEDURE — 90686 FLU VACCINE (QUAD) GREATER THAN OR EQUAL TO 3YO PRESERVATIVE FREE IM: ICD-10-PCS | Mod: S$GLB,,, | Performed by: INTERNAL MEDICINE

## 2020-10-24 PROCEDURE — 36415 COLL VENOUS BLD VENIPUNCTURE: CPT | Mod: PO

## 2020-10-24 PROCEDURE — 90471 IMMUNIZATION ADMIN: CPT | Mod: S$GLB,,, | Performed by: INTERNAL MEDICINE

## 2020-11-06 ENCOUNTER — OFFICE VISIT (OUTPATIENT)
Dept: FAMILY MEDICINE | Facility: CLINIC | Age: 45
End: 2020-11-06
Payer: COMMERCIAL

## 2020-11-06 DIAGNOSIS — I10 HYPERTENSION, UNSPECIFIED TYPE: ICD-10-CM

## 2020-11-06 DIAGNOSIS — E11.9 TYPE 2 DIABETES MELLITUS WITHOUT COMPLICATION, WITHOUT LONG-TERM CURRENT USE OF INSULIN: Primary | ICD-10-CM

## 2020-11-06 DIAGNOSIS — Z79.899 ENCOUNTER FOR LONG-TERM CURRENT USE OF MEDICATION: ICD-10-CM

## 2020-11-06 DIAGNOSIS — E11.8 TYPE 2 DIABETES WITH COMPLICATION: ICD-10-CM

## 2020-11-06 DIAGNOSIS — Z11.59 NEED FOR HEPATITIS C SCREENING TEST: ICD-10-CM

## 2020-11-06 DIAGNOSIS — Z11.4 ENCOUNTER FOR SCREENING FOR HIV: ICD-10-CM

## 2020-11-06 DIAGNOSIS — R79.89 ELEVATED LFTS: ICD-10-CM

## 2020-11-06 PROCEDURE — 99213 OFFICE O/P EST LOW 20 MIN: CPT | Mod: 95,,, | Performed by: NURSE PRACTITIONER

## 2020-11-06 PROCEDURE — 3044F PR MOST RECENT HEMOGLOBIN A1C LEVEL <7.0%: ICD-10-PCS | Mod: CPTII,,, | Performed by: NURSE PRACTITIONER

## 2020-11-06 PROCEDURE — 99213 PR OFFICE/OUTPT VISIT, EST, LEVL III, 20-29 MIN: ICD-10-PCS | Mod: 95,,, | Performed by: NURSE PRACTITIONER

## 2020-11-06 PROCEDURE — 3044F HG A1C LEVEL LT 7.0%: CPT | Mod: CPTII,,, | Performed by: NURSE PRACTITIONER

## 2020-11-06 NOTE — Clinical Note
Please book with diabetic educator-patient would like to do VV if possible  Please schedule labs in 4 months

## 2020-11-08 NOTE — PROGRESS NOTES
The patient location is: car   The chief complaint leading to consultation is: follow up   Visit type: Virtual visit with synchronous audio and video  Total time spent with patient: 15 mins   Each patient to whom he or she provides medical services by telemedicine is:  (1) informed of the relationship between the physician and patient and the respective role of any other health care provider with respect to management of the patient; and (2) notified that he or she may decline to receive medical services by telemedicine and may withdraw from such care at any time.    HPI: the patient is here for a ollow up visit.  Patient has diabetes, on metformin.   Lab Results   Component Value Date    HGBA1C 6.7 (H) 10/24/2020    She does feel like her diet and exercise have been suffering secondary to the COVID pandemic.  She is interested in seeing the diabetic educator.  She denies any new complaints today.  She does have hypertension on triamterene/HCTZ that has been well controlled.     Review of Systems   Constitutional: Negative for activity change and appetite change.   HENT: Negative for congestion, postnasal drip, rhinorrhea and sinus pressure.    Eyes: Negative for pain and redness.   Respiratory: Negative for choking and chest tightness.    Gastrointestinal: Negative for abdominal distention, abdominal pain, blood in stool, constipation, diarrhea, nausea and vomiting.   Endocrine: Negative for polydipsia and polyphagia.   Genitourinary: Negative for dysuria and hematuria.   Musculoskeletal: Negative for arthralgias and myalgias.   Skin: Negative for color change and rash.   Neurological: Negative for dizziness and headaches.   Psychiatric/Behavioral: Negative for agitation and behavioral problems.     Physical Exam   Constitutional: The patient is oriented to person, place, and time. He appears well-developed and well-nourished.   HENT: Head: Normocephalic and atraumatic.  Skin: Skin dry.   Psychiatric: Normal mood  and affect.    Assess/Plan:  Diagnoses and all orders for this visit:    Type 2 diabetes mellitus without complication, without long-term current use of insulin  -     Ambulatory referral/consult to Diabetes Education; Future  Continue metformin    Type 2 diabetes with complication  -     Hemoglobin A1C; Future  -     Lipid Panel; Future    Elevated LFTs  Check CMP    Encounter for screening for HIV  -     HIV 1/2 Ag/Ab (4th Gen); Future    Need for hepatitis C screening test  -     Hepatitis C Antibody; Future    Encounter for long-term current use of medication  -     CBC Auto Differential; Future  -     Comprehensive Metabolic Panel; Future  -     TSH; Future    Hypertension, unspecified type    Stable continue current medications    Answers for HPI/ROS submitted by the patient on 11/6/2020   Diabetes problem  MedicAlert ID: No  Disease duration: 6 months  blurred vision: No  chest pain: No  fatigue: No  foot paresthesias: No  foot ulcerations: No  polydipsia: No  polyphagia: No  polyuria: No  visual change: No  weakness: No  weight loss: No  Symptom course: improving  confusion: No  dizziness: No  headaches: No  hunger: No  mood changes: No  nervous/anxious: No  pallor: No  seizures: No  sleepiness: No  speech difficulty: No  sweats: No  tremors: No  blackouts: No  hospitalization: No  nocturnal hypoglycemia: No  required assistance: No  required glucagon: No  CVA: No  heart disease: No  impotence: No  nephropathy: No  peripheral neuropathy: No  PVD: No  retinopathy: No  autonomic neuropathy: No  CAD risks: hypertension, obesity  Current treatments: diet, oral agent (monotherapy)  Treatment compliance: most of the time  Dose schedule: pre-breakfast  Given by: patient  Monitoring compliance: adequate  Weight trend: fluctuating dramatically  Current diet: generally healthy  Meal planning: avoidance of concentrated sweets  Exercise: three times a week  Dietitian visit: No  Eye exam current: Yes  Sees podiatrist:  No

## 2020-11-09 ENCOUNTER — PATIENT OUTREACH (OUTPATIENT)
Dept: ADMINISTRATIVE | Facility: HOSPITAL | Age: 45
End: 2020-11-09

## 2020-11-09 NOTE — LETTER
AUTHORIZATION FOR RELEASE OF   CONFIDENTIAL INFORMATION    Dear [facility],    We are seeing Ana Dorantes, date of birth 1975, in the clinic at [facility]. Gina Barrios MD is the patient's PCP. Ana Dorantes has an outstanding lab/procedure at the time we reviewed her chart. In order to help keep her health information updated, she has authorized us to request the following medical record(s):                                                 (X)  EYE EXAM                         Please fax records to [facility], Richelle Schiro, MD, [phone]       If you have any questions, please contact Rachael Steve MA Care Coordinator   at [phone].                             Patient Name: Ana Dorantes  : 1975  Patient Phone #: [phone]

## 2020-11-11 ENCOUNTER — TELEPHONE (OUTPATIENT)
Dept: FAMILY MEDICINE | Facility: CLINIC | Age: 45
End: 2020-11-11

## 2020-11-16 ENCOUNTER — PATIENT OUTREACH (OUTPATIENT)
Dept: ADMINISTRATIVE | Facility: HOSPITAL | Age: 45
End: 2020-11-16

## 2021-03-02 RX ORDER — TRIAMTERENE/HYDROCHLOROTHIAZID 37.5-25 MG
TABLET ORAL
Qty: 90 TABLET | Refills: 3 | Status: SHIPPED | OUTPATIENT
Start: 2021-03-02 | End: 2022-03-24

## 2021-03-10 ENCOUNTER — IMMUNIZATION (OUTPATIENT)
Dept: PHARMACY | Facility: CLINIC | Age: 46
End: 2021-03-10
Payer: COMMERCIAL

## 2021-03-10 DIAGNOSIS — Z23 NEED FOR VACCINATION: Primary | ICD-10-CM

## 2021-04-06 ENCOUNTER — PATIENT MESSAGE (OUTPATIENT)
Dept: FAMILY MEDICINE | Facility: CLINIC | Age: 46
End: 2021-04-06

## 2021-04-06 ENCOUNTER — PATIENT MESSAGE (OUTPATIENT)
Dept: ADMINISTRATIVE | Facility: HOSPITAL | Age: 46
End: 2021-04-06

## 2021-04-07 ENCOUNTER — IMMUNIZATION (OUTPATIENT)
Dept: PHARMACY | Facility: CLINIC | Age: 46
End: 2021-04-07

## 2021-04-07 DIAGNOSIS — Z23 NEED FOR VACCINATION: Primary | ICD-10-CM

## 2021-04-14 ENCOUNTER — LAB VISIT (OUTPATIENT)
Dept: LAB | Facility: HOSPITAL | Age: 46
End: 2021-04-14
Attending: NURSE PRACTITIONER
Payer: COMMERCIAL

## 2021-04-14 DIAGNOSIS — E11.8 TYPE 2 DIABETES WITH COMPLICATION: ICD-10-CM

## 2021-04-14 DIAGNOSIS — Z79.899 ENCOUNTER FOR LONG-TERM CURRENT USE OF MEDICATION: ICD-10-CM

## 2021-04-14 DIAGNOSIS — Z11.59 NEED FOR HEPATITIS C SCREENING TEST: ICD-10-CM

## 2021-04-14 DIAGNOSIS — Z11.4 ENCOUNTER FOR SCREENING FOR HIV: ICD-10-CM

## 2021-04-14 LAB
ALBUMIN SERPL BCP-MCNC: 3.2 G/DL (ref 3.5–5.2)
ALP SERPL-CCNC: 67 U/L (ref 55–135)
ALT SERPL W/O P-5'-P-CCNC: 54 U/L (ref 10–44)
ANION GAP SERPL CALC-SCNC: 9 MMOL/L (ref 8–16)
ANISOCYTOSIS BLD QL SMEAR: SLIGHT
AST SERPL-CCNC: 42 U/L (ref 10–40)
BASOPHILS # BLD AUTO: 0.03 K/UL (ref 0–0.2)
BASOPHILS NFR BLD: 0.5 % (ref 0–1.9)
BILIRUB SERPL-MCNC: 0.3 MG/DL (ref 0.1–1)
BUN SERPL-MCNC: 9 MG/DL (ref 6–20)
CALCIUM SERPL-MCNC: 8.4 MG/DL (ref 8.7–10.5)
CHLORIDE SERPL-SCNC: 106 MMOL/L (ref 95–110)
CHOLEST SERPL-MCNC: 151 MG/DL (ref 120–199)
CHOLEST/HDLC SERPL: 3.4 {RATIO} (ref 2–5)
CO2 SERPL-SCNC: 23 MMOL/L (ref 23–29)
CREAT SERPL-MCNC: 0.7 MG/DL (ref 0.5–1.4)
DIFFERENTIAL METHOD: ABNORMAL
EOSINOPHIL # BLD AUTO: 0.2 K/UL (ref 0–0.5)
EOSINOPHIL NFR BLD: 2.7 % (ref 0–8)
ERYTHROCYTE [DISTWIDTH] IN BLOOD BY AUTOMATED COUNT: 14.6 % (ref 11.5–14.5)
EST. GFR  (AFRICAN AMERICAN): >60 ML/MIN/1.73 M^2
EST. GFR  (NON AFRICAN AMERICAN): >60 ML/MIN/1.73 M^2
ESTIMATED AVG GLUCOSE: 134 MG/DL (ref 68–131)
GLUCOSE SERPL-MCNC: 117 MG/DL (ref 70–110)
HBA1C MFR BLD: 6.3 % (ref 4–5.6)
HCT VFR BLD AUTO: 38.3 % (ref 37–48.5)
HDLC SERPL-MCNC: 44 MG/DL (ref 40–75)
HDLC SERPL: 29.1 % (ref 20–50)
HGB BLD-MCNC: 12.2 G/DL (ref 12–16)
IMM GRANULOCYTES # BLD AUTO: 0.02 K/UL (ref 0–0.04)
IMM GRANULOCYTES NFR BLD AUTO: 0.4 % (ref 0–0.5)
LDLC SERPL CALC-MCNC: 84.2 MG/DL (ref 63–159)
LYMPHOCYTES # BLD AUTO: 2.2 K/UL (ref 1–4.8)
LYMPHOCYTES NFR BLD: 38.5 % (ref 18–48)
MCH RBC QN AUTO: 29 PG (ref 27–31)
MCHC RBC AUTO-ENTMCNC: 31.9 G/DL (ref 32–36)
MCV RBC AUTO: 91 FL (ref 82–98)
MONOCYTES # BLD AUTO: 0.5 K/UL (ref 0.3–1)
MONOCYTES NFR BLD: 9.1 % (ref 4–15)
NEUTROPHILS # BLD AUTO: 2.8 K/UL (ref 1.8–7.7)
NEUTROPHILS NFR BLD: 48.8 % (ref 38–73)
NONHDLC SERPL-MCNC: 107 MG/DL
NRBC BLD-RTO: 0 /100 WBC
PLATELET # BLD AUTO: 288 K/UL (ref 150–450)
PLATELET BLD QL SMEAR: ABNORMAL
PMV BLD AUTO: 11.1 FL (ref 9.2–12.9)
POTASSIUM SERPL-SCNC: 3.9 MMOL/L (ref 3.5–5.1)
PROT SERPL-MCNC: 7.6 G/DL (ref 6–8.4)
RBC # BLD AUTO: 4.2 M/UL (ref 4–5.4)
SODIUM SERPL-SCNC: 138 MMOL/L (ref 136–145)
SPHEROCYTES BLD QL SMEAR: ABNORMAL
TRIGL SERPL-MCNC: 114 MG/DL (ref 30–150)
TSH SERPL DL<=0.005 MIU/L-ACNC: 1.83 UIU/ML (ref 0.4–4)
WBC # BLD AUTO: 5.63 K/UL (ref 3.9–12.7)

## 2021-04-14 PROCEDURE — 86703 HIV-1/HIV-2 1 RESULT ANTBDY: CPT | Performed by: NURSE PRACTITIONER

## 2021-04-14 PROCEDURE — 84443 ASSAY THYROID STIM HORMONE: CPT | Performed by: NURSE PRACTITIONER

## 2021-04-14 PROCEDURE — 80061 LIPID PANEL: CPT | Performed by: NURSE PRACTITIONER

## 2021-04-14 PROCEDURE — 83036 HEMOGLOBIN GLYCOSYLATED A1C: CPT | Performed by: NURSE PRACTITIONER

## 2021-04-14 PROCEDURE — 85025 COMPLETE CBC W/AUTO DIFF WBC: CPT | Performed by: NURSE PRACTITIONER

## 2021-04-14 PROCEDURE — 86803 HEPATITIS C AB TEST: CPT | Performed by: NURSE PRACTITIONER

## 2021-04-14 PROCEDURE — 36415 COLL VENOUS BLD VENIPUNCTURE: CPT | Mod: PN | Performed by: NURSE PRACTITIONER

## 2021-04-14 PROCEDURE — 80053 COMPREHEN METABOLIC PANEL: CPT | Performed by: NURSE PRACTITIONER

## 2021-04-15 LAB
HCV AB SERPL QL IA: NEGATIVE
HIV 1+2 AB+HIV1 P24 AG SERPL QL IA: NEGATIVE

## 2021-04-19 DIAGNOSIS — E11.8 TYPE 2 DIABETES WITH COMPLICATION: ICD-10-CM

## 2021-04-19 RX ORDER — METFORMIN HYDROCHLORIDE 500 MG/1
TABLET ORAL
Qty: 90 TABLET | Refills: 3 | Status: SHIPPED | OUTPATIENT
Start: 2021-04-19 | End: 2022-06-24

## 2021-09-14 DIAGNOSIS — E11.9 TYPE 2 DIABETES MELLITUS WITHOUT COMPLICATION: ICD-10-CM

## 2021-11-18 ENCOUNTER — IMMUNIZATION (OUTPATIENT)
Dept: PRIMARY CARE CLINIC | Facility: CLINIC | Age: 46
End: 2021-11-18
Payer: COMMERCIAL

## 2021-11-18 DIAGNOSIS — Z23 NEED FOR VACCINATION: Primary | ICD-10-CM

## 2021-11-18 PROCEDURE — 0064A COVID-19, MRNA, LNP-S, PF, 100 MCG/0.25 ML DOSE VACCINE (MODERNA BOOSTER): CPT | Mod: CV19,PBBFAC | Performed by: INTERNAL MEDICINE

## 2021-12-30 ENCOUNTER — LAB VISIT (OUTPATIENT)
Dept: PRIMARY CARE CLINIC | Facility: OTHER | Age: 46
End: 2021-12-30
Attending: INTERNAL MEDICINE
Payer: COMMERCIAL

## 2021-12-30 ENCOUNTER — PATIENT MESSAGE (OUTPATIENT)
Dept: ADMINISTRATIVE | Facility: OTHER | Age: 46
End: 2021-12-30
Payer: COMMERCIAL

## 2021-12-30 DIAGNOSIS — Z20.822 ENCOUNTER FOR LABORATORY TESTING FOR COVID-19 VIRUS: ICD-10-CM

## 2021-12-30 PROCEDURE — U0003 INFECTIOUS AGENT DETECTION BY NUCLEIC ACID (DNA OR RNA); SEVERE ACUTE RESPIRATORY SYNDROME CORONAVIRUS 2 (SARS-COV-2) (CORONAVIRUS DISEASE [COVID-19]), AMPLIFIED PROBE TECHNIQUE, MAKING USE OF HIGH THROUGHPUT TECHNOLOGIES AS DESCRIBED BY CMS-2020-01-R: HCPCS | Performed by: INTERNAL MEDICINE

## 2022-01-01 LAB
SARS-COV-2 RNA RESP QL NAA+PROBE: NOT DETECTED
SARS-COV-2- CYCLE NUMBER: NORMAL

## 2022-01-13 ENCOUNTER — PATIENT MESSAGE (OUTPATIENT)
Dept: FAMILY MEDICINE | Facility: CLINIC | Age: 47
End: 2022-01-13
Payer: COMMERCIAL

## 2022-01-13 DIAGNOSIS — Z12.39 ENCOUNTER FOR SCREENING FOR MALIGNANT NEOPLASM OF BREAST, UNSPECIFIED SCREENING MODALITY: Primary | ICD-10-CM

## 2022-01-13 DIAGNOSIS — Z00.00 LABORATORY EXAM ORDERED AS PART OF ROUTINE GENERAL MEDICAL EXAMINATION: ICD-10-CM

## 2022-01-18 ENCOUNTER — PATIENT MESSAGE (OUTPATIENT)
Dept: FAMILY MEDICINE | Facility: CLINIC | Age: 47
End: 2022-01-18
Payer: COMMERCIAL

## 2022-01-19 ENCOUNTER — LAB VISIT (OUTPATIENT)
Dept: PRIMARY CARE CLINIC | Facility: OTHER | Age: 47
End: 2022-01-19
Payer: COMMERCIAL

## 2022-01-19 ENCOUNTER — LAB VISIT (OUTPATIENT)
Dept: LAB | Facility: HOSPITAL | Age: 47
End: 2022-01-19
Attending: PATHOLOGY
Payer: COMMERCIAL

## 2022-01-19 ENCOUNTER — TELEPHONE (OUTPATIENT)
Dept: FAMILY MEDICINE | Facility: CLINIC | Age: 47
End: 2022-01-19
Payer: COMMERCIAL

## 2022-01-19 DIAGNOSIS — Z00.00 LABORATORY EXAM ORDERED AS PART OF ROUTINE GENERAL MEDICAL EXAMINATION: ICD-10-CM

## 2022-01-19 DIAGNOSIS — R05.9 COUGH: ICD-10-CM

## 2022-01-19 DIAGNOSIS — R05.9 COUGH: Primary | ICD-10-CM

## 2022-01-19 LAB
ALBUMIN SERPL BCP-MCNC: 3 G/DL (ref 3.5–5.2)
ALP SERPL-CCNC: 94 U/L (ref 55–135)
ALT SERPL W/O P-5'-P-CCNC: 55 U/L (ref 10–44)
ANION GAP SERPL CALC-SCNC: 13 MMOL/L (ref 8–16)
AST SERPL-CCNC: 75 U/L (ref 10–40)
BASOPHILS # BLD AUTO: 0.04 K/UL (ref 0–0.2)
BASOPHILS NFR BLD: 0.6 % (ref 0–1.9)
BILIRUB SERPL-MCNC: 0.5 MG/DL (ref 0.1–1)
BUN SERPL-MCNC: 11 MG/DL (ref 6–20)
CALCIUM SERPL-MCNC: 9.2 MG/DL (ref 8.7–10.5)
CHLORIDE SERPL-SCNC: 100 MMOL/L (ref 95–110)
CHOLEST SERPL-MCNC: 146 MG/DL (ref 120–199)
CHOLEST/HDLC SERPL: 3.1 {RATIO} (ref 2–5)
CO2 SERPL-SCNC: 21 MMOL/L (ref 23–29)
CREAT SERPL-MCNC: 0.7 MG/DL (ref 0.5–1.4)
DIFFERENTIAL METHOD: NORMAL
EOSINOPHIL # BLD AUTO: 0.1 K/UL (ref 0–0.5)
EOSINOPHIL NFR BLD: 2 % (ref 0–8)
ERYTHROCYTE [DISTWIDTH] IN BLOOD BY AUTOMATED COUNT: 13.9 % (ref 11.5–14.5)
EST. GFR  (AFRICAN AMERICAN): >60 ML/MIN/1.73 M^2
EST. GFR  (NON AFRICAN AMERICAN): >60 ML/MIN/1.73 M^2
ESTIMATED AVG GLUCOSE: 174 MG/DL (ref 68–131)
GLUCOSE SERPL-MCNC: 175 MG/DL (ref 70–110)
HBA1C MFR BLD: 7.7 % (ref 4–5.6)
HCT VFR BLD AUTO: 38.2 % (ref 37–48.5)
HDLC SERPL-MCNC: 47 MG/DL (ref 40–75)
HDLC SERPL: 32.2 % (ref 20–50)
HGB BLD-MCNC: 12.4 G/DL (ref 12–16)
IMM GRANULOCYTES # BLD AUTO: 0.03 K/UL (ref 0–0.04)
IMM GRANULOCYTES NFR BLD AUTO: 0.4 % (ref 0–0.5)
LDLC SERPL CALC-MCNC: 70.4 MG/DL (ref 63–159)
LYMPHOCYTES # BLD AUTO: 1.8 K/UL (ref 1–4.8)
LYMPHOCYTES NFR BLD: 26.1 % (ref 18–48)
MCH RBC QN AUTO: 29.7 PG (ref 27–31)
MCHC RBC AUTO-ENTMCNC: 32.5 G/DL (ref 32–36)
MCV RBC AUTO: 91 FL (ref 82–98)
MONOCYTES # BLD AUTO: 0.5 K/UL (ref 0.3–1)
MONOCYTES NFR BLD: 6.9 % (ref 4–15)
NEUTROPHILS # BLD AUTO: 4.4 K/UL (ref 1.8–7.7)
NEUTROPHILS NFR BLD: 64 % (ref 38–73)
NONHDLC SERPL-MCNC: 99 MG/DL
NRBC BLD-RTO: 0 /100 WBC
PLATELET # BLD AUTO: 289 K/UL (ref 150–450)
PMV BLD AUTO: 10.6 FL (ref 9.2–12.9)
POTASSIUM SERPL-SCNC: 4 MMOL/L (ref 3.5–5.1)
PROT SERPL-MCNC: 7.6 G/DL (ref 6–8.4)
RBC # BLD AUTO: 4.18 M/UL (ref 4–5.4)
SODIUM SERPL-SCNC: 134 MMOL/L (ref 136–145)
TRIGL SERPL-MCNC: 143 MG/DL (ref 30–150)
TSH SERPL DL<=0.005 MIU/L-ACNC: 2.08 UIU/ML (ref 0.4–4)
WBC # BLD AUTO: 6.94 K/UL (ref 3.9–12.7)

## 2022-01-19 PROCEDURE — U0003 INFECTIOUS AGENT DETECTION BY NUCLEIC ACID (DNA OR RNA); SEVERE ACUTE RESPIRATORY SYNDROME CORONAVIRUS 2 (SARS-COV-2) (CORONAVIRUS DISEASE [COVID-19]), AMPLIFIED PROBE TECHNIQUE, MAKING USE OF HIGH THROUGHPUT TECHNOLOGIES AS DESCRIBED BY CMS-2020-01-R: HCPCS | Performed by: NURSE PRACTITIONER

## 2022-01-19 PROCEDURE — 85025 COMPLETE CBC W/AUTO DIFF WBC: CPT | Performed by: NURSE PRACTITIONER

## 2022-01-19 PROCEDURE — 36415 COLL VENOUS BLD VENIPUNCTURE: CPT | Mod: PO | Performed by: NURSE PRACTITIONER

## 2022-01-19 PROCEDURE — 80061 LIPID PANEL: CPT | Performed by: NURSE PRACTITIONER

## 2022-01-19 PROCEDURE — 84443 ASSAY THYROID STIM HORMONE: CPT | Performed by: NURSE PRACTITIONER

## 2022-01-19 PROCEDURE — 80053 COMPREHEN METABOLIC PANEL: CPT | Performed by: NURSE PRACTITIONER

## 2022-01-19 PROCEDURE — 83036 HEMOGLOBIN GLYCOSYLATED A1C: CPT | Performed by: NURSE PRACTITIONER

## 2022-01-20 LAB
SARS-COV-2 RNA RESP QL NAA+PROBE: NOT DETECTED
SARS-COV-2- CYCLE NUMBER: NORMAL

## 2022-01-25 ENCOUNTER — PATIENT MESSAGE (OUTPATIENT)
Dept: FAMILY MEDICINE | Facility: CLINIC | Age: 47
End: 2022-01-25
Payer: COMMERCIAL

## 2022-01-29 ENCOUNTER — PATIENT MESSAGE (OUTPATIENT)
Dept: ADMINISTRATIVE | Facility: HOSPITAL | Age: 47
End: 2022-01-29
Payer: COMMERCIAL

## 2022-01-29 ENCOUNTER — PATIENT OUTREACH (OUTPATIENT)
Dept: ADMINISTRATIVE | Facility: HOSPITAL | Age: 47
End: 2022-01-29
Payer: COMMERCIAL

## 2022-01-29 DIAGNOSIS — Z12.11 SCREENING FOR COLON CANCER: Primary | ICD-10-CM

## 2022-01-29 NOTE — LETTER
AUTHORIZATION FOR RELEASE OF   CONFIDENTIAL INFORMATION    Dear [facility],    We are seeing Ana Dorantes, date of birth 1975, in the clinic at [facility]. Radha Ortega NP is the patient's PCP. Ana Dorantes has an outstanding lab/procedure at the time we reviewed her chart. In order to help keep her health information updated, she has authorized us to request the following medical record(s):                                                (X)  EYE EXAM                     Please fax records to [facility], Elizabeth T Buras, NP, [phone]     If you have any questions, please contact Rachael Steve MA Care Coordinator  at [phone].               Patient Name: Ana Dorantes  : 1975  Patient Phone #: [phone]

## 2022-01-29 NOTE — PROGRESS NOTES
2022 Care Everywhere updates requested and reviewed.  Immunizations reconciled. Media reports reviewed.  Duplicate HM overrides and  orders removed.  Overdue HM topic chart audit and/or requested.  Overdue lab testing linked to upcoming lab appointments if applies.        Eye Exam requested    WOG orders placed. *    Portal message sent      Health Maintenance Due   Topic Date Due    Low Dose Statin  Never done    Colorectal Cancer Screening  Never done    Foot Exam  2021    Eye Exam  2021    Mammogram  2021

## 2022-02-01 ENCOUNTER — HOSPITAL ENCOUNTER (OUTPATIENT)
Dept: RADIOLOGY | Facility: HOSPITAL | Age: 47
Discharge: HOME | End: 2022-02-01
Attending: NURSE PRACTITIONER
Payer: COMMERCIAL

## 2022-02-01 DIAGNOSIS — Z12.39 ENCOUNTER FOR SCREENING FOR MALIGNANT NEOPLASM OF BREAST, UNSPECIFIED SCREENING MODALITY: ICD-10-CM

## 2022-02-01 DIAGNOSIS — Z12.31 BREAST CANCER SCREENING BY MAMMOGRAM: ICD-10-CM

## 2022-02-01 LAB
LEFT EYE DM RETINOPATHY: NEGATIVE
RIGHT EYE DM RETINOPATHY: NEGATIVE

## 2022-02-01 PROCEDURE — 77067 SCR MAMMO BI INCL CAD: CPT | Mod: 26,,, | Performed by: RADIOLOGY

## 2022-02-01 PROCEDURE — 77063 MAMMO DIGITAL SCREENING BILAT WITH TOMO: ICD-10-PCS | Mod: 26,,, | Performed by: RADIOLOGY

## 2022-02-01 PROCEDURE — 77063 BREAST TOMOSYNTHESIS BI: CPT | Mod: 26,,, | Performed by: RADIOLOGY

## 2022-02-01 PROCEDURE — 77067 MAMMO DIGITAL SCREENING BILAT WITH TOMO: ICD-10-PCS | Mod: 26,,, | Performed by: RADIOLOGY

## 2022-02-01 PROCEDURE — 77063 BREAST TOMOSYNTHESIS BI: CPT | Mod: TC,PO

## 2022-02-02 ENCOUNTER — OFFICE VISIT (OUTPATIENT)
Dept: FAMILY MEDICINE | Facility: CLINIC | Age: 47
End: 2022-02-02
Payer: COMMERCIAL

## 2022-02-02 VITALS
SYSTOLIC BLOOD PRESSURE: 116 MMHG | BODY MASS INDEX: 33.1 KG/M2 | WEIGHT: 193.88 LBS | RESPIRATION RATE: 18 BRPM | HEIGHT: 64 IN | DIASTOLIC BLOOD PRESSURE: 82 MMHG | HEART RATE: 86 BPM | TEMPERATURE: 99 F

## 2022-02-02 DIAGNOSIS — I10 HYPERTENSION, UNSPECIFIED TYPE: ICD-10-CM

## 2022-02-02 DIAGNOSIS — E11.9 TYPE 2 DIABETES MELLITUS WITHOUT COMPLICATION, WITHOUT LONG-TERM CURRENT USE OF INSULIN: Primary | ICD-10-CM

## 2022-02-02 DIAGNOSIS — E66.9 OBESITY, UNSPECIFIED CLASSIFICATION, UNSPECIFIED OBESITY TYPE, UNSPECIFIED WHETHER SERIOUS COMORBIDITY PRESENT: ICD-10-CM

## 2022-02-02 PROCEDURE — 99214 OFFICE O/P EST MOD 30 MIN: CPT | Mod: S$GLB,,, | Performed by: NURSE PRACTITIONER

## 2022-02-02 PROCEDURE — 3051F HG A1C>EQUAL 7.0%<8.0%: CPT | Mod: CPTII,S$GLB,, | Performed by: NURSE PRACTITIONER

## 2022-02-02 PROCEDURE — 3074F PR MOST RECENT SYSTOLIC BLOOD PRESSURE < 130 MM HG: ICD-10-PCS | Mod: CPTII,S$GLB,, | Performed by: NURSE PRACTITIONER

## 2022-02-02 PROCEDURE — 3008F PR BODY MASS INDEX (BMI) DOCUMENTED: ICD-10-PCS | Mod: CPTII,S$GLB,, | Performed by: NURSE PRACTITIONER

## 2022-02-02 PROCEDURE — 3008F BODY MASS INDEX DOCD: CPT | Mod: CPTII,S$GLB,, | Performed by: NURSE PRACTITIONER

## 2022-02-02 PROCEDURE — 3066F NEPHROPATHY DOC TX: CPT | Mod: CPTII,S$GLB,, | Performed by: NURSE PRACTITIONER

## 2022-02-02 PROCEDURE — 3066F PR DOCUMENTATION OF TREATMENT FOR NEPHROPATHY: ICD-10-PCS | Mod: CPTII,S$GLB,, | Performed by: NURSE PRACTITIONER

## 2022-02-02 PROCEDURE — 3061F PR NEG MICROALBUMINURIA RESULT DOCUMENTED/REVIEW: ICD-10-PCS | Mod: CPTII,S$GLB,, | Performed by: NURSE PRACTITIONER

## 2022-02-02 PROCEDURE — 3074F SYST BP LT 130 MM HG: CPT | Mod: CPTII,S$GLB,, | Performed by: NURSE PRACTITIONER

## 2022-02-02 PROCEDURE — 3079F PR MOST RECENT DIASTOLIC BLOOD PRESSURE 80-89 MM HG: ICD-10-PCS | Mod: CPTII,S$GLB,, | Performed by: NURSE PRACTITIONER

## 2022-02-02 PROCEDURE — 99214 PR OFFICE/OUTPT VISIT, EST, LEVL IV, 30-39 MIN: ICD-10-PCS | Mod: S$GLB,,, | Performed by: NURSE PRACTITIONER

## 2022-02-02 PROCEDURE — 1159F PR MEDICATION LIST DOCUMENTED IN MEDICAL RECORD: ICD-10-PCS | Mod: CPTII,S$GLB,, | Performed by: NURSE PRACTITIONER

## 2022-02-02 PROCEDURE — 3051F PR MOST RECENT HEMOGLOBIN A1C LEVEL 7.0 - < 8.0%: ICD-10-PCS | Mod: CPTII,S$GLB,, | Performed by: NURSE PRACTITIONER

## 2022-02-02 PROCEDURE — 1159F MED LIST DOCD IN RCRD: CPT | Mod: CPTII,S$GLB,, | Performed by: NURSE PRACTITIONER

## 2022-02-02 PROCEDURE — 3079F DIAST BP 80-89 MM HG: CPT | Mod: CPTII,S$GLB,, | Performed by: NURSE PRACTITIONER

## 2022-02-02 PROCEDURE — 3061F NEG MICROALBUMINURIA REV: CPT | Mod: CPTII,S$GLB,, | Performed by: NURSE PRACTITIONER

## 2022-02-02 RX ORDER — SEMAGLUTIDE 1.34 MG/ML
0.5 INJECTION, SOLUTION SUBCUTANEOUS
Qty: 1.5 ML | Refills: 12 | Status: SHIPPED | OUTPATIENT
Start: 2022-02-02 | End: 2022-09-08

## 2022-02-03 ENCOUNTER — PATIENT OUTREACH (OUTPATIENT)
Dept: ADMINISTRATIVE | Facility: HOSPITAL | Age: 47
End: 2022-02-03
Payer: COMMERCIAL

## 2022-02-03 ENCOUNTER — PATIENT MESSAGE (OUTPATIENT)
Dept: FAMILY MEDICINE | Facility: CLINIC | Age: 47
End: 2022-02-03
Payer: COMMERCIAL

## 2022-02-18 ENCOUNTER — HOSPITAL ENCOUNTER (OUTPATIENT)
Dept: RADIOLOGY | Facility: HOSPITAL | Age: 47
Discharge: HOME | End: 2022-02-18
Attending: NURSE PRACTITIONER
Payer: COMMERCIAL

## 2022-02-18 DIAGNOSIS — R92.8 ABNORMAL MAMMOGRAM: ICD-10-CM

## 2022-02-18 PROCEDURE — 77065 MAMMO DIGITAL DIAGNOSTIC LEFT WITH TOMO: ICD-10-PCS | Mod: 26,LT,, | Performed by: RADIOLOGY

## 2022-02-18 PROCEDURE — 76642 ULTRASOUND BREAST LIMITED: CPT | Mod: 26,LT,, | Performed by: RADIOLOGY

## 2022-02-18 PROCEDURE — 76642 ULTRASOUND BREAST LIMITED: CPT | Mod: TC,PO,LT

## 2022-02-18 PROCEDURE — 77061 BREAST TOMOSYNTHESIS UNI: CPT | Mod: 26,LT,, | Performed by: RADIOLOGY

## 2022-02-18 PROCEDURE — 77065 DX MAMMO INCL CAD UNI: CPT | Mod: 26,LT,, | Performed by: RADIOLOGY

## 2022-02-18 PROCEDURE — 77061 MAMMO DIGITAL DIAGNOSTIC LEFT WITH TOMO: ICD-10-PCS | Mod: 26,LT,, | Performed by: RADIOLOGY

## 2022-02-18 PROCEDURE — 77065 DX MAMMO INCL CAD UNI: CPT | Mod: TC,PO,LT

## 2022-02-18 PROCEDURE — 76642 US BREAST LEFT LIMITED: ICD-10-PCS | Mod: 26,LT,, | Performed by: RADIOLOGY

## 2022-02-21 ENCOUNTER — PATIENT MESSAGE (OUTPATIENT)
Dept: FAMILY MEDICINE | Facility: CLINIC | Age: 47
End: 2022-02-21
Payer: COMMERCIAL

## 2022-03-04 ENCOUNTER — PATIENT OUTREACH (OUTPATIENT)
Dept: ADMINISTRATIVE | Facility: HOSPITAL | Age: 47
End: 2022-03-04
Payer: COMMERCIAL

## 2022-03-04 NOTE — PROGRESS NOTES
2022 Care Everywhere updates requested and reviewed.  Immunizations reconciled. Media reports reviewed.  Duplicate HM overrides and  orders removed.  Overdue HM topic chart audit and/or requested.  Overdue lab testing linked to upcoming lab appointments if applies.             Health Maintenance Due   Topic Date Due    Low Dose Statin  Never done    Colorectal Cancer Screening  Never done    COVID-19 Vaccine (3 - Moderna risk 4-dose series) 2021

## 2022-03-06 ENCOUNTER — PATIENT MESSAGE (OUTPATIENT)
Dept: FAMILY MEDICINE | Facility: CLINIC | Age: 47
End: 2022-03-06
Payer: COMMERCIAL

## 2022-03-07 ENCOUNTER — PATIENT MESSAGE (OUTPATIENT)
Dept: FAMILY MEDICINE | Facility: CLINIC | Age: 47
End: 2022-03-07
Payer: COMMERCIAL

## 2022-03-07 ENCOUNTER — TELEPHONE (OUTPATIENT)
Dept: FAMILY MEDICINE | Facility: CLINIC | Age: 47
End: 2022-03-07
Payer: COMMERCIAL

## 2022-03-09 ENCOUNTER — PATIENT OUTREACH (OUTPATIENT)
Dept: ADMINISTRATIVE | Facility: HOSPITAL | Age: 47
End: 2022-03-09
Payer: COMMERCIAL

## 2022-03-24 ENCOUNTER — OFFICE VISIT (OUTPATIENT)
Dept: FAMILY MEDICINE | Facility: CLINIC | Age: 47
End: 2022-03-24
Payer: COMMERCIAL

## 2022-03-24 ENCOUNTER — TELEPHONE (OUTPATIENT)
Dept: GASTROENTEROLOGY | Facility: CLINIC | Age: 47
End: 2022-03-24
Payer: COMMERCIAL

## 2022-03-24 VITALS
HEIGHT: 64 IN | BODY MASS INDEX: 31.86 KG/M2 | WEIGHT: 186.63 LBS | TEMPERATURE: 96 F | RESPIRATION RATE: 20 BRPM | HEART RATE: 100 BPM | SYSTOLIC BLOOD PRESSURE: 126 MMHG | OXYGEN SATURATION: 100 % | DIASTOLIC BLOOD PRESSURE: 78 MMHG

## 2022-03-24 DIAGNOSIS — E11.59 HYPERTENSION ASSOCIATED WITH DIABETES: ICD-10-CM

## 2022-03-24 DIAGNOSIS — E11.8 DIABETES MELLITUS TYPE 2 WITH COMPLICATIONS: ICD-10-CM

## 2022-03-24 DIAGNOSIS — I15.2 HYPERTENSION ASSOCIATED WITH DIABETES: ICD-10-CM

## 2022-03-24 DIAGNOSIS — Z00.00 ANNUAL PHYSICAL EXAM: Primary | ICD-10-CM

## 2022-03-24 DIAGNOSIS — F41.9 ANXIETY: ICD-10-CM

## 2022-03-24 PROCEDURE — 99396 PREV VISIT EST AGE 40-64: CPT | Mod: S$GLB,,, | Performed by: FAMILY MEDICINE

## 2022-03-24 PROCEDURE — 99396 PR PREVENTIVE VISIT,EST,40-64: ICD-10-PCS | Mod: S$GLB,,, | Performed by: FAMILY MEDICINE

## 2022-03-24 RX ORDER — PNV NO.95/FERROUS FUM/FOLIC AC 28MG-0.8MG
100 TABLET ORAL DAILY
COMMUNITY
End: 2023-12-04

## 2022-03-24 RX ORDER — SPIRONOLACTONE 50 MG/1
50 TABLET, FILM COATED ORAL DAILY
COMMUNITY
End: 2023-12-04

## 2022-03-24 RX ORDER — CLONAZEPAM 0.5 MG/1
0.5 TABLET ORAL DAILY PRN
Qty: 15 TABLET | Refills: 0 | Status: SHIPPED | OUTPATIENT
Start: 2022-03-24 | End: 2023-02-15

## 2022-03-24 RX ORDER — FINASTERIDE 5 MG/1
5 TABLET, FILM COATED ORAL DAILY
COMMUNITY
Start: 2022-03-03 | End: 2022-03-26

## 2022-03-24 RX ORDER — IBUPROFEN 100 MG/5ML
1000 SUSPENSION, ORAL (FINAL DOSE FORM) ORAL DAILY
COMMUNITY
End: 2023-05-15

## 2022-03-26 NOTE — PROGRESS NOTES
Subjective:       Patient ID: Ana Dorantes is a 46 y.o. female.    Chief Complaint: Annual Exam    HPI   The patient is a 46-year-old who is here today for her annual exam.  I have not seen her in 3 years but she has been seeing the nurse practitioner in the clinic.  She is trying to be physically active.  She tries to walk 2 miles twice a week.  She is trying to eat healthy.  She is trying to lose weight.  She is interested in a colonoscopy for colon cancer screening.    Today we discussed the followin)  Hypertension.  Her blood pressure today is 126/78.  She is taking Maxzide  2) diabetes.  Her January A1c was 7.7 up from prior value of 6.3.  She is currently taking Ozempic (which was started last month) and Glucophage.  Her sugars have recently been in the  range.  She is involved in a diabetic coaching program ([employer]) through her [employer] insurance which has been very helpful.  3) hyperlipidemia.  She is not currently on cholesterol medicine.  Her January total cholesterol was 146 and her LDL was 70  4) elevated LFTs.  Her January LFTs remain elevated.  Her recent AST was 75 and her ALT was 55. She does have fatty liver disease.  She does recalls having a liver biopsy years ago      5) anxiety.  She has had a lot of stress.  She has been feeling up and down with her mom who is giving her a lot of anxiety.  Her mom is very forgetful and lives with her, her , her adult daughter and her adult son.  Her daughter suggested that she take some THC gummies for her anxiety which did help but she wonders if that is safe.  She feels mostly anxious but will have some depression which she is able to snap out of very quickly  6) hair loss.  She has been dealing with hair loss.  She is meeting with the dermatologist Dr. Rueda.  She is currently on finasteride but does not like that after reading more about it.  Dr. Rueda has suggested she try Aldactone but told her to check with me 1st.       Review of Systems   Constitutional: Negative for appetite change, chills, diaphoresis, fatigue, fever and unexpected weight change.   HENT: Negative for congestion, dental problem, ear pain, hearing loss, postnasal drip, rhinorrhea, sneezing, sore throat and trouble swallowing.    Eyes: Negative for photophobia, pain, discharge and visual disturbance.   Respiratory: Negative for cough, chest tightness, shortness of breath and wheezing.    Cardiovascular: Negative for chest pain, palpitations and leg swelling.   Gastrointestinal: Negative for abdominal distention, abdominal pain, blood in stool, constipation, diarrhea, nausea and vomiting.   Endocrine: Negative for cold intolerance, heat intolerance, polydipsia and polyuria.        +hair loss   Genitourinary: Negative for dysuria, flank pain, frequency, genital sores, hematuria, menstrual problem and vaginal discharge.   Musculoskeletal: Negative for arthralgias, joint swelling and myalgias.   Skin: Negative for rash.   Neurological: Negative for dizziness, syncope, light-headedness and headaches.   Hematological: Negative for adenopathy. Does not bruise/bleed easily.   Psychiatric/Behavioral: Negative for dysphoric mood, self-injury, sleep disturbance and suicidal ideas. The patient is nervous/anxious.        Objective:      Physical Exam  Constitutional:       General: She is not in acute distress.     Appearance: Normal appearance. She is well-developed.   HENT:      Head: Normocephalic and atraumatic.      Right Ear: Hearing, tympanic membrane, ear canal and external ear normal.      Left Ear: Hearing, tympanic membrane, ear canal and external ear normal.      Nose: Nose normal.      Mouth/Throat:      Mouth: No oral lesions.      Pharynx: No oropharyngeal exudate or posterior oropharyngeal erythema.   Eyes:      General: Lids are normal. No scleral icterus.     Extraocular Movements: Extraocular movements intact.      Conjunctiva/sclera: Conjunctivae normal.  "     Pupils: Pupils are equal, round, and reactive to light.   Neck:      Thyroid: No thyroid mass or thyromegaly.      Vascular: No carotid bruit.   Cardiovascular:      Rate and Rhythm: Normal rate and regular rhythm.  No extrasystoles are present.     Chest Wall: PMI is not displaced.      Heart sounds: Normal heart sounds. No murmur heard.    No gallop.   Pulmonary:      Effort: Pulmonary effort is normal. No accessory muscle usage or respiratory distress.      Breath sounds: Normal breath sounds.   Chest:   Breasts:      Right: No supraclavicular adenopathy.      Left: No supraclavicular adenopathy.       Abdominal:      General: Bowel sounds are normal. There is no abdominal bruit.      Palpations: Abdomen is soft.      Tenderness: There is no abdominal tenderness. There is no rebound.   Musculoskeletal:      Cervical back: Normal range of motion and neck supple.   Lymphadenopathy:      Head:      Right side of head: No submental or submandibular adenopathy.      Left side of head: No submental or submandibular adenopathy.      Cervical:      Right cervical: No superficial, deep or posterior cervical adenopathy.     Left cervical: No superficial, deep or posterior cervical adenopathy.      Upper Body:      Right upper body: No supraclavicular adenopathy.      Left upper body: No supraclavicular adenopathy.   Skin:     General: Skin is warm and dry.   Neurological:      Mental Status: She is alert and oriented to person, place, and time.      Cranial Nerves: No cranial nerve deficit.      Sensory: No sensory deficit.   Psychiatric:         Speech: Speech normal.         Behavior: Behavior normal.         Thought Content: Thought content normal.       Blood pressure 126/78, pulse 100, temperature 96 °F (35.6 °C), resp. rate 20, height 5' 4" (1.626 m), weight 84.6 kg (186 lb 9.9 oz), last menstrual period 03/06/2022, SpO2 100 %.Body mass index is 32.03 kg/m².          A/P:  1) annual exam.  Health maintenance " issues and anticipatory guidance issues were discussed.  She will try to increase her physical activity.  She will consider weight watchers for her diet.   We will do a colonoscopy for colon cancer screening.  Her mammogram is up-to-date  2)  Hypertension.  Well controlled.  Given the medicine she wants to start for the alopecia, we are going to stop the Maxzide and she will start Aldactone .  We will see how the Aldactone controls her blood pressure  3) diabetes.  Uncontrolled.  We will see how she does with the Ozempic and the Glucophage.  She will continue her efforts at diet exercise weight loss.  We will check an A1c again in 3 months .  We will consider having her meet with the diabetic educator although she is getting these services through the coaching program she is involved with with her insurance company.  We will consider statin    4) elevated LFTs due to fatty liver disease.  Persistent.  We will recheck LFTs in 3 months.  If these remain elevated, we will consider having her meet with the hepatology team for reassessment of her liver.    5) anxiety.  Intermittent.  I am going to refer her to counseling.  I did give her a prescription for Klonopin.  She understands that Klonopin is a benzodiazepine with the potential for addiction and tolerance.  The patient also understands that all benzos impair reflexes and cognition and so the patient should not drive, operate heavy machinery or make significant decisions while taking the benzodiazepine.  The patient should also not take the benzodiazepines with alcohol  6) alopecia.  Persistent.  We will stop the Maxzide so that she can start the Aldactone under the direction of the dermatologist Dr. Rueda      I will see her back in 3 months or sooner if needed

## 2022-03-31 ENCOUNTER — PATIENT MESSAGE (OUTPATIENT)
Dept: FAMILY MEDICINE | Facility: CLINIC | Age: 47
End: 2022-03-31
Payer: COMMERCIAL

## 2022-04-04 RX ORDER — TRIAMTERENE/HYDROCHLOROTHIAZID 37.5-25 MG
TABLET ORAL
Qty: 90 TABLET | Refills: 3 | Status: SHIPPED | OUTPATIENT
Start: 2022-04-04 | End: 2022-09-08

## 2022-04-07 ENCOUNTER — LAB VISIT (OUTPATIENT)
Dept: LAB | Facility: HOSPITAL | Age: 47
End: 2022-04-07
Attending: FAMILY MEDICINE
Payer: COMMERCIAL

## 2022-04-07 DIAGNOSIS — Z00.00 ANNUAL PHYSICAL EXAM: ICD-10-CM

## 2022-04-07 LAB
ANION GAP SERPL CALC-SCNC: 10 MMOL/L (ref 8–16)
BUN SERPL-MCNC: 12 MG/DL (ref 6–20)
CALCIUM SERPL-MCNC: 9.1 MG/DL (ref 8.7–10.5)
CHLORIDE SERPL-SCNC: 100 MMOL/L (ref 95–110)
CO2 SERPL-SCNC: 25 MMOL/L (ref 23–29)
CREAT SERPL-MCNC: 0.7 MG/DL (ref 0.5–1.4)
EST. GFR  (AFRICAN AMERICAN): >60 ML/MIN/1.73 M^2
EST. GFR  (NON AFRICAN AMERICAN): >60 ML/MIN/1.73 M^2
GLUCOSE SERPL-MCNC: 124 MG/DL (ref 70–110)
POTASSIUM SERPL-SCNC: 3.6 MMOL/L (ref 3.5–5.1)
SODIUM SERPL-SCNC: 135 MMOL/L (ref 136–145)

## 2022-04-07 PROCEDURE — 80048 BASIC METABOLIC PNL TOTAL CA: CPT | Performed by: FAMILY MEDICINE

## 2022-04-07 PROCEDURE — 36415 COLL VENOUS BLD VENIPUNCTURE: CPT | Mod: PN | Performed by: FAMILY MEDICINE

## 2022-04-12 ENCOUNTER — PATIENT MESSAGE (OUTPATIENT)
Dept: ADMINISTRATIVE | Facility: HOSPITAL | Age: 47
End: 2022-04-12
Payer: COMMERCIAL

## 2022-04-12 ENCOUNTER — PATIENT OUTREACH (OUTPATIENT)
Dept: ADMINISTRATIVE | Facility: HOSPITAL | Age: 47
End: 2022-04-12
Payer: COMMERCIAL

## 2022-04-16 ENCOUNTER — PATIENT MESSAGE (OUTPATIENT)
Dept: FAMILY MEDICINE | Facility: CLINIC | Age: 47
End: 2022-04-16
Payer: COMMERCIAL

## 2022-05-06 ENCOUNTER — PATIENT MESSAGE (OUTPATIENT)
Dept: FAMILY MEDICINE | Facility: CLINIC | Age: 47
End: 2022-05-06
Payer: COMMERCIAL

## 2022-05-19 ENCOUNTER — PATIENT OUTREACH (OUTPATIENT)
Dept: ADMINISTRATIVE | Facility: HOSPITAL | Age: 47
End: 2022-05-19
Payer: COMMERCIAL

## 2022-05-19 NOTE — PROGRESS NOTES
Non-compliant GAP report chart review - Chart review completed for the following HM test if overdue  (Mammogram, Colonoscopy, Cervical Cancer Screening,  Diabetic lab testing, and/or Dilated EYE EXAM)  05/19/2022    Care Everywhere and Media reports - updates requested and reviewed.        Colonoscopy and labs scheduled      Health Maintenance Due   Topic Date Due    Low Dose Statin  Never done    Colorectal Cancer Screening  Never done    COVID-19 Vaccine (3 - Moderna risk series) 12/16/2021

## 2022-05-24 ENCOUNTER — LAB VISIT (OUTPATIENT)
Dept: LAB | Facility: HOSPITAL | Age: 47
End: 2022-05-24
Attending: FAMILY MEDICINE
Payer: COMMERCIAL

## 2022-05-24 DIAGNOSIS — Z00.00 ANNUAL PHYSICAL EXAM: ICD-10-CM

## 2022-05-24 DIAGNOSIS — E11.8 DIABETES MELLITUS TYPE 2 WITH COMPLICATIONS: ICD-10-CM

## 2022-05-24 LAB
ALBUMIN SERPL BCP-MCNC: 3.1 G/DL (ref 3.5–5.2)
ALP SERPL-CCNC: 76 U/L (ref 55–135)
ALT SERPL W/O P-5'-P-CCNC: 34 U/L (ref 10–44)
AST SERPL-CCNC: 36 U/L (ref 10–40)
BILIRUB DIRECT SERPL-MCNC: 0.2 MG/DL (ref 0.1–0.3)
BILIRUB SERPL-MCNC: 0.4 MG/DL (ref 0.1–1)
ESTIMATED AVG GLUCOSE: 123 MG/DL (ref 68–131)
HBA1C MFR BLD: 5.9 % (ref 4–5.6)
PROT SERPL-MCNC: 7.6 G/DL (ref 6–8.4)

## 2022-05-24 PROCEDURE — 83036 HEMOGLOBIN GLYCOSYLATED A1C: CPT | Performed by: FAMILY MEDICINE

## 2022-05-24 PROCEDURE — 80076 HEPATIC FUNCTION PANEL: CPT | Performed by: FAMILY MEDICINE

## 2022-05-24 PROCEDURE — 36415 COLL VENOUS BLD VENIPUNCTURE: CPT | Mod: PN | Performed by: FAMILY MEDICINE

## 2022-06-08 ENCOUNTER — PATIENT MESSAGE (OUTPATIENT)
Dept: FAMILY MEDICINE | Facility: CLINIC | Age: 47
End: 2022-06-08
Payer: COMMERCIAL

## 2022-06-09 ENCOUNTER — PATIENT MESSAGE (OUTPATIENT)
Dept: FAMILY MEDICINE | Facility: CLINIC | Age: 47
End: 2022-06-09
Payer: COMMERCIAL

## 2022-06-24 DIAGNOSIS — E11.8 TYPE 2 DIABETES WITH COMPLICATION: ICD-10-CM

## 2022-06-24 RX ORDER — METFORMIN HYDROCHLORIDE 500 MG/1
TABLET ORAL
Qty: 90 TABLET | Refills: 3 | Status: SHIPPED | OUTPATIENT
Start: 2022-06-24 | End: 2023-09-25 | Stop reason: SDUPTHER

## 2022-06-30 ENCOUNTER — HOSPITAL ENCOUNTER (OUTPATIENT)
Facility: HOSPITAL | Age: 47
Discharge: HOME | End: 2022-06-30
Attending: INTERNAL MEDICINE | Admitting: INTERNAL MEDICINE
Payer: COMMERCIAL

## 2022-06-30 ENCOUNTER — ANESTHESIA EVENT (OUTPATIENT)
Dept: ENDOSCOPY | Facility: HOSPITAL | Age: 47
End: 2022-06-30
Payer: COMMERCIAL

## 2022-06-30 ENCOUNTER — ANESTHESIA (OUTPATIENT)
Dept: ENDOSCOPY | Facility: HOSPITAL | Age: 47
End: 2022-06-30
Payer: COMMERCIAL

## 2022-06-30 DIAGNOSIS — Z12.11 SCREEN FOR COLON CANCER: Primary | ICD-10-CM

## 2022-06-30 LAB
B-HCG UR QL: NEGATIVE
CTP QC/QA: YES
GLUCOSE SERPL-MCNC: 114 MG/DL (ref 70–110)

## 2022-06-30 PROCEDURE — D9220A PRA ANESTHESIA: ICD-10-PCS | Mod: ANES,,, | Performed by: ANESTHESIOLOGY

## 2022-06-30 PROCEDURE — G0121 COLON CA SCRN NOT HI RSK IND: HCPCS | Mod: ,,, | Performed by: INTERNAL MEDICINE

## 2022-06-30 PROCEDURE — D9220A PRA ANESTHESIA: Mod: ANES,,, | Performed by: ANESTHESIOLOGY

## 2022-06-30 PROCEDURE — 81025 URINE PREGNANCY TEST: CPT | Mod: PO | Performed by: INTERNAL MEDICINE

## 2022-06-30 PROCEDURE — 63600175 PHARM REV CODE 636 W HCPCS: Mod: PO | Performed by: INTERNAL MEDICINE

## 2022-06-30 PROCEDURE — 25000003 PHARM REV CODE 250: Mod: PO | Performed by: NURSE ANESTHETIST, CERTIFIED REGISTERED

## 2022-06-30 PROCEDURE — G0121 COLON CA SCRN NOT HI RSK IND: HCPCS | Mod: PO | Performed by: INTERNAL MEDICINE

## 2022-06-30 PROCEDURE — 37000008 HC ANESTHESIA 1ST 15 MINUTES: Mod: PO | Performed by: INTERNAL MEDICINE

## 2022-06-30 PROCEDURE — D9220A PRA ANESTHESIA: Mod: CRNA,,, | Performed by: NURSE ANESTHETIST, CERTIFIED REGISTERED

## 2022-06-30 PROCEDURE — D9220A PRA ANESTHESIA: ICD-10-PCS | Mod: CRNA,,, | Performed by: NURSE ANESTHETIST, CERTIFIED REGISTERED

## 2022-06-30 PROCEDURE — 37000009 HC ANESTHESIA EA ADD 15 MINS: Mod: PO | Performed by: INTERNAL MEDICINE

## 2022-06-30 PROCEDURE — 63600175 PHARM REV CODE 636 W HCPCS: Mod: PO | Performed by: NURSE ANESTHETIST, CERTIFIED REGISTERED

## 2022-06-30 PROCEDURE — G0121 COLON CA SCRN NOT HI RSK IND: ICD-10-PCS | Mod: ,,, | Performed by: INTERNAL MEDICINE

## 2022-06-30 RX ORDER — PROPOFOL 10 MG/ML
VIAL (ML) INTRAVENOUS CONTINUOUS PRN
Status: DISCONTINUED | OUTPATIENT
Start: 2022-06-30 | End: 2022-06-30

## 2022-06-30 RX ORDER — SODIUM CHLORIDE 0.9 % (FLUSH) 0.9 %
10 SYRINGE (ML) INJECTION EVERY 6 HOURS PRN
Status: DISCONTINUED | OUTPATIENT
Start: 2022-06-30 | End: 2022-06-30 | Stop reason: HOSPADM

## 2022-06-30 RX ORDER — PROPOFOL 10 MG/ML
VIAL (ML) INTRAVENOUS
Status: DISCONTINUED | OUTPATIENT
Start: 2022-06-30 | End: 2022-06-30

## 2022-06-30 RX ORDER — SODIUM CHLORIDE, SODIUM LACTATE, POTASSIUM CHLORIDE, CALCIUM CHLORIDE 600; 310; 30; 20 MG/100ML; MG/100ML; MG/100ML; MG/100ML
INJECTION, SOLUTION INTRAVENOUS CONTINUOUS
Status: DISCONTINUED | OUTPATIENT
Start: 2022-06-30 | End: 2022-06-30 | Stop reason: HOSPADM

## 2022-06-30 RX ORDER — LIDOCAINE HCL/PF 100 MG/5ML
SYRINGE (ML) INTRAVENOUS
Status: DISCONTINUED | OUTPATIENT
Start: 2022-06-30 | End: 2022-06-30

## 2022-06-30 RX ADMIN — SODIUM CHLORIDE, SODIUM LACTATE, POTASSIUM CHLORIDE, AND CALCIUM CHLORIDE: .6; .31; .03; .02 INJECTION, SOLUTION INTRAVENOUS at 08:06

## 2022-06-30 RX ADMIN — PROPOFOL 150 MCG/KG/MIN: 10 INJECTION, EMULSION INTRAVENOUS at 09:06

## 2022-06-30 RX ADMIN — LIDOCAINE HYDROCHLORIDE 100 MG: 20 INJECTION, SOLUTION INTRAVENOUS at 08:06

## 2022-06-30 RX ADMIN — PROPOFOL 100 MG: 10 INJECTION, EMULSION INTRAVENOUS at 08:06

## 2022-06-30 RX ADMIN — PROPOFOL 50 MG: 10 INJECTION, EMULSION INTRAVENOUS at 09:06

## 2022-06-30 NOTE — ANESTHESIA PREPROCEDURE EVALUATION
06/30/2022  Ana Dorantes is a 47 y.o., female.      Pre-op Assessment    I have reviewed the NPO Status.   I have reviewed the Medications.     Review of Systems  Anesthesia Hx:  No problems with previous Anesthesia    Social:  Non-Smoker    Cardiovascular:   Exercise tolerance: good Hypertension    Pulmonary:   Sleep Apnea    Education provided regarding risk of obstructive sleep apnea     Hepatic/GI:   Bowel Prep. GERD, well controlled Liver Disease,    Endocrine:   Diabetes, well controlled, type 2        Physical Exam  General: Well nourished    Airway:  Mallampati: II / I  Mouth Opening: Normal  TM Distance: Normal  Tongue: Normal  Neck ROM: Normal ROM    Dental:  Intact    Chest/Lungs:  Clear to auscultation, Normal Respiratory Rate        Anesthesia Plan  Type of Anesthesia, risks & benefits discussed:    Anesthesia Type: Gen Natural Airway  Intra-op Monitoring Plan: Standard ASA Monitors  Induction:  IV  Informed Consent: Informed consent signed with the Patient and all parties understand the risks and agree with anesthesia plan.  All questions answered.   ASA Score: 2    Ready For Surgery From Anesthesia Perspective.     .

## 2022-06-30 NOTE — PROVATION PATIENT INSTRUCTIONS
Discharge Summary/Instructions after an Endoscopic Procedure  Patient Name: Ana Dorantes  Patient MRN: [id number]  Patient YOB: 1975 Thursday, June 30, 2022  Ran Dickerson MD  Dear patient,  As a result of recent federal legislation (The Federal Cures Act), you may   receive lab or pathology results from your procedure in your [facility]   account before your physician is able to contact you. Your physician or   their representative will relay the results to you with their   recommendations at their soonest availability.  Thank you,  RESTRICTIONS:  During your procedure today, you received medications for sedation.  These   medications may affect your judgment, balance and coordination.  Therefore,   for 24 hours, you have the following restrictions:   - DO NOT drive a car, operate machinery, make legal/financial decisions,   sign important papers or drink alcohol.    ACTIVITY:  Today: no heavy lifting, straining or running due to procedural   sedation/anesthesia.  The following day: return to full activity including work.  DIET:  Eat and drink normally unless instructed otherwise.     TREATMENT FOR COMMON SIDE EFFECTS:  - Mild abdominal pain, nausea, belching, bloating or excessive gas:  rest,   eat lightly and use a heating pad.  - Sore Throat: treat with throat lozenges and/or gargle with warm salt   water.  - Because air was used during the procedure, expelling large amounts of air   from your rectum or belching is normal.  - If a bowel prep was taken, you may not have a bowel movement for 1-3 days.    This is normal.  SYMPTOMS TO WATCH FOR AND REPORT TO YOUR PHYSICIAN:  1. Abdominal pain or bloating, other than gas cramps.  2. Chest pain.  3. Back pain.  4. Signs of infection such as: chills or fever occurring within 24 hours   after the procedure.  5. Rectal bleeding, which would show as bright red, maroon, or black stools.   (A tablespoon of blood from the rectum is not serious, especially  if   hemorrhoids are present.)  6. Vomiting.  7. Weakness or dizziness.  GO DIRECTLY TO THE NEAREST EMERGENCY ROOM IF YOU HAVE ANY OF THE FOLLOWING:      Difficulty breathing              Chills and/or fever over 101 F   Persistent vomiting and/or vomiting blood   Severe abdominal pain   Severe chest pain   Black, tarry stools   Bleeding- more than one tablespoon   Any other symptom or condition that you feel may need urgent attention  Your doctor recommends these additional instructions:  If any biopsies were taken, your doctors clinic will contact you in 1 to 2   weeks with any results.  Your physician has recommended a repeat colonoscopy in 10 years for   screening purposes.   You are being discharged to home.   Advance your diet as tolerated.   Continue your present medications.  For questions, problems or results please call your physician - Ran Dickerson MD at Work:  [phone].  EMERGENCY PHONE NUMBER: [phone], LAB RESULTS: [phone]  IF A COMPLICATION OR EMERGENCY SITUATION ARISES AND YOU ARE UNABLE TO REACH   YOUR PHYSICIAN - GO DIRECTLY TO THE EMERGENCY ROOM.  ___________________________________________  Nurse Signature  ___________________________________________  Patient/Designated Responsible Party Signature  Ran Dickerson MD  6/30/2022 9:22:41 AM  This report has been verified and signed electronically.  Dear patient,  As a result of recent federal legislation (The Federal Cures Act), you may   receive lab or pathology results from your procedure in your [facility]   account before your physician is able to contact you. Your physician or   their representative will relay the results to you with their   recommendations at their soonest availability.  Thank you.  PROVATION

## 2022-06-30 NOTE — TRANSFER OF CARE
"Anesthesia Transfer of Care Note    Patient: Ana Dorantes    Procedure(s) Performed: Procedure(s) (LRB):  COLONOSCOPY (N/A)    Patient location: PACU    Anesthesia Type: general    Transport from OR: Transported from OR on 2-3 L/min O2 by NC with adequate spontaneous ventilation    Post pain: adequate analgesia    Post assessment: tolerated procedure well and no apparent anesthetic complications    Post vital signs: stable    Level of consciousness: awake    Nausea/Vomiting: no nausea/vomiting    Complications: none    Transfer of care protocol was followed      Last vitals:   Visit Vitals  BP (!) 140/81 (BP Location: Right arm, Patient Position: Lying)   Pulse 90   Temp 37 °C (98.6 °F)   Resp 15   Ht 5' 4" (1.626 m)   Wt 80.7 kg (178 lb)   LMP 06/29/2022 (Exact Date)   SpO2 95%   Breastfeeding No   BMI 30.55 kg/m²     "

## 2022-06-30 NOTE — H&P
History & Physical - Short Stay  Gastroenterology      SUBJECTIVE:     Procedure: Colonoscopy    Chief Complaint/Indication for Procedure: Screening    PTA Medications   Medication Sig    biotin 10 mg Tab Take by mouth.    cyanocobalamin (VITAMIN B-12) 100 MCG tablet Take 100 mcg by mouth once daily.    ergocalciferol, vitamin D2, (VITAMIN D ORAL) Take by mouth.    fluticasone (FLONASE) 50 mcg/actuation nasal spray 2 sprays by Each Nare route once daily. (Patient taking differently: 2 sprays by Each Nostril route daily as needed.)    metFORMIN (GLUCOPHAGE) 500 MG tablet TAKE 1 TABLET BY MOUTH EVERY DAY WITH BREAKFAST    semaglutide (OZEMPIC) 0.25 mg or 0.5 mg(2 mg/1.5 mL) pen injector Inject 0.5 mg into the skin every 7 days.    spironolactone (ALDACTONE) 50 MG tablet Take 50 mg by mouth once daily.    triamterene-hydrochlorothiazide 37.5-25 mg (MAXZIDE-25) 37.5-25 mg per tablet TAKE 1 TABLET BY MOUTH EVERY DAY    ascorbic acid, vitamin C, (VITAMIN C) 1000 MG tablet Take 1,000 mg by mouth once daily.    blood sugar diagnostic Strp 1 each by Misc.(Non-Drug; Combo Route) route once daily.    blood-glucose meter (BLOOD GLUCOSE MONITORING) kit Use as instructed    clonazePAM (KLONOPIN) 0.5 MG tablet Take 1 tablet (0.5 mg total) by mouth daily as needed for Anxiety. (Patient not taking: Reported on 2022)    EPINEPHrine (EPIPEN) 0.3 mg/0.3 mL AtIn Inject 0.3 mLs (0.3 mg total) into the muscle once. for 1 dose (Patient not taking: Reported on 2022)       Review of patient's allergies indicates:   Allergen Reactions    Eggs [egg derived] Anaphylaxis        Past Medical History:   Diagnosis Date    YAIR positive     Diabetes     Fatty liver     GERD (gastroesophageal reflux disease)     Hypertension     Sleep apnea     uses CPAP     Past Surgical History:   Procedure Laterality Date    BELT ABDOMINOPLASTY       SECTION      x 2    NASAL SEPTUM SURGERY       Family History   Problem  Relation Age of Onset    Hypertension Mother     Ovarian cancer Maternal Grandmother     Allergic rhinitis Neg Hx     Allergies Neg Hx     Angioedema Neg Hx     Asthma Neg Hx     Atopy Neg Hx     Eczema Neg Hx     Immunodeficiency Neg Hx     Rhinitis Neg Hx     Urticaria Neg Hx      Social History     Tobacco Use    Smoking status: Never Smoker    Smokeless tobacco: Never Used   Substance Use Topics    Alcohol use: No     Comment: socially    Drug use: No     OBJECTIVE:     Physical Exam:                                                       GENERAL:  Comfortable, in no acute distress.                                 HEENT EXAM:  Nonicteric.  No adenopathy.  Oropharynx is clear.               NECK:  Supple.                                                               LUNGS:  Clear.                                                               CARDIAC:  Regular rate and rhythm.  S1, S2.  No murmur.                      ABDOMEN:  Soft, positive bowel sounds, nontender.  No hepatosplenomegaly or masses.  No rebound or guarding.                                             EXTREMITIES:  No edema.     MENTAL STATUS:  Normal, alert and oriented.      ASSESSMENT/PLAN:     Assessment: Screening    Plan: Colonoscopy

## 2022-06-30 NOTE — ANESTHESIA POSTPROCEDURE EVALUATION
Anesthesia Post Evaluation    Patient: Ana Dorantes    Procedure(s) Performed: Procedure(s) (LRB):  COLONOSCOPY (N/A)    Final Anesthesia Type: general      Patient location during evaluation: PACU  Patient participation: Yes- Able to Participate  Level of consciousness: awake and alert and oriented  Post-procedure vital signs: reviewed and stable  Pain management: adequate  Airway patency: patent    PONV status at discharge: No PONV  Anesthetic complications: no      Cardiovascular status: blood pressure returned to baseline  Respiratory status: unassisted, spontaneous ventilation and room air  Hydration status: euvolemic  Follow-up not needed.          Vitals Value Taken Time   /63 06/30/22 0928   Temp 36.7 °C (98 °F) 06/30/22 0924   Pulse 92 06/30/22 0928   Resp 16 06/30/22 0928   SpO2 96 % 06/30/22 0928         No case tracking events are documented in the log.      Pain/Ayleen Score: Ayleen Score: 9 (6/30/2022  9:23 AM)

## 2022-07-01 VITALS
RESPIRATION RATE: 16 BRPM | TEMPERATURE: 98 F | BODY MASS INDEX: 30.39 KG/M2 | SYSTOLIC BLOOD PRESSURE: 119 MMHG | HEART RATE: 85 BPM | DIASTOLIC BLOOD PRESSURE: 84 MMHG | HEIGHT: 64 IN | OXYGEN SATURATION: 96 % | WEIGHT: 178 LBS

## 2022-07-20 ENCOUNTER — OFFICE VISIT (OUTPATIENT)
Dept: URGENT CARE | Facility: CLINIC | Age: 47
End: 2022-07-20
Payer: COMMERCIAL

## 2022-07-20 VITALS
HEIGHT: 64 IN | DIASTOLIC BLOOD PRESSURE: 99 MMHG | HEART RATE: 110 BPM | WEIGHT: 178 LBS | OXYGEN SATURATION: 99 % | BODY MASS INDEX: 30.39 KG/M2 | SYSTOLIC BLOOD PRESSURE: 137 MMHG | TEMPERATURE: 100 F | RESPIRATION RATE: 16 BRPM

## 2022-07-20 DIAGNOSIS — U07.1 COVID-19: ICD-10-CM

## 2022-07-20 DIAGNOSIS — U07.1 COVID-19 VIRUS DETECTED: ICD-10-CM

## 2022-07-20 DIAGNOSIS — J02.9 SORE THROAT: Primary | ICD-10-CM

## 2022-07-20 LAB
CTP QC/QA: YES
SARS-COV-2 RDRP RESP QL NAA+PROBE: POSITIVE

## 2022-07-20 PROCEDURE — 3008F PR BODY MASS INDEX (BMI) DOCUMENTED: ICD-10-PCS | Mod: CPTII,S$GLB,, | Performed by: PHYSICIAN ASSISTANT

## 2022-07-20 PROCEDURE — 99213 PR OFFICE/OUTPT VISIT, EST, LEVL III, 20-29 MIN: ICD-10-PCS | Mod: S$GLB,,, | Performed by: PHYSICIAN ASSISTANT

## 2022-07-20 PROCEDURE — 1160F PR REVIEW ALL MEDS BY PRESCRIBER/CLIN PHARMACIST DOCUMENTED: ICD-10-PCS | Mod: CPTII,S$GLB,, | Performed by: PHYSICIAN ASSISTANT

## 2022-07-20 PROCEDURE — 3075F SYST BP GE 130 - 139MM HG: CPT | Mod: CPTII,S$GLB,, | Performed by: PHYSICIAN ASSISTANT

## 2022-07-20 PROCEDURE — 3080F DIAST BP >= 90 MM HG: CPT | Mod: CPTII,S$GLB,, | Performed by: PHYSICIAN ASSISTANT

## 2022-07-20 PROCEDURE — U0002: ICD-10-PCS | Mod: QW,S$GLB,, | Performed by: PHYSICIAN ASSISTANT

## 2022-07-20 PROCEDURE — 99213 OFFICE O/P EST LOW 20 MIN: CPT | Mod: S$GLB,,, | Performed by: PHYSICIAN ASSISTANT

## 2022-07-20 PROCEDURE — 3044F HG A1C LEVEL LT 7.0%: CPT | Mod: CPTII,S$GLB,, | Performed by: PHYSICIAN ASSISTANT

## 2022-07-20 PROCEDURE — 3008F BODY MASS INDEX DOCD: CPT | Mod: CPTII,S$GLB,, | Performed by: PHYSICIAN ASSISTANT

## 2022-07-20 PROCEDURE — 1159F PR MEDICATION LIST DOCUMENTED IN MEDICAL RECORD: ICD-10-PCS | Mod: CPTII,S$GLB,, | Performed by: PHYSICIAN ASSISTANT

## 2022-07-20 PROCEDURE — 3066F NEPHROPATHY DOC TX: CPT | Mod: CPTII,S$GLB,, | Performed by: PHYSICIAN ASSISTANT

## 2022-07-20 PROCEDURE — 3044F PR MOST RECENT HEMOGLOBIN A1C LEVEL <7.0%: ICD-10-PCS | Mod: CPTII,S$GLB,, | Performed by: PHYSICIAN ASSISTANT

## 2022-07-20 PROCEDURE — 3075F PR MOST RECENT SYSTOLIC BLOOD PRESS GE 130-139MM HG: ICD-10-PCS | Mod: CPTII,S$GLB,, | Performed by: PHYSICIAN ASSISTANT

## 2022-07-20 PROCEDURE — 1159F MED LIST DOCD IN RCRD: CPT | Mod: CPTII,S$GLB,, | Performed by: PHYSICIAN ASSISTANT

## 2022-07-20 PROCEDURE — 3061F NEG MICROALBUMINURIA REV: CPT | Mod: CPTII,S$GLB,, | Performed by: PHYSICIAN ASSISTANT

## 2022-07-20 PROCEDURE — 1160F RVW MEDS BY RX/DR IN RCRD: CPT | Mod: CPTII,S$GLB,, | Performed by: PHYSICIAN ASSISTANT

## 2022-07-20 PROCEDURE — 3061F PR NEG MICROALBUMINURIA RESULT DOCUMENTED/REVIEW: ICD-10-PCS | Mod: CPTII,S$GLB,, | Performed by: PHYSICIAN ASSISTANT

## 2022-07-20 PROCEDURE — 3066F PR DOCUMENTATION OF TREATMENT FOR NEPHROPATHY: ICD-10-PCS | Mod: CPTII,S$GLB,, | Performed by: PHYSICIAN ASSISTANT

## 2022-07-20 PROCEDURE — 3080F PR MOST RECENT DIASTOLIC BLOOD PRESSURE >= 90 MM HG: ICD-10-PCS | Mod: CPTII,S$GLB,, | Performed by: PHYSICIAN ASSISTANT

## 2022-07-20 PROCEDURE — U0002 COVID-19 LAB TEST NON-CDC: HCPCS | Mod: QW,S$GLB,, | Performed by: PHYSICIAN ASSISTANT

## 2022-07-20 NOTE — PROGRESS NOTES
"Subjective:       Patient ID: Ana Dorantes is a 47 y.o. female.    Vitals:  height is 5' 4" (1.626 m) and weight is 80.7 kg (178 lb). Her oral temperature is 99.7 °F (37.6 °C). Her blood pressure is 137/99 (abnormal) and her pulse is 110. Her respiration is 16 and oxygen saturation is 99%.     Chief Complaint: Sore Throat    Pt presents today with sore throat, bilateral ear ache, fever, cough, body aches, headache, and congestion x 1 day. OTC meds taken with no relief and pain scale is 6/10. Pt is Covid vacs  with no known.     .    Sore Throat   This is a new problem. The current episode started yesterday. The problem has been gradually worsening. Maximum temperature: 99.4. The pain is at a severity of 6/10. The pain is moderate. Associated symptoms include congestion, coughing, ear pain, headaches, a plugged ear sensation and trouble swallowing. Pertinent negatives include no abdominal pain, diarrhea, shortness of breath or vomiting. Treatments tried: alkisetzer plus, robitussin.       Constitution: Negative for chills, sweating, fatigue and fever.   HENT: Positive for ear pain, congestion, sore throat and trouble swallowing.    Neck: Negative for neck stiffness.   Cardiovascular: Negative for chest pain.   Respiratory: Positive for cough. Negative for shortness of breath.    Gastrointestinal: Negative for abdominal pain, nausea, vomiting and diarrhea.   Neurological: Positive for headaches.       Objective:      Physical Exam   Constitutional: She is oriented to person, place, and time. She appears well-developed.  Non-toxic appearance. She does not appear ill. No distress.   HENT:   Head: Normocephalic and atraumatic.   Ears:   Right Ear: Hearing and external ear normal.   Left Ear: Hearing and external ear normal.   Eyes: Conjunctivae and EOM are normal. Pupils are equal, round, and reactive to light.   Neck: Neck supple. No neck rigidity present.   Cardiovascular: Normal rate and regular rhythm. "   Pulmonary/Chest: Effort normal and breath sounds normal. No respiratory distress. She has no decreased breath sounds. She has no wheezes. She has no rhonchi.   Lungs CTA         Comments: Lungs CTA    Neurological: She is alert and oriented to person, place, and time.   Skin: Skin is warm, dry and not diaphoretic.   Psychiatric: Her speech is normal and behavior is normal. Mood normal.   Nursing note and vitals reviewed.    Office Visit on 07/20/2022   Component Date Value Ref Range Status    POC Rapid COVID 07/20/2022 Positive (A) Negative Final     Acceptable 07/20/2022 Yes   Final           Assessment:       1. Sore throat    2. COVID-19          Plan:       Past medical hx, family hx, social hx, and current medications were provided by the patient and were reviewed. Diagnostics performed today were discussed. Dx and tx were discussed with the patient. Quarantine recommendations based on CDC guidelines discussed. Return to [facility] for any concern. Follow up with PCP for any persistence of problem, or worsening. ER precautions. Pt verbalized understanding of all discussed, and agreed.    Some portions of the physical exam were omitted to help reduce chance of viral transmission.    Sore throat  -     POCT COVID-19 Rapid Screening    COVID-19  -     molnupiravir 200 mg capsule (EUA); Take 4 capsules (800 mg total) by mouth every 12 (twelve) hours. for 5 days  Dispense: 40 capsule; Refill: 0      Patient Instructions   · Follow up with your primary care if symptoms do not improve, or you may return here at any time.  · If you were referred to a specialist, please follow up with that specialty.  · If you were prescribed antibiotics, please take them to completion.  · If you were prescribed a narcotic or any medication with sedative effects, do not drive or operate heavy equipment or machinery while taking these medications.  · You must understand that you have received treatment at an Urgent Care  "facility only, and that you may be released before all of your medical problems are known or treated. Urgent Care facilities are not equipped to handle life threatening emergencies. It is recommended that you seek care at an Emergency Department for further evaluation of worsening or concerning symptoms, or possibly life threatening conditions as discussed.                                        If you  smoke, please stop smoking      You have tested POSITIVE for COVID-19 today    Quarantine recommendations based on CDC guidelines:      No symptoms present: You must quarantine for 5 days starting on the day of the positive test.    Symptoms present: You must quarantine for 5 days starting on the day of symptom onset.      AFTER 5 days, if your symptoms have improved and you are fever free, you can return to the community on day 6.   The CDC recommends strict mask use for 5 days following quarantine end.      You cannot test negative to "test out" of quarantine.      Please refer to CDC website for additional information                     "

## 2022-09-08 ENCOUNTER — OFFICE VISIT (OUTPATIENT)
Dept: FAMILY MEDICINE | Facility: CLINIC | Age: 47
End: 2022-09-08
Payer: COMMERCIAL

## 2022-09-08 VITALS
SYSTOLIC BLOOD PRESSURE: 132 MMHG | WEIGHT: 174.69 LBS | BODY MASS INDEX: 29.82 KG/M2 | TEMPERATURE: 98 F | HEART RATE: 88 BPM | DIASTOLIC BLOOD PRESSURE: 88 MMHG | OXYGEN SATURATION: 96 % | RESPIRATION RATE: 20 BRPM | HEIGHT: 64 IN

## 2022-09-08 DIAGNOSIS — L65.0 TELOGEN EFFLUVIUM: ICD-10-CM

## 2022-09-08 DIAGNOSIS — E11.8 DIABETES MELLITUS TYPE 2 WITH COMPLICATIONS: ICD-10-CM

## 2022-09-08 DIAGNOSIS — I15.2 HYPERTENSION ASSOCIATED WITH DIABETES: ICD-10-CM

## 2022-09-08 DIAGNOSIS — E11.59 HYPERTENSION ASSOCIATED WITH DIABETES: ICD-10-CM

## 2022-09-08 PROCEDURE — 3079F DIAST BP 80-89 MM HG: CPT | Mod: CPTII,S$GLB,, | Performed by: FAMILY MEDICINE

## 2022-09-08 PROCEDURE — 1159F PR MEDICATION LIST DOCUMENTED IN MEDICAL RECORD: ICD-10-PCS | Mod: CPTII,S$GLB,, | Performed by: FAMILY MEDICINE

## 2022-09-08 PROCEDURE — 99214 OFFICE O/P EST MOD 30 MIN: CPT | Mod: S$GLB,,, | Performed by: FAMILY MEDICINE

## 2022-09-08 PROCEDURE — 3075F PR MOST RECENT SYSTOLIC BLOOD PRESS GE 130-139MM HG: ICD-10-PCS | Mod: CPTII,S$GLB,, | Performed by: FAMILY MEDICINE

## 2022-09-08 PROCEDURE — 3061F PR NEG MICROALBUMINURIA RESULT DOCUMENTED/REVIEW: ICD-10-PCS | Mod: CPTII,S$GLB,, | Performed by: FAMILY MEDICINE

## 2022-09-08 PROCEDURE — 99214 PR OFFICE/OUTPT VISIT, EST, LEVL IV, 30-39 MIN: ICD-10-PCS | Mod: S$GLB,,, | Performed by: FAMILY MEDICINE

## 2022-09-08 PROCEDURE — 3079F PR MOST RECENT DIASTOLIC BLOOD PRESSURE 80-89 MM HG: ICD-10-PCS | Mod: CPTII,S$GLB,, | Performed by: FAMILY MEDICINE

## 2022-09-08 PROCEDURE — 1159F MED LIST DOCD IN RCRD: CPT | Mod: CPTII,S$GLB,, | Performed by: FAMILY MEDICINE

## 2022-09-08 PROCEDURE — 3044F PR MOST RECENT HEMOGLOBIN A1C LEVEL <7.0%: ICD-10-PCS | Mod: CPTII,S$GLB,, | Performed by: FAMILY MEDICINE

## 2022-09-08 PROCEDURE — 3066F PR DOCUMENTATION OF TREATMENT FOR NEPHROPATHY: ICD-10-PCS | Mod: CPTII,S$GLB,, | Performed by: FAMILY MEDICINE

## 2022-09-08 PROCEDURE — 3008F BODY MASS INDEX DOCD: CPT | Mod: CPTII,S$GLB,, | Performed by: FAMILY MEDICINE

## 2022-09-08 PROCEDURE — 3061F NEG MICROALBUMINURIA REV: CPT | Mod: CPTII,S$GLB,, | Performed by: FAMILY MEDICINE

## 2022-09-08 PROCEDURE — 3066F NEPHROPATHY DOC TX: CPT | Mod: CPTII,S$GLB,, | Performed by: FAMILY MEDICINE

## 2022-09-08 PROCEDURE — 3008F PR BODY MASS INDEX (BMI) DOCUMENTED: ICD-10-PCS | Mod: CPTII,S$GLB,, | Performed by: FAMILY MEDICINE

## 2022-09-08 PROCEDURE — 3075F SYST BP GE 130 - 139MM HG: CPT | Mod: CPTII,S$GLB,, | Performed by: FAMILY MEDICINE

## 2022-09-08 PROCEDURE — 1160F PR REVIEW ALL MEDS BY PRESCRIBER/CLIN PHARMACIST DOCUMENTED: ICD-10-PCS | Mod: CPTII,S$GLB,, | Performed by: FAMILY MEDICINE

## 2022-09-08 PROCEDURE — 3044F HG A1C LEVEL LT 7.0%: CPT | Mod: CPTII,S$GLB,, | Performed by: FAMILY MEDICINE

## 2022-09-08 PROCEDURE — 1160F RVW MEDS BY RX/DR IN RCRD: CPT | Mod: CPTII,S$GLB,, | Performed by: FAMILY MEDICINE

## 2022-09-08 RX ORDER — SEMAGLUTIDE 1.34 MG/ML
1 INJECTION, SOLUTION SUBCUTANEOUS
Qty: 1 PEN | Refills: 2 | Status: SHIPPED | OUTPATIENT
Start: 2022-09-08 | End: 2022-12-05

## 2022-09-08 RX ORDER — ONDANSETRON HYDROCHLORIDE 8 MG/1
8 TABLET, FILM COATED ORAL EVERY 8 HOURS PRN
Qty: 10 TABLET | Refills: 2 | Status: SHIPPED | OUTPATIENT
Start: 2022-09-08 | End: 2023-05-15

## 2022-09-08 RX ORDER — FINASTERIDE 5 MG/1
5 TABLET, FILM COATED ORAL DAILY
COMMUNITY
Start: 2022-08-26

## 2022-09-09 ENCOUNTER — LAB VISIT (OUTPATIENT)
Dept: LAB | Facility: HOSPITAL | Age: 47
End: 2022-09-09
Attending: FAMILY MEDICINE
Payer: COMMERCIAL

## 2022-09-09 DIAGNOSIS — L65.0 TELOGEN EFFLUVIUM: ICD-10-CM

## 2022-09-09 DIAGNOSIS — I15.2 HYPERTENSION ASSOCIATED WITH DIABETES: ICD-10-CM

## 2022-09-09 DIAGNOSIS — E11.59 HYPERTENSION ASSOCIATED WITH DIABETES: ICD-10-CM

## 2022-09-09 DIAGNOSIS — E11.8 DIABETES MELLITUS TYPE 2 WITH COMPLICATIONS: ICD-10-CM

## 2022-09-09 LAB
25(OH)D3+25(OH)D2 SERPL-MCNC: 50 NG/ML (ref 30–96)
ALBUMIN SERPL BCP-MCNC: 3.5 G/DL (ref 3.5–5.2)
ALP SERPL-CCNC: 75 U/L (ref 55–135)
ALT SERPL W/O P-5'-P-CCNC: 48 U/L (ref 10–44)
ANION GAP SERPL CALC-SCNC: 13 MMOL/L (ref 8–16)
AST SERPL-CCNC: 66 U/L (ref 10–40)
BASOPHILS # BLD AUTO: 0.04 K/UL (ref 0–0.2)
BASOPHILS NFR BLD: 0.5 % (ref 0–1.9)
BILIRUB SERPL-MCNC: 0.5 MG/DL (ref 0.1–1)
BUN SERPL-MCNC: 11 MG/DL (ref 6–20)
CALCIUM SERPL-MCNC: 9.2 MG/DL (ref 8.7–10.5)
CHLORIDE SERPL-SCNC: 104 MMOL/L (ref 95–110)
CO2 SERPL-SCNC: 20 MMOL/L (ref 23–29)
CREAT SERPL-MCNC: 0.7 MG/DL (ref 0.5–1.4)
DIFFERENTIAL METHOD: ABNORMAL
EOSINOPHIL # BLD AUTO: 0.1 K/UL (ref 0–0.5)
EOSINOPHIL NFR BLD: 1.2 % (ref 0–8)
ERYTHROCYTE [DISTWIDTH] IN BLOOD BY AUTOMATED COUNT: 14.3 % (ref 11.5–14.5)
EST. GFR  (NO RACE VARIABLE): >60 ML/MIN/1.73 M^2
ESTIMATED AVG GLUCOSE: 117 MG/DL (ref 68–131)
FERRITIN SERPL-MCNC: 95 NG/ML (ref 20–300)
GLUCOSE SERPL-MCNC: 94 MG/DL (ref 70–110)
HBA1C MFR BLD: 5.7 % (ref 4–5.6)
HCT VFR BLD AUTO: 38.2 % (ref 37–48.5)
HGB BLD-MCNC: 12.3 G/DL (ref 12–16)
IMM GRANULOCYTES # BLD AUTO: 0.05 K/UL (ref 0–0.04)
IMM GRANULOCYTES NFR BLD AUTO: 0.6 % (ref 0–0.5)
IRON SERPL-MCNC: 76 UG/DL (ref 30–160)
LYMPHOCYTES # BLD AUTO: 2.6 K/UL (ref 1–4.8)
LYMPHOCYTES NFR BLD: 30.2 % (ref 18–48)
MCH RBC QN AUTO: 28.6 PG (ref 27–31)
MCHC RBC AUTO-ENTMCNC: 32.2 G/DL (ref 32–36)
MCV RBC AUTO: 89 FL (ref 82–98)
MONOCYTES # BLD AUTO: 0.7 K/UL (ref 0.3–1)
MONOCYTES NFR BLD: 7.7 % (ref 4–15)
NEUTROPHILS # BLD AUTO: 5.2 K/UL (ref 1.8–7.7)
NEUTROPHILS NFR BLD: 59.8 % (ref 38–73)
NRBC BLD-RTO: 0 /100 WBC
PLATELET # BLD AUTO: 356 K/UL (ref 150–450)
PMV BLD AUTO: 11 FL (ref 9.2–12.9)
POTASSIUM SERPL-SCNC: 3.9 MMOL/L (ref 3.5–5.1)
PROT SERPL-MCNC: 8.8 G/DL (ref 6–8.4)
RBC # BLD AUTO: 4.3 M/UL (ref 4–5.4)
SATURATED IRON: 14 % (ref 20–50)
SODIUM SERPL-SCNC: 137 MMOL/L (ref 136–145)
TOTAL IRON BINDING CAPACITY: 539 UG/DL (ref 250–450)
TRANSFERRIN SERPL-MCNC: 364 MG/DL (ref 200–375)
TSH SERPL DL<=0.005 MIU/L-ACNC: 2.81 UIU/ML (ref 0.4–4)
WBC # BLD AUTO: 8.6 K/UL (ref 3.9–12.7)

## 2022-09-09 PROCEDURE — 84466 ASSAY OF TRANSFERRIN: CPT | Performed by: FAMILY MEDICINE

## 2022-09-09 PROCEDURE — 36415 COLL VENOUS BLD VENIPUNCTURE: CPT | Mod: PN | Performed by: FAMILY MEDICINE

## 2022-09-09 PROCEDURE — 82728 ASSAY OF FERRITIN: CPT | Performed by: FAMILY MEDICINE

## 2022-09-09 PROCEDURE — 82306 VITAMIN D 25 HYDROXY: CPT | Performed by: FAMILY MEDICINE

## 2022-09-09 PROCEDURE — 85025 COMPLETE CBC W/AUTO DIFF WBC: CPT | Performed by: FAMILY MEDICINE

## 2022-09-09 PROCEDURE — 83036 HEMOGLOBIN GLYCOSYLATED A1C: CPT | Performed by: FAMILY MEDICINE

## 2022-09-09 PROCEDURE — 84630 ASSAY OF ZINC: CPT | Performed by: FAMILY MEDICINE

## 2022-09-09 PROCEDURE — 84443 ASSAY THYROID STIM HORMONE: CPT | Performed by: FAMILY MEDICINE

## 2022-09-09 PROCEDURE — 80053 COMPREHEN METABOLIC PANEL: CPT | Performed by: FAMILY MEDICINE

## 2022-09-11 NOTE — PROGRESS NOTES
Subjective:       Patient ID: Ana Dorantes is a 47 y.o. female.    Chief Complaint: Follow-up    HPI  The patient is a 47-year-old who is here today for follow-up.  She tells me that she is doing great.      Today we discussed the followin) obesity.  With the Ozempic, she is losing weight slowly but surely.  Her weight was initially 197 and is now 174.  She is currently taking Ozempic 0.5 mg once a day.  She has had some nausea with the Ozempic but it is not bad.  She has been feeling some hunger again and is interested in increasing the dose of this medication  2) diabetes.  Her fasting sugars have been between 96 and 120.  Her last A1c was 5.9 in May (which was down from prior value of 7.7).  She is currently taking Ozempic and finds that this is working well for her    3) hair loss.  She is working with Dr. Rueda on her hair loss.  She is taking Proscar.  She has a prescription for Aldactone which she is going to start but wonders if she should take this with her Maxzide.  She also brings in lab order request from Dr. Rueda and she would like to have them done with her labs today    4) hypertension.  Today blood pressure is 132/88.  She is taking the Maxzide which she is tolerating well      Review of Systems   Constitutional:  Negative for appetite change, chills, diaphoresis, fatigue, fever and unexpected weight change.   HENT:  Negative for congestion, ear pain, postnasal drip, rhinorrhea, sinus pressure, sneezing, sore throat and trouble swallowing.    Eyes:  Negative for pain, discharge and visual disturbance.   Respiratory:  Negative for cough, chest tightness, shortness of breath and wheezing.    Cardiovascular:  Negative for chest pain, palpitations and leg swelling.   Gastrointestinal:  Negative for abdominal distention, abdominal pain, blood in stool, constipation, diarrhea, nausea and vomiting.   Skin:  Negative for rash.     Objective:      Physical Exam  Constitutional:        General: She is not in acute distress.     Appearance: Normal appearance. She is well-developed.   HENT:      Head: Normocephalic and atraumatic.      Right Ear: Hearing, tympanic membrane, ear canal and external ear normal.      Left Ear: Hearing, tympanic membrane, ear canal and external ear normal.      Nose: Nose normal.      Mouth/Throat:      Mouth: No oral lesions.      Pharynx: No oropharyngeal exudate or posterior oropharyngeal erythema.   Eyes:      General: Lids are normal. No scleral icterus.     Extraocular Movements: Extraocular movements intact.      Conjunctiva/sclera: Conjunctivae normal.      Pupils: Pupils are equal, round, and reactive to light.   Neck:      Thyroid: No thyroid mass or thyromegaly.      Vascular: No carotid bruit.   Cardiovascular:      Rate and Rhythm: Normal rate and regular rhythm. No extrasystoles are present.     Chest Wall: PMI is not displaced.      Heart sounds: Normal heart sounds. No murmur heard.    No gallop.   Pulmonary:      Effort: Pulmonary effort is normal. No accessory muscle usage or respiratory distress.      Breath sounds: Normal breath sounds.   Abdominal:      General: Bowel sounds are normal. There is no abdominal bruit.      Palpations: Abdomen is soft.      Tenderness: There is no abdominal tenderness. There is no rebound.   Musculoskeletal:      Cervical back: Normal range of motion and neck supple.   Lymphadenopathy:      Head:      Right side of head: No submental or submandibular adenopathy.      Left side of head: No submental or submandibular adenopathy.      Cervical:      Right cervical: No superficial, deep or posterior cervical adenopathy.     Left cervical: No superficial, deep or posterior cervical adenopathy.      Upper Body:      Right upper body: No supraclavicular adenopathy.      Left upper body: No supraclavicular adenopathy.   Skin:     General: Skin is warm and dry.   Neurological:      Mental Status: She is alert and oriented to  "person, place, and time.     Blood pressure 132/88, pulse 88, temperature 98.1 °F (36.7 °C), resp. rate 20, height 5' 4" (1.626 m), weight 79.2 kg (174 lb 11.4 oz), last menstrual period 08/19/2022, SpO2 96 %.Body mass index is 29.99 kg/m².          A/P:  1)  obesity.  Improved.  Continue with Ozempic.  We will increase the dose to 1 mg once a week.  She can use Zofran with this as needed.  She will let me know how she is doing with this in the next 4-6 weeks   2) diabetes.  Previously well controlled.  Continue with Ozempic.  We will check an A1c today   3) hair loss due telogen effluvium.  We will draw the labs requested by Dr. Rueda.  We will stop the Maxzide so that she could start Aldactone.  After she starts the Aldactone.  I would like to check a chemistry panel to assess her potassium and renal function with this medication  4) hypertension.  Well controlled.  We will stop the Maxzide and moved to Aldactone for hair regrowth.  We will reassess her blood pressure with the Aldactone  "

## 2022-09-13 LAB — ZINC SERPL-MCNC: 62 UG/DL (ref 60–130)

## 2022-09-19 ENCOUNTER — PATIENT MESSAGE (OUTPATIENT)
Dept: FAMILY MEDICINE | Facility: CLINIC | Age: 47
End: 2022-09-19
Payer: COMMERCIAL

## 2022-09-19 DIAGNOSIS — E11.8 DIABETES MELLITUS TYPE 2 WITH COMPLICATIONS: Primary | ICD-10-CM

## 2022-09-19 NOTE — TELEPHONE ENCOUNTER
Pls send labs to Dr Mary Carmen Larios [facility]    Also [facility] labs and fu with me in 6 months

## 2022-09-21 ENCOUNTER — PATIENT MESSAGE (OUTPATIENT)
Dept: FAMILY MEDICINE | Facility: CLINIC | Age: 47
End: 2022-09-21
Payer: COMMERCIAL

## 2022-09-22 ENCOUNTER — PATIENT MESSAGE (OUTPATIENT)
Dept: FAMILY MEDICINE | Facility: CLINIC | Age: 47
End: 2022-09-22

## 2022-09-22 ENCOUNTER — CLINICAL SUPPORT (OUTPATIENT)
Dept: FAMILY MEDICINE | Facility: CLINIC | Age: 47
End: 2022-09-22
Payer: COMMERCIAL

## 2022-09-22 PROCEDURE — 90471 IMMUNIZATION ADMIN: CPT | Mod: S$GLB,,, | Performed by: FAMILY MEDICINE

## 2022-09-22 PROCEDURE — 90686 IIV4 VACC NO PRSV 0.5 ML IM: CPT | Mod: S$GLB,,, | Performed by: FAMILY MEDICINE

## 2022-09-22 PROCEDURE — 90686 FLU VACCINE (QUAD) GREATER THAN OR EQUAL TO 3YO PRESERVATIVE FREE IM: ICD-10-PCS | Mod: S$GLB,,, | Performed by: FAMILY MEDICINE

## 2022-09-22 PROCEDURE — 90471 FLU VACCINE (QUAD) GREATER THAN OR EQUAL TO 3YO PRESERVATIVE FREE IM: ICD-10-PCS | Mod: S$GLB,,, | Performed by: FAMILY MEDICINE

## 2022-09-28 ENCOUNTER — PATIENT MESSAGE (OUTPATIENT)
Dept: FAMILY MEDICINE | Facility: CLINIC | Age: 47
End: 2022-09-28
Payer: COMMERCIAL

## 2022-10-18 ENCOUNTER — PATIENT MESSAGE (OUTPATIENT)
Dept: FAMILY MEDICINE | Facility: CLINIC | Age: 47
End: 2022-10-18
Payer: COMMERCIAL

## 2022-10-18 DIAGNOSIS — Z51.81 MEDICATION MONITORING ENCOUNTER: ICD-10-CM

## 2022-10-18 DIAGNOSIS — Z13.6 ENCOUNTER FOR SCREENING FOR CARDIOVASCULAR DISORDERS: Primary | ICD-10-CM

## 2022-10-25 ENCOUNTER — PATIENT MESSAGE (OUTPATIENT)
Dept: FAMILY MEDICINE | Facility: CLINIC | Age: 47
End: 2022-10-25
Payer: COMMERCIAL

## 2022-10-26 ENCOUNTER — IMMUNIZATION (OUTPATIENT)
Dept: PHARMACY | Facility: CLINIC | Age: 47
End: 2022-10-26
Payer: COMMERCIAL

## 2022-10-26 DIAGNOSIS — Z23 NEED FOR VACCINATION: Primary | ICD-10-CM

## 2022-10-28 RX ORDER — TRIAMCINOLONE ACETONIDE 1 MG/G
CREAM TOPICAL 2 TIMES DAILY
Qty: 60 G | Refills: 0 | Status: SHIPPED | OUTPATIENT
Start: 2022-10-28 | End: 2023-05-15

## 2022-10-29 ENCOUNTER — PATIENT MESSAGE (OUTPATIENT)
Dept: FAMILY MEDICINE | Facility: CLINIC | Age: 47
End: 2022-10-29
Payer: COMMERCIAL

## 2022-10-31 ENCOUNTER — HOSPITAL ENCOUNTER (OUTPATIENT)
Dept: RADIOLOGY | Facility: HOSPITAL | Age: 47
Discharge: HOME | End: 2022-10-31
Attending: FAMILY MEDICINE
Payer: COMMERCIAL

## 2022-10-31 DIAGNOSIS — Z13.6 ENCOUNTER FOR SCREENING FOR CARDIOVASCULAR DISORDERS: ICD-10-CM

## 2022-10-31 PROCEDURE — 75571 CT CALCIUM SCORING CARDIAC: ICD-10-PCS | Mod: 26,,, | Performed by: RADIOLOGY

## 2022-10-31 PROCEDURE — 75571 CT HRT W/O DYE W/CA TEST: CPT | Mod: TC,PO

## 2022-10-31 PROCEDURE — 75571 CT HRT W/O DYE W/CA TEST: CPT | Mod: 26,,, | Performed by: RADIOLOGY

## 2022-11-01 ENCOUNTER — PATIENT MESSAGE (OUTPATIENT)
Dept: FAMILY MEDICINE | Facility: CLINIC | Age: 47
End: 2022-11-01
Payer: COMMERCIAL

## 2022-11-03 RX ORDER — FLUCONAZOLE 150 MG/1
150 TABLET ORAL DAILY
Qty: 3 TABLET | Refills: 0 | Status: SHIPPED | OUTPATIENT
Start: 2022-11-03 | End: 2022-11-06

## 2022-12-13 ENCOUNTER — PATIENT MESSAGE (OUTPATIENT)
Dept: FAMILY MEDICINE | Facility: CLINIC | Age: 47
End: 2022-12-13
Payer: COMMERCIAL

## 2022-12-14 NOTE — TELEPHONE ENCOUNTER
Care Due:                  Date            Visit Type   Department     Provider  --------------------------------------------------------------------------------                                EP -                              PRIMARY      [facility]    Gina Rogelmaryana  Last Visit: 09-      CARE ([facility])   MEDICINE       Anger                              EP -                              PRIMARY      [facility]    Gina Rogelmaryana  Next Visit: 03-      CARE ([facility])   MEDICINE       Anger                                                            Last  Test          Frequency    Reason                     Performed    Due Date  --------------------------------------------------------------------------------    HBA1C.......  6 months...  OZEMPIC..................  09-   03-    [facility] Embedded Care Gaps. Reference number: [id number]. 12/14/2022   12:01:34 PM CST

## 2022-12-16 RX ORDER — SEMAGLUTIDE 1.34 MG/ML
1 INJECTION, SOLUTION SUBCUTANEOUS
Qty: 12 PEN | Refills: 0 | OUTPATIENT
Start: 2022-12-16 | End: 2023-12-16

## 2022-12-17 NOTE — ADDENDUM NOTE
Addended by: DEBORAH STUART on: 3/23/2017 12:25 PM     Modules accepted: Orders     Statement Selected

## 2022-12-19 ENCOUNTER — OFFICE VISIT (OUTPATIENT)
Dept: OBSTETRICS AND GYNECOLOGY | Facility: CLINIC | Age: 47
End: 2022-12-19
Payer: COMMERCIAL

## 2022-12-19 VITALS
SYSTOLIC BLOOD PRESSURE: 122 MMHG | BODY MASS INDEX: 30.88 KG/M2 | WEIGHT: 179.88 LBS | DIASTOLIC BLOOD PRESSURE: 90 MMHG

## 2022-12-19 DIAGNOSIS — N95.2 VAGINAL ATROPHY: ICD-10-CM

## 2022-12-19 DIAGNOSIS — Z01.419 WELL WOMAN EXAM: Primary | ICD-10-CM

## 2022-12-19 PROCEDURE — 3074F PR MOST RECENT SYSTOLIC BLOOD PRESSURE < 130 MM HG: ICD-10-PCS | Mod: CPTII,S$GLB,, | Performed by: STUDENT IN AN ORGANIZED HEALTH CARE EDUCATION/TRAINING PROGRAM

## 2022-12-19 PROCEDURE — 88175 CYTOPATH C/V AUTO FLUID REDO: CPT | Performed by: PATHOLOGY

## 2022-12-19 PROCEDURE — 3066F NEPHROPATHY DOC TX: CPT | Mod: CPTII,S$GLB,, | Performed by: STUDENT IN AN ORGANIZED HEALTH CARE EDUCATION/TRAINING PROGRAM

## 2022-12-19 PROCEDURE — 1159F PR MEDICATION LIST DOCUMENTED IN MEDICAL RECORD: ICD-10-PCS | Mod: CPTII,S$GLB,, | Performed by: STUDENT IN AN ORGANIZED HEALTH CARE EDUCATION/TRAINING PROGRAM

## 2022-12-19 PROCEDURE — 3080F PR MOST RECENT DIASTOLIC BLOOD PRESSURE >= 90 MM HG: ICD-10-PCS | Mod: CPTII,S$GLB,, | Performed by: STUDENT IN AN ORGANIZED HEALTH CARE EDUCATION/TRAINING PROGRAM

## 2022-12-19 PROCEDURE — 1159F MED LIST DOCD IN RCRD: CPT | Mod: CPTII,S$GLB,, | Performed by: STUDENT IN AN ORGANIZED HEALTH CARE EDUCATION/TRAINING PROGRAM

## 2022-12-19 PROCEDURE — 3008F PR BODY MASS INDEX (BMI) DOCUMENTED: ICD-10-PCS | Mod: CPTII,S$GLB,, | Performed by: STUDENT IN AN ORGANIZED HEALTH CARE EDUCATION/TRAINING PROGRAM

## 2022-12-19 PROCEDURE — 3044F HG A1C LEVEL LT 7.0%: CPT | Mod: CPTII,S$GLB,, | Performed by: STUDENT IN AN ORGANIZED HEALTH CARE EDUCATION/TRAINING PROGRAM

## 2022-12-19 PROCEDURE — 3008F BODY MASS INDEX DOCD: CPT | Mod: CPTII,S$GLB,, | Performed by: STUDENT IN AN ORGANIZED HEALTH CARE EDUCATION/TRAINING PROGRAM

## 2022-12-19 PROCEDURE — 3080F DIAST BP >= 90 MM HG: CPT | Mod: CPTII,S$GLB,, | Performed by: STUDENT IN AN ORGANIZED HEALTH CARE EDUCATION/TRAINING PROGRAM

## 2022-12-19 PROCEDURE — 3074F SYST BP LT 130 MM HG: CPT | Mod: CPTII,S$GLB,, | Performed by: STUDENT IN AN ORGANIZED HEALTH CARE EDUCATION/TRAINING PROGRAM

## 2022-12-19 PROCEDURE — 88141 PR  CYTOPATH CERV/VAG INTERPRET: ICD-10-PCS | Mod: ,,, | Performed by: PATHOLOGY

## 2022-12-19 PROCEDURE — 3061F PR NEG MICROALBUMINURIA RESULT DOCUMENTED/REVIEW: ICD-10-PCS | Mod: CPTII,S$GLB,, | Performed by: STUDENT IN AN ORGANIZED HEALTH CARE EDUCATION/TRAINING PROGRAM

## 2022-12-19 PROCEDURE — 99999 PR PBB SHADOW E&M-EST. PATIENT-LVL III: CPT | Mod: PBBFAC,,, | Performed by: STUDENT IN AN ORGANIZED HEALTH CARE EDUCATION/TRAINING PROGRAM

## 2022-12-19 PROCEDURE — 3044F PR MOST RECENT HEMOGLOBIN A1C LEVEL <7.0%: ICD-10-PCS | Mod: CPTII,S$GLB,, | Performed by: STUDENT IN AN ORGANIZED HEALTH CARE EDUCATION/TRAINING PROGRAM

## 2022-12-19 PROCEDURE — 3066F PR DOCUMENTATION OF TREATMENT FOR NEPHROPATHY: ICD-10-PCS | Mod: CPTII,S$GLB,, | Performed by: STUDENT IN AN ORGANIZED HEALTH CARE EDUCATION/TRAINING PROGRAM

## 2022-12-19 PROCEDURE — 87624 HPV HI-RISK TYP POOLED RSLT: CPT | Performed by: STUDENT IN AN ORGANIZED HEALTH CARE EDUCATION/TRAINING PROGRAM

## 2022-12-19 PROCEDURE — 99386 PR PREVENTIVE VISIT,NEW,40-64: ICD-10-PCS | Mod: S$GLB,,, | Performed by: STUDENT IN AN ORGANIZED HEALTH CARE EDUCATION/TRAINING PROGRAM

## 2022-12-19 PROCEDURE — 88141 CYTOPATH C/V INTERPRET: CPT | Mod: ,,, | Performed by: PATHOLOGY

## 2022-12-19 PROCEDURE — 99386 PREV VISIT NEW AGE 40-64: CPT | Mod: S$GLB,,, | Performed by: STUDENT IN AN ORGANIZED HEALTH CARE EDUCATION/TRAINING PROGRAM

## 2022-12-19 PROCEDURE — 3061F NEG MICROALBUMINURIA REV: CPT | Mod: CPTII,S$GLB,, | Performed by: STUDENT IN AN ORGANIZED HEALTH CARE EDUCATION/TRAINING PROGRAM

## 2022-12-19 PROCEDURE — 99999 PR PBB SHADOW E&M-EST. PATIENT-LVL III: ICD-10-PCS | Mod: PBBFAC,,, | Performed by: STUDENT IN AN ORGANIZED HEALTH CARE EDUCATION/TRAINING PROGRAM

## 2022-12-19 RX ORDER — ESTRADIOL 0.1 MG/G
1 CREAM VAGINAL DAILY
Qty: 42.5 G | Refills: 1 | Status: SHIPPED | OUTPATIENT
Start: 2022-12-19 | End: 2023-12-04

## 2022-12-19 NOTE — PROGRESS NOTES
History & Physical  Gynecology      SUBJECTIVE:     Chief Complaint: Establish Care and Annual Exam       History of Present Illness:    Here today for well woman. Having some vaginal dryness and low libido.    Gyn: regular periods, no hx of abnormal paps. Sexually active. Vasectomy for BC. No immediate FH of gyn or colon cancer    No tobacco     Review of patient's allergies indicates:   Allergen Reactions    Eggs [egg derived] Anaphylaxis       Past Medical History:   Diagnosis Date    YAIR positive     Diabetes     Fatty liver     GERD (gastroesophageal reflux disease)     Hypertension     Sleep apnea     uses CPAP     Past Surgical History:   Procedure Laterality Date    BELT ABDOMINOPLASTY       SECTION      x 2    COLONOSCOPY N/A 2022    Procedure: COLONOSCOPY;  Surgeon: Ran Dickerson MD;  Location: [facility];  Service: Endoscopy;  Laterality: N/A;    NASAL SEPTUM SURGERY       OB History          2    Para   2    Term   2            AB        Living             SAB        IAB        Ectopic        Multiple        Live Births                   Family History   Problem Relation Age of Onset    Ovarian cancer Maternal Grandmother     Hypertension Mother     Allergic rhinitis Neg Hx     Allergies Neg Hx     Angioedema Neg Hx     Asthma Neg Hx     Atopy Neg Hx     Eczema Neg Hx     Immunodeficiency Neg Hx     Rhinitis Neg Hx     Urticaria Neg Hx     Breast cancer Neg Hx     Uterine cancer Neg Hx     Cervical cancer Neg Hx      Social History     Tobacco Use    Smoking status: Never    Smokeless tobacco: Never   Substance Use Topics    Alcohol use: No     Comment: socially    Drug use: No       Current Outpatient Medications   Medication Sig    ascorbic acid, vitamin C, (VITAMIN C) 1000 MG tablet Take 1,000 mg by mouth once daily.    biotin 10 mg Tab Take by mouth.    blood sugar diagnostic Strp 1 each by Misc.(Non-Drug; Combo Route) route once daily.    blood-glucose meter (BLOOD  GLUCOSE MONITORING) kit Use as instructed    clonazePAM (KLONOPIN) 0.5 MG tablet Take 1 tablet (0.5 mg total) by mouth daily as needed for Anxiety. (Patient not taking: Reported on 9/8/2022)    cyanocobalamin (VITAMIN B-12) 100 MCG tablet Take 100 mcg by mouth once daily.    EPINEPHrine (EPIPEN) 0.3 mg/0.3 mL AtIn Inject 0.3 mLs (0.3 mg total) into the muscle once. for 1 dose    ergocalciferol, vitamin D2, (VITAMIN D ORAL) Take by mouth.    finasteride (PROSCAR) 5 mg tablet Take 5 mg by mouth once daily.    fluticasone (FLONASE) 50 mcg/actuation nasal spray 2 sprays by Each Nare route once daily. (Patient taking differently: 2 sprays by Each Nostril route daily as needed.)    metFORMIN (GLUCOPHAGE) 500 MG tablet TAKE 1 TABLET BY MOUTH EVERY DAY WITH BREAKFAST    ondansetron (ZOFRAN) 8 MG tablet Take 1 tablet (8 mg total) by mouth every 8 (eight) hours as needed for Nausea.    OZEMPIC 1 mg/dose (4 mg/3 mL) INJECT 1 MG INTO THE SKIN EVERY 7 DAYS.    spironolactone (ALDACTONE) 50 MG tablet Take 50 mg by mouth once daily.    triamcinolone acetonide 0.1% (KENALOG) 0.1 % cream Apply topically 2 (two) times daily. X 10 - 14 days     No current facility-administered medications for this visit.         Review of Systems:  Review of Systems   Constitutional:  Negative for chills, fatigue and fever.   HENT:  Negative for congestion.    Eyes:  Negative for visual disturbance.   Respiratory:  Negative for cough and shortness of breath.    Cardiovascular:  Negative for chest pain and palpitations.   Gastrointestinal:  Negative for abdominal distention, abdominal pain, constipation, diarrhea, nausea and vomiting.   Genitourinary:  Negative for difficulty urinating, dysuria, hematuria, vaginal bleeding and vaginal discharge.   Skin:  Negative for rash.   Neurological:  Negative for dizziness, seizures, light-headedness and headaches.   Hematological:  Does not bruise/bleed easily.   Psychiatric/Behavioral:  Negative for dysphoric  mood. The patient is not nervous/anxious.       OBJECTIVE:     Physical Exam:  Physical Exam  Vitals reviewed.   Constitutional:       General: She is not in acute distress.     Appearance: Normal appearance. She is well-developed.   HENT:      Head: Normocephalic and atraumatic.   Cardiovascular:      Rate and Rhythm: Normal rate and regular rhythm.   Pulmonary:      Effort: Pulmonary effort is normal.   Chest:   Breasts:     Right: No inverted nipple, mass, nipple discharge, skin change or tenderness.      Left: No inverted nipple, mass, nipple discharge, skin change or tenderness.   Abdominal:      General: There is no distension.      Palpations: Abdomen is soft.      Tenderness: There is no abdominal tenderness.   Genitourinary:     Vagina: Normal.      Comments: Normal external female genitalia, normal hair distribution. Vaginal mucosa pink, moist, well rugated, scant white physiologic discharge. No blood in vault. Cervix pink, non-friable, without lesion. No CMT. Uterus non tender, mobile, not enlarged. Adnexa without fullness or tenderness.    Skin:     General: Skin is warm.   Neurological:      Mental Status: She is alert and oriented to person, place, and time.   Psychiatric:         Behavior: Behavior normal.         Thought Content: Thought content normal.         Judgment: Judgment normal.         ASSESSMENT:       ICD-10-CM ICD-9-CM    1. Well woman exam  Z01.419 V72.31 Liquid-Based Pap Smear, Screening      HPV High Risk Genotypes, PCR          Orders Placed This Encounter   Procedures    HPV High Risk Genotypes, PCR     Order Specific Question:   Source     Answer:   Cervix     Order Specific Question:   Release to patient     Answer:   Immediate           Plan:      - Pap cotest today   - MMG due 2023   - colonoscopy up to date  - tobacco cessation n/a  - contraception vasectomy   - HPV vaccine n/a  - Safe Sex n/a  - Counseled to take daily multivitamin. If patient is of reproductive age and not on  "contraception, to take prenatal vitamin. Patient has been counseled on the vitamin D and calcium requirements per ACOG recommendations.  Age   Calcium(mg/day)   Vitamin D (IU/day)  9-18    1300                       600  19-50  1000                       600  51-70  1200                       600  >70      1,200                      800  Patient to start multivitamin    CC: decreased libido  HPI: lately been having decreased sex drive. Still able to have sex and enjoy it. Not painful but a little dry. Difficulty achieving orgasm like she used to.   - Long discussion with patient about normal sex drive as we age. Discussed sex drive in women is usually multifactorial with social and other psychological stressors. Discussed eliminating stressors or acknowledging that sometimes we need to stop "thinking about everything else" to get into mood for sex-this is normal for women. We discussed being able to orgasm alone and how patient can still enjoy sex. If sex is painful- this can create a cycle where we don't want to have sex because of associated pain. We can figure out what is causing pain. We can work on vaginal dryness with certain medications-discussed this with patient. Discussed party analogy with party, if can still go and have a good time, all that matters.  Discussed planning sex can sometimes help and lead to increase desire. Discussed Anjuke marylu, erotica, sex therapy. Depending on situation recommended certain books including menopuase manifesto, come as you are, etc. We discussed transdermal testosterone and side effect profile. Discussed needs update EKG/lipid profile before starting testosterone for sex drive. Discussed other HRT can sometimes help with libido as well. All questions answered.   - vaginal estrogen cream called in  - hold off on transdermal testosterone    Liz Austin M.D.  Obstetrics and Gynecology              "

## 2022-12-26 ENCOUNTER — PATIENT MESSAGE (OUTPATIENT)
Dept: FAMILY MEDICINE | Facility: CLINIC | Age: 47
End: 2022-12-26
Payer: COMMERCIAL

## 2022-12-27 RX ORDER — SEMAGLUTIDE 1.34 MG/ML
1 INJECTION, SOLUTION SUBCUTANEOUS
Qty: 12 PEN | Refills: 0 | Status: SHIPPED | OUTPATIENT
Start: 2022-12-27 | End: 2023-02-16

## 2022-12-27 NOTE — TELEPHONE ENCOUNTER
No new care gaps identified.  [facility] Embedded Care Gaps. Reference number: [id number]. 12/27/2022   10:17:38 AM CST

## 2022-12-28 LAB
HPV HR 12 DNA SPEC QL NAA+PROBE: NEGATIVE
HPV16 AG SPEC QL: NEGATIVE
HPV18 DNA SPEC QL NAA+PROBE: NEGATIVE

## 2022-12-30 LAB
FINAL PATHOLOGIC DIAGNOSIS: ABNORMAL
Lab: ABNORMAL

## 2023-01-04 ENCOUNTER — PATIENT MESSAGE (OUTPATIENT)
Dept: OBSTETRICS AND GYNECOLOGY | Facility: CLINIC | Age: 48
End: 2023-01-04
Payer: COMMERCIAL

## 2023-02-15 ENCOUNTER — OFFICE VISIT (OUTPATIENT)
Dept: URGENT CARE | Facility: CLINIC | Age: 48
End: 2023-02-15
Payer: COMMERCIAL

## 2023-02-15 VITALS
SYSTOLIC BLOOD PRESSURE: 143 MMHG | RESPIRATION RATE: 16 BRPM | DIASTOLIC BLOOD PRESSURE: 100 MMHG | HEIGHT: 64 IN | TEMPERATURE: 98 F | HEART RATE: 96 BPM | OXYGEN SATURATION: 99 % | BODY MASS INDEX: 30.56 KG/M2 | WEIGHT: 179 LBS

## 2023-02-15 DIAGNOSIS — R05.1 ACUTE COUGH: ICD-10-CM

## 2023-02-15 DIAGNOSIS — B34.9 VIRAL ILLNESS: ICD-10-CM

## 2023-02-15 DIAGNOSIS — J02.9 SORE THROAT: ICD-10-CM

## 2023-02-15 DIAGNOSIS — R51.9 NONINTRACTABLE HEADACHE, UNSPECIFIED CHRONICITY PATTERN, UNSPECIFIED HEADACHE TYPE: Primary | ICD-10-CM

## 2023-02-15 LAB
CTP QC/QA: YES
MOLECULAR STREP A: NEGATIVE
POC MOLECULAR INFLUENZA A AGN: NEGATIVE
POC MOLECULAR INFLUENZA B AGN: NEGATIVE
SARS-COV-2 AG RESP QL IA.RAPID: NEGATIVE

## 2023-02-15 PROCEDURE — 1160F RVW MEDS BY RX/DR IN RCRD: CPT | Mod: CPTII,S$GLB,, | Performed by: NURSE PRACTITIONER

## 2023-02-15 PROCEDURE — 1160F PR REVIEW ALL MEDS BY PRESCRIBER/CLIN PHARMACIST DOCUMENTED: ICD-10-PCS | Mod: CPTII,S$GLB,, | Performed by: NURSE PRACTITIONER

## 2023-02-15 PROCEDURE — 87651 POCT STREP A MOLECULAR: ICD-10-PCS | Mod: QW,S$GLB,, | Performed by: NURSE PRACTITIONER

## 2023-02-15 PROCEDURE — 3077F PR MOST RECENT SYSTOLIC BLOOD PRESSURE >= 140 MM HG: ICD-10-PCS | Mod: CPTII,S$GLB,, | Performed by: NURSE PRACTITIONER

## 2023-02-15 PROCEDURE — 87502 POCT INFLUENZA A/B MOLECULAR: ICD-10-PCS | Mod: QW,S$GLB,, | Performed by: NURSE PRACTITIONER

## 2023-02-15 PROCEDURE — 99214 OFFICE O/P EST MOD 30 MIN: CPT | Mod: S$GLB,,, | Performed by: NURSE PRACTITIONER

## 2023-02-15 PROCEDURE — 87502 INFLUENZA DNA AMP PROBE: CPT | Mod: QW,S$GLB,, | Performed by: NURSE PRACTITIONER

## 2023-02-15 PROCEDURE — 3080F DIAST BP >= 90 MM HG: CPT | Mod: CPTII,S$GLB,, | Performed by: NURSE PRACTITIONER

## 2023-02-15 PROCEDURE — 3080F PR MOST RECENT DIASTOLIC BLOOD PRESSURE >= 90 MM HG: ICD-10-PCS | Mod: CPTII,S$GLB,, | Performed by: NURSE PRACTITIONER

## 2023-02-15 PROCEDURE — 1159F PR MEDICATION LIST DOCUMENTED IN MEDICAL RECORD: ICD-10-PCS | Mod: CPTII,S$GLB,, | Performed by: NURSE PRACTITIONER

## 2023-02-15 PROCEDURE — 3077F SYST BP >= 140 MM HG: CPT | Mod: CPTII,S$GLB,, | Performed by: NURSE PRACTITIONER

## 2023-02-15 PROCEDURE — 1159F MED LIST DOCD IN RCRD: CPT | Mod: CPTII,S$GLB,, | Performed by: NURSE PRACTITIONER

## 2023-02-15 PROCEDURE — 87811 SARS CORONAVIRUS 2 ANTIGEN POCT, MANUAL READ: ICD-10-PCS | Mod: QW,S$GLB,, | Performed by: NURSE PRACTITIONER

## 2023-02-15 PROCEDURE — 3008F BODY MASS INDEX DOCD: CPT | Mod: CPTII,S$GLB,, | Performed by: NURSE PRACTITIONER

## 2023-02-15 PROCEDURE — 87651 STREP A DNA AMP PROBE: CPT | Mod: QW,S$GLB,, | Performed by: NURSE PRACTITIONER

## 2023-02-15 PROCEDURE — 99214 PR OFFICE/OUTPT VISIT, EST, LEVL IV, 30-39 MIN: ICD-10-PCS | Mod: S$GLB,,, | Performed by: NURSE PRACTITIONER

## 2023-02-15 PROCEDURE — 3008F PR BODY MASS INDEX (BMI) DOCUMENTED: ICD-10-PCS | Mod: CPTII,S$GLB,, | Performed by: NURSE PRACTITIONER

## 2023-02-15 PROCEDURE — 87811 SARS-COV-2 COVID19 W/OPTIC: CPT | Mod: QW,S$GLB,, | Performed by: NURSE PRACTITIONER

## 2023-02-15 RX ORDER — PROMETHAZINE HYDROCHLORIDE AND DEXTROMETHORPHAN HYDROBROMIDE 6.25; 15 MG/5ML; MG/5ML
5 SYRUP ORAL EVERY 4 HOURS PRN
Qty: 100 ML | Refills: 0 | Status: SHIPPED | OUTPATIENT
Start: 2023-02-15 | End: 2023-02-25

## 2023-02-15 NOTE — PATIENT INSTRUCTIONS
Please be consistent with taking your blood pressure medications. Keep a diary of your BP at home.   Symptomatic treatment: (consider the following unless you are allergic or cannot take the following suggestions)  Alternate Tylenol (not to exceed 4000 mg per 24 hours) and Ibuprofen (400 mg every 3 hours) for pain and fever   * do not take tylenol if you have liver disease or issues with your liver   *do not take motrin if you have kidney disease or on blood thinners   Xyzal, Zyrtec, Allegra, or Claritin during the day for allergy relief, dry up extra mucus   mucinex will thin secretions and promote drainage. This can help with sinus pressure/pain or sinus headaches (you may have increase post nasal drip or runny nose when you take this medication).   For a sore throat try salt water gargles and honey/lemon water to soothe throat  Cepachol spray helps to numb the discomfort in throat  Elderberry to reduce duration of URI symptoms and boost your immune system  Warm face compresses/hot showers as often as you can to open sinuses   Vicks vapor rub at night  Flonase or Nasacort (nasal steroid over the counter, 1-2 squirts to each nare) can help with nasal inflammation and congestion  Nasal saline spray reduces inflammation and dryness  Stay hydrated with increased water intake and simple foods like chicken noodle soup help hydrate and soothe the throat  Drink pedialyte, gatorade or propel.   Delsym helps with coughing at night  Benadryl at night may help if allergy component- do not use with phenergan because both medications can make you drowsy)  You can try breathe right strips at night to help you breathe  A cool mist humidifier in bedroom may help with cough and relieve stuffy nose.   Zantac will help if there is reflux from the post nasal drip  REST as much as you can    Sinus rinses DO NOT USE TAP WATER, if you must, water must be a rolling boil for 1 minute, let it cool, then use.  May use distilled water, or  over the counter nasal saline rinses.  Vics vapor rub in shower to help open nasal passages.  May use nasal gel to keep passages moisturized.  May use Nasal saline sprays during the day for added relief of congestion.   For those who go to the gym, please do not use the sauna or steam room now to clear sinuses.    During pollen season, change shirt if you are outside for a while when you go in.  Also wash your face.  Do not touch your face with your hands.  Wash your hands often in general while ill, avoid face contact with hands.     Your symptoms will likely last 5-7 days, maybe longer depending on how it affects your body.  You are contagious 5-7, so minimize contact with others to reduce the spread to others and stay home from work or school as we discussed. Dehydration is preventable but is one of the main reasons why you will feel so badly.  Antibiotics are not needed unless a complication (such as Otitis Media, Bacterial sinus infection or pneumonia). Taking antibiotics for Flu/Cold is not supported by evidence based medicine and can expose you to unnecessary side effects of the medication, such as anaphylaxis.   If you experience any:  Chest pain, shortness of breath, wheezing or difficulty breathing  Severe headache, face, neck or ear pain  New rash  Fever over 101.5º F (38.6 C) for more than three days  Confusion, behavior change or seizure  Severe weakness or dizziness  Go to ER

## 2023-02-15 NOTE — PROGRESS NOTES
"Subjective:       Patient ID: Ana Dorantes is a 47 y.o. female.    Vitals:  height is 5' 4" (1.626 m) and weight is 81.2 kg (179 lb). Her temperature is 97.9 °F (36.6 °C). Her blood pressure is 143/100 (abnormal) and her pulse is 96. Her respiration is 16 and oxygen saturation is 99%.     Chief Complaint: Headache    Pt presents with headache, chills, cough,body aches, and fatigue x 4 days. Pt is Covid and flu vacs with known covid exposure. OTC taken with moderate relief and pain scale is 6/10.    Headache   This is a new problem. The current episode started in the past 7 days. The problem occurs constantly. The problem has been gradually worsening. The pain is located in the Frontal region. The pain does not radiate. The pain quality is not similar to prior headaches. The quality of the pain is described as throbbing. The pain is at a severity of 6/10. The pain is moderate. Associated symptoms include coughing and a fever. Nothing aggravates the symptoms. She has tried NSAIDs for the symptoms. The treatment provided moderate relief. Her past medical history is significant for hypertension.     Constitution: Positive for chills and fever.   HENT:  Positive for congestion.    Respiratory:  Positive for cough.    Musculoskeletal:  Positive for muscle ache.   Neurological:  Positive for headaches.     Objective:      Physical Exam   Constitutional: She is oriented to person, place, and time. She appears well-developed. She is cooperative.   HENT:   Head: Normocephalic and atraumatic.   Ears:   Right Ear: Hearing, tympanic membrane, external ear and ear canal normal.   Left Ear: Hearing, tympanic membrane, external ear and ear canal normal.   Nose: Nose normal. No mucosal edema or nasal deformity. No epistaxis. Right sinus exhibits no maxillary sinus tenderness and no frontal sinus tenderness. Left sinus exhibits no maxillary sinus tenderness and no frontal sinus tenderness.   Mouth/Throat: Uvula is midline, " oropharynx is clear and moist and mucous membranes are normal. Mucous membranes are moist. No trismus in the jaw. Normal dentition. No uvula swelling.   Eyes: Conjunctivae and lids are normal. Pupils are equal, round, and reactive to light.   Neck: Trachea normal and phonation normal. Neck supple.   Cardiovascular: Normal rate, regular rhythm, normal heart sounds and normal pulses.   Pulmonary/Chest: Effort normal and breath sounds normal.   Abdominal: Normal appearance and bowel sounds are normal. Soft.   Musculoskeletal: Normal range of motion.         General: Normal range of motion.   Neurological: She is alert and oriented to person, place, and time. She exhibits normal muscle tone.   Skin: Skin is warm, dry and intact.   Psychiatric: Her speech is normal and behavior is normal. Judgment and thought content normal.   Nursing note and vitals reviewed.      Assessment:       1. Nonintractable headache, unspecified chronicity pattern, unspecified headache type    2. Sore throat    3. Viral illness            Plan:         Nonintractable headache, unspecified chronicity pattern, unspecified headache type  -     SARS Coronavirus 2 Antigen, POCT Manual Read  -     POCT Influenza A/B MOLECULAR    Sore throat  -     POCT Influenza A/B MOLECULAR  -     POCT Strep A, Molecular    Viral illness

## 2023-02-16 RX ORDER — SEMAGLUTIDE 1.34 MG/ML
INJECTION, SOLUTION SUBCUTANEOUS
Qty: 3 PEN | Refills: 0 | Status: SHIPPED | OUTPATIENT
Start: 2023-02-16 | End: 2023-05-12

## 2023-02-16 NOTE — TELEPHONE ENCOUNTER
Refill Decision Note   Ana Dorantes  is requesting a refill authorization.  Brief Assessment and Rationale for Refill:  Approve     Medication Therapy Plan:       Medication Reconciliation Completed: No   Comments:     Provider Staff:     Action is required for this patient.   Please see care gap opportunities below in Care Due Message.     Thanks!  [facility]     Appointments      Date Provider   Last Visit   9/8/2022 Gina Barrios MD   Next Visit   3/17/2023 Gina Barrios MD     Note composed:1:30 PM 02/16/2023           Note composed:1:30 PM 02/16/2023

## 2023-02-16 NOTE — TELEPHONE ENCOUNTER
Care Due:                  Date            Visit Type   Department     Provider  --------------------------------------------------------------------------------                                EP -                              PRIMARY      [facility]    Gina Rogelmaryana  Last Visit: 09-      CARE ([facility])   MEDICINE       Anger                              EP -                              PRIMARY      [facility]    Gina Marte Sophie  Next Visit: 03-      CARE ([facility])   MEDICINE       Anger                                                            Last  Test          Frequency    Reason                     Performed    Due Date  --------------------------------------------------------------------------------    HBA1C.......  6 months...  semaglutide..............  09-   03-    [facility] Embedded Care Gaps. Reference number: [id number]. 2/16/2023   12:32:53 PM CST

## 2023-03-29 DIAGNOSIS — E11.9 TYPE 2 DIABETES MELLITUS WITHOUT COMPLICATION: ICD-10-CM

## 2023-04-05 ENCOUNTER — PATIENT MESSAGE (OUTPATIENT)
Dept: FAMILY MEDICINE | Facility: CLINIC | Age: 48
End: 2023-04-05
Payer: COMMERCIAL

## 2023-04-11 ENCOUNTER — PATIENT MESSAGE (OUTPATIENT)
Dept: ADMINISTRATIVE | Facility: HOSPITAL | Age: 48
End: 2023-04-11
Payer: COMMERCIAL

## 2023-04-27 ENCOUNTER — TELEPHONE (OUTPATIENT)
Dept: FAMILY MEDICINE | Facility: CLINIC | Age: 48
End: 2023-04-27
Payer: COMMERCIAL

## 2023-04-27 DIAGNOSIS — F41.9 ANXIETY: Primary | ICD-10-CM

## 2023-04-27 NOTE — TELEPHONE ENCOUNTER
----- Message from Yash Varner sent at 4/26/2023 10:59 AM CDT -----  Regarding: pt call bk  Name of Who is Calling:RAQUEL CRAWFORD [[id number]]        What is the request in detail: Pt requesting referral see therapist please advise            Can the clinic reply by [facility]: yes         What Number [facility]: Telephone Information:  Mobile          [phone]

## 2023-04-27 NOTE — TELEPHONE ENCOUNTER
Pt states that you referred her previously for therapy for anxiety.  She was not ready to go talk to anyone then but now she would like to see someone.

## 2023-05-01 ENCOUNTER — TELEPHONE (OUTPATIENT)
Dept: PSYCHIATRY | Facility: CLINIC | Age: 48
End: 2023-05-01
Payer: COMMERCIAL

## 2023-05-01 ENCOUNTER — PATIENT MESSAGE (OUTPATIENT)
Dept: PSYCHIATRY | Facility: CLINIC | Age: 48
End: 2023-05-01
Payer: COMMERCIAL

## 2023-05-12 ENCOUNTER — LAB VISIT (OUTPATIENT)
Dept: LAB | Facility: HOSPITAL | Age: 48
End: 2023-05-12
Attending: FAMILY MEDICINE
Payer: COMMERCIAL

## 2023-05-12 DIAGNOSIS — E11.9 TYPE 2 DIABETES MELLITUS WITHOUT COMPLICATION: ICD-10-CM

## 2023-05-12 DIAGNOSIS — E11.8 DIABETES MELLITUS TYPE 2 WITH COMPLICATIONS: ICD-10-CM

## 2023-05-12 LAB
ALBUMIN SERPL BCP-MCNC: 2.9 G/DL (ref 3.5–5.2)
ALP SERPL-CCNC: 62 U/L (ref 55–135)
ALT SERPL W/O P-5'-P-CCNC: 16 U/L (ref 10–44)
ANION GAP SERPL CALC-SCNC: 9 MMOL/L (ref 8–16)
AST SERPL-CCNC: 18 U/L (ref 10–40)
BILIRUB SERPL-MCNC: 0.2 MG/DL (ref 0.1–1)
BUN SERPL-MCNC: 9 MG/DL (ref 6–20)
CALCIUM SERPL-MCNC: 8.6 MG/DL (ref 8.7–10.5)
CHLORIDE SERPL-SCNC: 104 MMOL/L (ref 95–110)
CHOLEST SERPL-MCNC: 148 MG/DL (ref 120–199)
CHOLEST/HDLC SERPL: 2.9 {RATIO} (ref 2–5)
CO2 SERPL-SCNC: 24 MMOL/L (ref 23–29)
CREAT SERPL-MCNC: 0.7 MG/DL (ref 0.5–1.4)
EST. GFR  (NO RACE VARIABLE): >60 ML/MIN/1.73 M^2
ESTIMATED AVG GLUCOSE: 114 MG/DL (ref 68–131)
GLUCOSE SERPL-MCNC: 146 MG/DL (ref 70–110)
HBA1C MFR BLD: 5.6 % (ref 4–5.6)
HDLC SERPL-MCNC: 51 MG/DL (ref 40–75)
HDLC SERPL: 34.5 % (ref 20–50)
LDLC SERPL CALC-MCNC: 78.6 MG/DL (ref 63–159)
NONHDLC SERPL-MCNC: 97 MG/DL
POTASSIUM SERPL-SCNC: 4.5 MMOL/L (ref 3.5–5.1)
PROT SERPL-MCNC: 6.5 G/DL (ref 6–8.4)
SODIUM SERPL-SCNC: 137 MMOL/L (ref 136–145)
TRIGL SERPL-MCNC: 92 MG/DL (ref 30–150)

## 2023-05-12 PROCEDURE — 83036 HEMOGLOBIN GLYCOSYLATED A1C: CPT | Performed by: FAMILY MEDICINE

## 2023-05-12 PROCEDURE — 36415 COLL VENOUS BLD VENIPUNCTURE: CPT | Mod: PN | Performed by: FAMILY MEDICINE

## 2023-05-12 PROCEDURE — 80053 COMPREHEN METABOLIC PANEL: CPT | Performed by: FAMILY MEDICINE

## 2023-05-12 PROCEDURE — 80061 LIPID PANEL: CPT | Performed by: FAMILY MEDICINE

## 2023-05-14 ENCOUNTER — PATIENT MESSAGE (OUTPATIENT)
Dept: FAMILY MEDICINE | Facility: CLINIC | Age: 48
End: 2023-05-14
Payer: COMMERCIAL

## 2023-05-15 ENCOUNTER — HOSPITAL ENCOUNTER (OUTPATIENT)
Dept: RADIOLOGY | Facility: HOSPITAL | Age: 48
Discharge: HOME | End: 2023-05-15
Attending: FAMILY MEDICINE
Payer: COMMERCIAL

## 2023-05-15 ENCOUNTER — OFFICE VISIT (OUTPATIENT)
Dept: FAMILY MEDICINE | Facility: CLINIC | Age: 48
End: 2023-05-15
Payer: COMMERCIAL

## 2023-05-15 VITALS
HEIGHT: 64 IN | WEIGHT: 176.06 LBS | BODY MASS INDEX: 30.06 KG/M2 | OXYGEN SATURATION: 100 % | HEART RATE: 75 BPM | DIASTOLIC BLOOD PRESSURE: 86 MMHG | SYSTOLIC BLOOD PRESSURE: 130 MMHG | TEMPERATURE: 98 F | RESPIRATION RATE: 20 BRPM

## 2023-05-15 DIAGNOSIS — Z12.39 ENCOUNTER FOR SCREENING FOR MALIGNANT NEOPLASM OF BREAST, UNSPECIFIED SCREENING MODALITY: ICD-10-CM

## 2023-05-15 DIAGNOSIS — E11.59 HYPERTENSION ASSOCIATED WITH DIABETES: ICD-10-CM

## 2023-05-15 DIAGNOSIS — E11.8 DIABETES MELLITUS TYPE 2 WITH COMPLICATIONS: ICD-10-CM

## 2023-05-15 DIAGNOSIS — Z00.00 ANNUAL PHYSICAL EXAM: Primary | ICD-10-CM

## 2023-05-15 DIAGNOSIS — I15.2 HYPERTENSION ASSOCIATED WITH DIABETES: ICD-10-CM

## 2023-05-15 DIAGNOSIS — E66.9 OBESITY (BMI 30.0-34.9): ICD-10-CM

## 2023-05-15 PROCEDURE — 3008F PR BODY MASS INDEX (BMI) DOCUMENTED: ICD-10-PCS | Mod: CPTII,S$GLB,, | Performed by: FAMILY MEDICINE

## 2023-05-15 PROCEDURE — 77067 MAMMO DIGITAL SCREENING BILAT WITH TOMO: ICD-10-PCS | Mod: 26,,, | Performed by: RADIOLOGY

## 2023-05-15 PROCEDURE — 77067 SCR MAMMO BI INCL CAD: CPT | Mod: TC,PO

## 2023-05-15 PROCEDURE — 3079F PR MOST RECENT DIASTOLIC BLOOD PRESSURE 80-89 MM HG: ICD-10-PCS | Mod: CPTII,S$GLB,, | Performed by: FAMILY MEDICINE

## 2023-05-15 PROCEDURE — 1160F RVW MEDS BY RX/DR IN RCRD: CPT | Mod: CPTII,S$GLB,, | Performed by: FAMILY MEDICINE

## 2023-05-15 PROCEDURE — 3079F DIAST BP 80-89 MM HG: CPT | Mod: CPTII,S$GLB,, | Performed by: FAMILY MEDICINE

## 2023-05-15 PROCEDURE — 3008F BODY MASS INDEX DOCD: CPT | Mod: CPTII,S$GLB,, | Performed by: FAMILY MEDICINE

## 2023-05-15 PROCEDURE — 99396 PR PREVENTIVE VISIT,EST,40-64: ICD-10-PCS | Mod: S$GLB,,, | Performed by: FAMILY MEDICINE

## 2023-05-15 PROCEDURE — 77063 MAMMO DIGITAL SCREENING BILAT WITH TOMO: ICD-10-PCS | Mod: 26,,, | Performed by: RADIOLOGY

## 2023-05-15 PROCEDURE — 3044F PR MOST RECENT HEMOGLOBIN A1C LEVEL <7.0%: ICD-10-PCS | Mod: CPTII,S$GLB,, | Performed by: FAMILY MEDICINE

## 2023-05-15 PROCEDURE — 99396 PREV VISIT EST AGE 40-64: CPT | Mod: S$GLB,,, | Performed by: FAMILY MEDICINE

## 2023-05-15 PROCEDURE — 3044F HG A1C LEVEL LT 7.0%: CPT | Mod: CPTII,S$GLB,, | Performed by: FAMILY MEDICINE

## 2023-05-15 PROCEDURE — 1160F PR REVIEW ALL MEDS BY PRESCRIBER/CLIN PHARMACIST DOCUMENTED: ICD-10-PCS | Mod: CPTII,S$GLB,, | Performed by: FAMILY MEDICINE

## 2023-05-15 PROCEDURE — 77063 BREAST TOMOSYNTHESIS BI: CPT | Mod: 26,,, | Performed by: RADIOLOGY

## 2023-05-15 PROCEDURE — 1159F PR MEDICATION LIST DOCUMENTED IN MEDICAL RECORD: ICD-10-PCS | Mod: CPTII,S$GLB,, | Performed by: FAMILY MEDICINE

## 2023-05-15 PROCEDURE — 3075F PR MOST RECENT SYSTOLIC BLOOD PRESS GE 130-139MM HG: ICD-10-PCS | Mod: CPTII,S$GLB,, | Performed by: FAMILY MEDICINE

## 2023-05-15 PROCEDURE — 1159F MED LIST DOCD IN RCRD: CPT | Mod: CPTII,S$GLB,, | Performed by: FAMILY MEDICINE

## 2023-05-15 PROCEDURE — 77067 SCR MAMMO BI INCL CAD: CPT | Mod: 26,,, | Performed by: RADIOLOGY

## 2023-05-15 PROCEDURE — 3075F SYST BP GE 130 - 139MM HG: CPT | Mod: CPTII,S$GLB,, | Performed by: FAMILY MEDICINE

## 2023-05-15 RX ORDER — SEMAGLUTIDE 2.68 MG/ML
2 INJECTION, SOLUTION SUBCUTANEOUS
Qty: 3 ML | Refills: 1 | Status: SHIPPED | OUTPATIENT
Start: 2023-05-15 | End: 2023-05-30

## 2023-05-15 RX ORDER — ONDANSETRON HYDROCHLORIDE 8 MG/1
8 TABLET, FILM COATED ORAL EVERY 8 HOURS PRN
Qty: 30 TABLET | Refills: 1 | Status: SHIPPED | OUTPATIENT
Start: 2023-05-15

## 2023-05-15 NOTE — PROGRESS NOTES
Subjective:       Patient ID: Ana Dorantes is a 47 y.o. female.    Chief Complaint: Follow-up and Annual Exam    HPI  The patient is a 47-year-old who is here today for her annual exam and for follow-up.  Overall, she is doing well.  She does have some challenges with her mother who has dementia.  She has decided to do some special things with her mom's such as taking her mom home to see her brother and taking her mom to [place] to visit her 's grave site.  The patient is trying to stay as active as possible.  She is trying to eat healthy.  She recently had labs which reviewed today.  She is due for Prevnar which she would be willing to receive.  Her last colonoscopy was in .  She is due for her mammogram would like to get this scheduled.    Today we also discussed the followin)  Diabetes.  Her recent A1c was 5.7.  She is currently taking Glucophage and Ozempic.  She does have an upcoming eye exam.  2) obesity with a BMI of 30.22.  She finds that weight loss has plateaued.  She is tolerating the Ozempic well but feels that it is not suppressing her appetite like it used to.  She would be interested in trying a slightly higher dose of Ozempic.  3)  history of elevated LFTs fatty liver disease.  Her recent LFTs were normal.  Her AST was 18 (down from prior value of 66) and her ALT was 16 (down from prior value of 48)    4) Hypertension.  Today blood pressure is 130/86.  She is taking Aldactone which is also being used to help address hair loss        Review of Systems   Constitutional:  Negative for appetite change, chills, diaphoresis, fatigue, fever and unexpected weight change.   HENT:  Negative for congestion, dental problem, ear pain, hearing loss, postnasal drip, rhinorrhea, sinus pressure, sneezing, sore throat and trouble swallowing.    Eyes:  Negative for photophobia, pain, discharge and visual disturbance.   Respiratory:  Negative for cough, chest tightness, shortness of breath  and wheezing.    Cardiovascular:  Negative for chest pain, palpitations and leg swelling.   Gastrointestinal:  Negative for abdominal distention, abdominal pain, blood in stool, constipation, diarrhea, nausea and vomiting.   Endocrine: Negative for cold intolerance, heat intolerance, polydipsia and polyuria.   Genitourinary:  Negative for dysuria, flank pain, frequency, genital sores, hematuria, menstrual problem and vaginal discharge.   Musculoskeletal:  Negative for arthralgias, joint swelling and myalgias.   Skin:  Negative for rash.   Neurological:  Negative for dizziness, syncope, light-headedness and headaches.   Hematological:  Negative for adenopathy. Does not bruise/bleed easily.   Psychiatric/Behavioral:  Negative for dysphoric mood, self-injury, sleep disturbance and suicidal ideas. The patient is not nervous/anxious.        Objective:      Physical Exam  Constitutional:       General: She is not in acute distress.     Appearance: Normal appearance. She is well-developed.   HENT:      Head: Normocephalic and atraumatic.      Right Ear: Hearing, tympanic membrane, ear canal and external ear normal.      Left Ear: Hearing, tympanic membrane, ear canal and external ear normal.      Nose: Nose normal.      Mouth/Throat:      Mouth: No oral lesions.      Pharynx: No oropharyngeal exudate or posterior oropharyngeal erythema.   Eyes:      General: Lids are normal. No scleral icterus.     Extraocular Movements: Extraocular movements intact.      Conjunctiva/sclera: Conjunctivae normal.      Pupils: Pupils are equal, round, and reactive to light.   Neck:      Thyroid: No thyroid mass or thyromegaly.      Vascular: No carotid bruit.   Cardiovascular:      Rate and Rhythm: Normal rate and regular rhythm. No extrasystoles are present.     Chest Wall: PMI is not displaced.      Pulses:           Dorsalis pedis pulses are 2+ on the right side and 2+ on the left side.        Posterior tibial pulses are 2+ on the right  "side and 2+ on the left side.      Heart sounds: Normal heart sounds. No murmur heard.    No gallop.   Pulmonary:      Effort: Pulmonary effort is normal. No accessory muscle usage or respiratory distress.      Breath sounds: Normal breath sounds.   Abdominal:      General: Bowel sounds are normal. There is no abdominal bruit.      Palpations: Abdomen is soft.      Tenderness: There is no abdominal tenderness. There is no rebound.   Musculoskeletal:      Cervical back: Normal range of motion and neck supple.      Right foot: No deformity.      Left foot: No deformity.   Feet:      Right foot:      Protective Sensation: 10 sites tested.  10 sites sensed.      Skin integrity: Warmth present. No ulcer or callus.      Left foot:      Protective Sensation: 10 sites tested.  10 sites sensed.      Skin integrity: Warmth present. No ulcer or callus.   Lymphadenopathy:      Head:      Right side of head: No submental or submandibular adenopathy.      Left side of head: No submental or submandibular adenopathy.      Cervical:      Right cervical: No superficial, deep or posterior cervical adenopathy.     Left cervical: No superficial, deep or posterior cervical adenopathy.      Upper Body:      Right upper body: No supraclavicular adenopathy.      Left upper body: No supraclavicular adenopathy.   Skin:     General: Skin is warm and dry.   Neurological:      Mental Status: She is alert and oriented to person, place, and time.      Cranial Nerves: No cranial nerve deficit.      Sensory: No sensory deficit.   Psychiatric:         Speech: Speech normal.         Behavior: Behavior normal.         Thought Content: Thought content normal.     Blood pressure 130/86, pulse 75, temperature 98.2 °F (36.8 °C), resp. rate 20, height 5' 4" (1.626 m), weight 79.9 kg (176 lb 0.6 oz), last menstrual period 05/04/2023, SpO2 100 %.Body mass index is 30.22 kg/m².            A/P:  1) annual exam health maintenance issues and anticipatory guidance " issues were discussed.  She will continue to stay physically active and consume a healthy diet.  We will schedule her mammogram.  We will administer Prevnar today  2)  Diabetes.  Well controlled.  Continue with Glucophage and Ozempic.  She will have the eye doctor send me a copy of her diabetic eye exam.  We will check an A1c and urine microalbumin in 6 months.  2) obesity with a BMI of 30.22.  Improved but persistent.  We will increase Ozempic to 2 mg once a week.  She will let me know how she is doing with this higher dose in 1 month.  She did ask for Zofran in case she has nausea with the higher dose and I did provide a prescription for this  3)  history of elevated LFTs fatty liver disease.  Improved.  We will continue to monitor her LFTs.  Continue weight loss will be beneficial  4) Hypertension.  Well controlled.  Continue with Aldactone    As long as she does well, I will see her back in 6 months with A1c prior to that visit

## 2023-05-17 ENCOUNTER — PATIENT MESSAGE (OUTPATIENT)
Dept: FAMILY MEDICINE | Facility: CLINIC | Age: 48
End: 2023-05-17
Payer: COMMERCIAL

## 2023-05-19 LAB
LEFT EYE DM RETINOPATHY: NEGATIVE
RIGHT EYE DM RETINOPATHY: NEGATIVE

## 2023-05-24 ENCOUNTER — PATIENT OUTREACH (OUTPATIENT)
Dept: ADMINISTRATIVE | Facility: HOSPITAL | Age: 48
End: 2023-05-24
Payer: COMMERCIAL

## 2023-05-24 NOTE — PROGRESS NOTES
Population Health Chart Review & Patient Outreach Details:     Reason for Outreach Encounter:     []  Non-Compliant Report   []  Payor Report ([employer], PHN, BCBS, MSSP, MCIP, UHC, etc.)   []  Pre-Visit Chart Review   []  Weekly Bulk Order Report- Order placed      Updates Requested / Reviewed:     []  Care Everywhere    []     []  External Sources ([facility], [facility], DIS, LINKS (updated Immunizations) , etc.)   []  Care Team Updated    Patient Outreach Method:    []  Telephone Outreach Completed   [] Successful   [] Left Voicemail   [] Unable to Contact (wrong number, no voicemail)  []  [facility] Outreach Sent  []  Letter Outreach Mailed  []  Fax Sent for External Records  [x]  External Records Upload Only    Health Maintenance Topics Addressed and Outreach Outcomes / Actions Taken:        []      Breast Cancer Screening []  Mammo Scheduled      []  External Records Requested/External Records Uploaded     []  Patient Declined     []  Patient Will Call Back to Schedule     []  Patient Will Schedule with External Provider / Order Routed if Applicable  MAMMOGRAM ORDERED             []       Cervical Cancer Screening []  Pap Scheduled      []  External Records Requested/External Records Uploaded     []  Patient Declined     []  Patient Will Call Back to Schedule     []  Patient Will Schedule with External Provider               []          Colorectal Cancer Screening []  Colonoscopy Case Request or Referral Placed/FIT KIT ORDERED     []  External Records Requested/External Records Uploaded     []  Patient Declined     []  Patient Will Call Back to Schedule     []  Patient Will Schedule with External Provider     []  Fit Kit Mailed (add the SmartPhrase under additional notes)     []  Reminded Patient to Complete Home Test             [x]      Diabetic Eye Exam []  Eye Camera Scheduled or Optometry Referral Placed     []  External Records Requested/External Records Uploaded     []  Patient Declined     []   Patient Will Call Back to Schedule     []  Patient Will Schedule with External Provider             []      Blood Pressure Control []  Primary Care Follow Up Visit Scheduled     []  Remote Blood Pressure Reading Captured     []  Patient Declined     []  Patient Will Call Back / Patient Will Send Portal Message with Reading     []  Patient Will Call Back to Schedule Provider Visit             []       HbA1c & Other Labs []  Lab Appt Scheduled for Due Labs/External Records Uploaded     []  Primary Care Follow Up Visit Scheduled      []  Reminded Patient to Complete Home Test     []  Patient Declined     []  Patient Will Call Back to Schedule     []  Patient Will Schedule with External Provider / Order Routed if Applicable  LABS ORDERED           []    Schedule Primary Care Appt []  Primary Care Appt Scheduled     []  Patient Declined     []  Patient Will Call Back to Schedule     []  Pt Established with External Provider & Updated Care Team             []      Medication Adherence []  Primary Care Appointment Scheduled     []  Reminder Added to Upcoming Primary Care Appt Notes     []  Patient Reminded to  Prescription     []  Patient Declined, Provider Notified if Needed     []  Sent Provider Message to Review and/or Add Exclusion to Problem List             []      Osteoporosis Screening []  DXA Appointment Scheduled     []  External Records Requested/External Records Uploaded     []  Patient Declined     []  Patient Will Call Back to Schedule     []    Patient Will Schedule with External Provider / Order Routed if Applicable  DEXA ORDERED     Additional Care Coordinator Notes:         Further Action Needed If Patient Returns Outreach:

## 2023-10-06 ENCOUNTER — IMMUNIZATION (OUTPATIENT)
Dept: FAMILY MEDICINE | Facility: CLINIC | Age: 48
End: 2023-10-06
Payer: COMMERCIAL

## 2023-10-06 PROCEDURE — G0008 ADMIN INFLUENZA VIRUS VAC: HCPCS | Mod: S$GLB,,, | Performed by: FAMILY MEDICINE

## 2023-10-06 PROCEDURE — 90686 FLU VACCINE (QUAD) GREATER THAN OR EQUAL TO 3YO PRESERVATIVE FREE IM: ICD-10-PCS | Mod: S$GLB,,, | Performed by: FAMILY MEDICINE

## 2023-10-06 PROCEDURE — 90686 IIV4 VACC NO PRSV 0.5 ML IM: CPT | Mod: S$GLB,,, | Performed by: FAMILY MEDICINE

## 2023-10-06 PROCEDURE — G0008 FLU VACCINE (QUAD) GREATER THAN OR EQUAL TO 3YO PRESERVATIVE FREE IM: ICD-10-PCS | Mod: S$GLB,,, | Performed by: FAMILY MEDICINE

## 2023-12-04 ENCOUNTER — OFFICE VISIT (OUTPATIENT)
Dept: FAMILY MEDICINE | Facility: CLINIC | Age: 48
End: 2023-12-04
Payer: COMMERCIAL

## 2023-12-04 VITALS
WEIGHT: 170.88 LBS | OXYGEN SATURATION: 97 % | HEIGHT: 65 IN | RESPIRATION RATE: 20 BRPM | BODY MASS INDEX: 28.47 KG/M2 | DIASTOLIC BLOOD PRESSURE: 86 MMHG | SYSTOLIC BLOOD PRESSURE: 126 MMHG | HEART RATE: 83 BPM | TEMPERATURE: 98 F

## 2023-12-04 DIAGNOSIS — R53.83 FATIGUE, UNSPECIFIED TYPE: ICD-10-CM

## 2023-12-04 DIAGNOSIS — E11.8 DIABETES MELLITUS TYPE 2 WITH COMPLICATIONS: Primary | ICD-10-CM

## 2023-12-04 DIAGNOSIS — K76.0 FATTY LIVER: ICD-10-CM

## 2023-12-04 PROCEDURE — 3074F SYST BP LT 130 MM HG: CPT | Mod: CPTII,S$GLB,, | Performed by: FAMILY MEDICINE

## 2023-12-04 PROCEDURE — 2023F PR DILATED RETINAL EXAM W/O EVID OF RETINOPATHY: ICD-10-PCS | Mod: CPTII,S$GLB,, | Performed by: FAMILY MEDICINE

## 2023-12-04 PROCEDURE — 2023F DILAT RTA XM W/O RTNOPTHY: CPT | Mod: CPTII,S$GLB,, | Performed by: FAMILY MEDICINE

## 2023-12-04 PROCEDURE — 80053 COMPREHEN METABOLIC PANEL: CPT | Performed by: FAMILY MEDICINE

## 2023-12-04 PROCEDURE — 3044F PR MOST RECENT HEMOGLOBIN A1C LEVEL <7.0%: ICD-10-PCS | Mod: CPTII,S$GLB,, | Performed by: FAMILY MEDICINE

## 2023-12-04 PROCEDURE — 1160F PR REVIEW ALL MEDS BY PRESCRIBER/CLIN PHARMACIST DOCUMENTED: ICD-10-PCS | Mod: CPTII,S$GLB,, | Performed by: FAMILY MEDICINE

## 2023-12-04 PROCEDURE — 82043 UR ALBUMIN QUANTITATIVE: CPT | Performed by: FAMILY MEDICINE

## 2023-12-04 PROCEDURE — 1159F MED LIST DOCD IN RCRD: CPT | Mod: CPTII,S$GLB,, | Performed by: FAMILY MEDICINE

## 2023-12-04 PROCEDURE — 90471 IMMUNIZATION ADMIN: CPT | Mod: S$GLB,,, | Performed by: FAMILY MEDICINE

## 2023-12-04 PROCEDURE — 83036 HEMOGLOBIN GLYCOSYLATED A1C: CPT | Performed by: FAMILY MEDICINE

## 2023-12-04 PROCEDURE — 90715 TDAP VACCINE 7 YRS/> IM: CPT | Mod: S$GLB,,, | Performed by: FAMILY MEDICINE

## 2023-12-04 PROCEDURE — 3079F PR MOST RECENT DIASTOLIC BLOOD PRESSURE 80-89 MM HG: ICD-10-PCS | Mod: CPTII,S$GLB,, | Performed by: FAMILY MEDICINE

## 2023-12-04 PROCEDURE — 3008F BODY MASS INDEX DOCD: CPT | Mod: CPTII,S$GLB,, | Performed by: FAMILY MEDICINE

## 2023-12-04 PROCEDURE — 1159F PR MEDICATION LIST DOCUMENTED IN MEDICAL RECORD: ICD-10-PCS | Mod: CPTII,S$GLB,, | Performed by: FAMILY MEDICINE

## 2023-12-04 PROCEDURE — 85025 COMPLETE CBC W/AUTO DIFF WBC: CPT | Performed by: FAMILY MEDICINE

## 2023-12-04 PROCEDURE — 3008F PR BODY MASS INDEX (BMI) DOCUMENTED: ICD-10-PCS | Mod: CPTII,S$GLB,, | Performed by: FAMILY MEDICINE

## 2023-12-04 PROCEDURE — 90715 TDAP VACCINE GREATER THAN OR EQUAL TO 7YO IM: ICD-10-PCS | Mod: S$GLB,,, | Performed by: FAMILY MEDICINE

## 2023-12-04 PROCEDURE — 1160F RVW MEDS BY RX/DR IN RCRD: CPT | Mod: CPTII,S$GLB,, | Performed by: FAMILY MEDICINE

## 2023-12-04 PROCEDURE — 99214 PR OFFICE/OUTPT VISIT, EST, LEVL IV, 30-39 MIN: ICD-10-PCS | Mod: 25,S$GLB,, | Performed by: FAMILY MEDICINE

## 2023-12-04 PROCEDURE — 84443 ASSAY THYROID STIM HORMONE: CPT | Performed by: FAMILY MEDICINE

## 2023-12-04 PROCEDURE — 90471 TDAP VACCINE GREATER THAN OR EQUAL TO 7YO IM: ICD-10-PCS | Mod: S$GLB,,, | Performed by: FAMILY MEDICINE

## 2023-12-04 PROCEDURE — 3044F HG A1C LEVEL LT 7.0%: CPT | Mod: CPTII,S$GLB,, | Performed by: FAMILY MEDICINE

## 2023-12-04 PROCEDURE — 99214 OFFICE O/P EST MOD 30 MIN: CPT | Mod: 25,S$GLB,, | Performed by: FAMILY MEDICINE

## 2023-12-04 PROCEDURE — 3074F PR MOST RECENT SYSTOLIC BLOOD PRESSURE < 130 MM HG: ICD-10-PCS | Mod: CPTII,S$GLB,, | Performed by: FAMILY MEDICINE

## 2023-12-04 PROCEDURE — 3079F DIAST BP 80-89 MM HG: CPT | Mod: CPTII,S$GLB,, | Performed by: FAMILY MEDICINE

## 2023-12-04 RX ORDER — ROSUVASTATIN CALCIUM 10 MG/1
10 TABLET, COATED ORAL DAILY
Qty: 90 TABLET | Refills: 0 | Status: SHIPPED | OUTPATIENT
Start: 2023-12-04 | End: 2024-12-03

## 2023-12-04 NOTE — PATIENT INSTRUCTIONS
Belching & Flatus  - Can continue Bean-o as directed on packaging or OTC simethicone as directed on packaging  -recommend low gas diet: Reduce or eliminate these foods from your diet: Broccoli, Cauliflower, [place] sprouts, Cabbage, Cooked dried beans, Carbonated beverages (sparkling water, soda, beer, champagne)    Other Causes Of Excess Gas Include:   1) EATING TOO FAST or TALKING WHILE YOU CHEW may cause you to swallow air. This increases the amount of gas in the stomach and may worsen your symptoms.      Chew each mouthful completely before swallowing. Take your time.  2) OVEREATING may increase the feeling of being bloated and cause more gas.      When you are full, stop eating.  3) CONSTIPATION can increase the amount of normal intestinal gas.            Avoid constipation by increasing the amount of fiber in your diet by including whole cereal grains, fresh vegetables (except those in the above list) and fresh fruits. High-fiber foods absorb                 water and carry it out of the body. When increasing the amount of fiber in your diet, you also need to increase the amount of water that you drink. You should drink at least eight 8-ounce              glasses of water (two quarts) per day.

## 2023-12-04 NOTE — PROGRESS NOTES
Subjective:       Patient ID: Ana Dorantes is a 48 y.o. female.    Chief Complaint: Follow-up    HPI  The patient is a 48-year-old who is here today for follow-up.    Today we discussed followin) gassy.  She has been gassy for the past 2 months.  She is having belching and flatus.  She has wondered if she may be lactose intolerant.  2) fatigue.  She has been extremely tired over the past couple of months.  There has been a lot going on.  She does have GIULIA and is using her CPAP consistently.  She does feel rested in the morning when she wakes up.  3) diabetes.  She missed her A1c and urine microalbumin appointment and would like to get that rescheduled.  She is taking her Glucophage and Ozempic.  Last time she checked her fasting blood sugar was 132  4)  History of obesity.  She continues to do well with the Ozempic.  She has lost 6 lb since our last visit in May.  Her BMI today is 28.43.          Review of Systems   Constitutional:  Positive for fatigue. Negative for appetite change, chills, diaphoresis, fever and unexpected weight change.   HENT:  Negative for congestion, ear pain, postnasal drip, rhinorrhea, sinus pressure, sneezing, sore throat and trouble swallowing.    Eyes:  Negative for pain, discharge and visual disturbance.   Respiratory:  Negative for cough, chest tightness, shortness of breath and wheezing.    Cardiovascular:  Negative for chest pain, palpitations and leg swelling.   Gastrointestinal:  Negative for abdominal distention, abdominal pain, blood in stool, constipation, diarrhea, nausea and vomiting.        Per HPI   Skin:  Negative for rash.       Objective:      Physical Exam  Constitutional:       General: She is not in acute distress.     Appearance: Normal appearance. She is well-developed.   HENT:      Head: Normocephalic and atraumatic.      Right Ear: Hearing, tympanic membrane, ear canal and external ear normal.      Left Ear: Hearing, tympanic membrane, ear canal and  "external ear normal.      Nose: Nose normal.      Mouth/Throat:      Mouth: No oral lesions.      Pharynx: No oropharyngeal exudate or posterior oropharyngeal erythema.   Eyes:      General: Lids are normal. No scleral icterus.     Extraocular Movements: Extraocular movements intact.      Conjunctiva/sclera: Conjunctivae normal.      Pupils: Pupils are equal, round, and reactive to light.   Neck:      Thyroid: No thyroid mass or thyromegaly.      Vascular: No carotid bruit.   Cardiovascular:      Rate and Rhythm: Normal rate and regular rhythm. No extrasystoles are present.     Chest Wall: PMI is not displaced.      Heart sounds: Normal heart sounds. No murmur heard.     No gallop.   Pulmonary:      Effort: Pulmonary effort is normal. No accessory muscle usage or respiratory distress.      Breath sounds: Normal breath sounds.   Abdominal:      General: Bowel sounds are normal. There is no abdominal bruit.      Palpations: Abdomen is soft.      Tenderness: There is no abdominal tenderness. There is no rebound.   Musculoskeletal:      Cervical back: Normal range of motion and neck supple.   Lymphadenopathy:      Head:      Right side of head: No submental or submandibular adenopathy.      Left side of head: No submental or submandibular adenopathy.      Cervical:      Right cervical: No superficial, deep or posterior cervical adenopathy.     Left cervical: No superficial, deep or posterior cervical adenopathy.      Upper Body:      Right upper body: No supraclavicular adenopathy.      Left upper body: No supraclavicular adenopathy.   Skin:     General: Skin is warm and dry.   Neurological:      Mental Status: She is alert and oriented to person, place, and time.   Blood pressure 126/86, pulse 83, temperature 98.4 °F (36.9 °C), resp. rate 20, height 5' 5" (1.651 m), weight 77.5 kg (170 lb 13.7 oz), last menstrual period 11/16/2023, SpO2 97 %.Body mass index is 28.43 kg/m².            A/P:  1) belching and flatus.  New. "  It is possible that this is from her metformin as 12% of people with metformin will experience this.  We are going to hold her metformin and see if this improves.  If her symptoms persist after she hold her metformin for 2 weeks, she will let me know  2) fatigue.  New.  We will check labs today.  3) diabetes.  Status unknown.  We will check an A1c and urine microalbumin today.  For now we are going to hold her Glucophage but continue with the Ozempic.  We will check an A1c again in 3 months to see how she is doing off of the metformin.  Given her diabetes and cardiovascular risk scores, I did recommend she start a statin and she would be agreeable.  We are going to start her on Crestor  4)  History of obesity now with a BMI of 28.43.  She will continue her efforts with weight loss.  We will continue with the Ozempic  5)   GIULIA.  Continue with CPAP   6) history of fatty liver disease.  We will recheck LFTs again in 3 months  7)  health maintenance issues.  She will receive Tdap today.  Her mammogram and colonoscopy are up-to-date      As long as she does well, I will see her back in 6 months with labs prior to that visit

## 2023-12-05 LAB
ALBUMIN SERPL BCP-MCNC: 3.5 G/DL (ref 3.5–5.2)
ALBUMIN/CREAT UR: 5.4 UG/MG (ref 0–30)
ALP SERPL-CCNC: 56 U/L (ref 55–135)
ALT SERPL W/O P-5'-P-CCNC: 17 U/L (ref 10–44)
ANION GAP SERPL CALC-SCNC: 10 MMOL/L (ref 8–16)
AST SERPL-CCNC: 20 U/L (ref 10–40)
BASOPHILS # BLD AUTO: 0.04 K/UL (ref 0–0.2)
BASOPHILS NFR BLD: 0.5 % (ref 0–1.9)
BILIRUB SERPL-MCNC: 0.4 MG/DL (ref 0.1–1)
BUN SERPL-MCNC: 12 MG/DL (ref 6–20)
CALCIUM SERPL-MCNC: 8.7 MG/DL (ref 8.7–10.5)
CHLORIDE SERPL-SCNC: 109 MMOL/L (ref 95–110)
CO2 SERPL-SCNC: 22 MMOL/L (ref 23–29)
CREAT SERPL-MCNC: 0.7 MG/DL (ref 0.5–1.4)
CREAT UR-MCNC: 185 MG/DL (ref 15–325)
DIFFERENTIAL METHOD: ABNORMAL
EOSINOPHIL # BLD AUTO: 0.1 K/UL (ref 0–0.5)
EOSINOPHIL NFR BLD: 1.9 % (ref 0–8)
ERYTHROCYTE [DISTWIDTH] IN BLOOD BY AUTOMATED COUNT: 14.5 % (ref 11.5–14.5)
EST. GFR  (NO RACE VARIABLE): >60 ML/MIN/1.73 M^2
ESTIMATED AVG GLUCOSE: 103 MG/DL (ref 68–131)
GLUCOSE SERPL-MCNC: 94 MG/DL (ref 70–110)
HBA1C MFR BLD: 5.2 % (ref 4–5.6)
HCT VFR BLD AUTO: 39.5 % (ref 37–48.5)
HGB BLD-MCNC: 12.4 G/DL (ref 12–16)
IMM GRANULOCYTES # BLD AUTO: 0.02 K/UL (ref 0–0.04)
IMM GRANULOCYTES NFR BLD AUTO: 0.3 % (ref 0–0.5)
LYMPHOCYTES # BLD AUTO: 2.3 K/UL (ref 1–4.8)
LYMPHOCYTES NFR BLD: 31.1 % (ref 18–48)
MCH RBC QN AUTO: 29.7 PG (ref 27–31)
MCHC RBC AUTO-ENTMCNC: 31.4 G/DL (ref 32–36)
MCV RBC AUTO: 95 FL (ref 82–98)
MICROALBUMIN UR DL<=1MG/L-MCNC: 10 UG/ML
MONOCYTES # BLD AUTO: 0.6 K/UL (ref 0.3–1)
MONOCYTES NFR BLD: 8.1 % (ref 4–15)
NEUTROPHILS # BLD AUTO: 4.3 K/UL (ref 1.8–7.7)
NEUTROPHILS NFR BLD: 58.1 % (ref 38–73)
NRBC BLD-RTO: 0 /100 WBC
PLATELET # BLD AUTO: 299 K/UL (ref 150–450)
PMV BLD AUTO: 11.2 FL (ref 9.2–12.9)
POTASSIUM SERPL-SCNC: 4.2 MMOL/L (ref 3.5–5.1)
PROT SERPL-MCNC: 7.6 G/DL (ref 6–8.4)
RBC # BLD AUTO: 4.18 M/UL (ref 4–5.4)
SODIUM SERPL-SCNC: 141 MMOL/L (ref 136–145)
TSH SERPL DL<=0.005 MIU/L-ACNC: 2.18 UIU/ML (ref 0.4–4)
WBC # BLD AUTO: 7.37 K/UL (ref 3.9–12.7)

## 2023-12-08 ENCOUNTER — PATIENT MESSAGE (OUTPATIENT)
Dept: FAMILY MEDICINE | Facility: CLINIC | Age: 48
End: 2023-12-08
Payer: COMMERCIAL

## 2024-01-28 DIAGNOSIS — E11.8 DIABETES MELLITUS TYPE 2 WITH COMPLICATIONS: ICD-10-CM

## 2024-01-29 RX ORDER — SEMAGLUTIDE 2.68 MG/ML
2 INJECTION, SOLUTION SUBCUTANEOUS
Qty: 9 ML | Refills: 1 | Status: SHIPPED | OUTPATIENT
Start: 2024-01-29 | End: 2024-06-10 | Stop reason: SDUPTHER

## 2024-01-29 NOTE — TELEPHONE ENCOUNTER
Refill Decision Note   Ana Dorantes  is requesting a refill authorization.  Brief Assessment and Rationale for Refill:  Approve     Medication Therapy Plan:         Comments:     Note composed:10:53 AM 01/29/2024

## 2024-01-29 NOTE — TELEPHONE ENCOUNTER
No care due was identified.  Health [facility] Embedded Care Due Messages. Reference number: [id number].   1/28/2024 10:22:26 PM CST

## 2024-02-06 ENCOUNTER — PATIENT MESSAGE (OUTPATIENT)
Dept: PSYCHIATRY | Facility: CLINIC | Age: 49
End: 2024-02-06
Payer: COMMERCIAL

## 2024-02-07 ENCOUNTER — TELEPHONE (OUTPATIENT)
Dept: PSYCHIATRY | Facility: CLINIC | Age: 49
End: 2024-02-07
Payer: COMMERCIAL

## 2024-03-01 ENCOUNTER — OFFICE VISIT (OUTPATIENT)
Dept: PSYCHIATRY | Facility: CLINIC | Age: 49
End: 2024-03-01
Payer: COMMERCIAL

## 2024-03-01 DIAGNOSIS — F43.23 ADJUSTMENT DISORDER WITH MIXED ANXIETY AND DEPRESSED MOOD: ICD-10-CM

## 2024-03-01 DIAGNOSIS — F90.0 ATTENTION DEFICIT HYPERACTIVITY DISORDER (ADHD), PREDOMINANTLY INATTENTIVE TYPE: Primary | ICD-10-CM

## 2024-03-01 PROCEDURE — 90791 PSYCH DIAGNOSTIC EVALUATION: CPT | Mod: 95,,, | Performed by: PSYCHOLOGIST

## 2024-03-01 NOTE — PROGRESS NOTES
Primary Care Behavioral Health: Initial  Patient Name: Ana Dorantes  Date:  03/01/2024  Site:  [facility]  Referral source: Gina Barrios MD    The patient location is:  [address] [place] [address] [place] [address]  The patient phone number is: [phone]  Visit type: Virtual visit with synchronous audio and video  Each patient to whom he or she provides medical services by telemedicine is:  (1) informed of the relationship between the physician and patient and the respective role of any other health care provider with respect to management of the patient; and (2) notified that he or she may decline to receive medical services by telemedicine and may withdraw from such care at any time.    Chief complaint/reason for encounter:  Anxiety, Caregiver Stress    History of present illness:  Ms. Ana Dorantes is a 48 y.o. White female referred by Gina Barrios MD.  Patient was seen, examined and chart was reviewed.  Patient notes her Mother is 93 years old. Patient notes she is an only child and she is the primary caretaker of her Mother. Patient notes she quit her job to care for her Mother full-time. 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 Patient notes when her Father passed away when she was 3 years old. Patient notes they were residing in [place] and at the age of 8 years old they moved to [place]. Patient notes her Mother has always resided with her. Patient notes when she Patient notes she has 2 adult children 27 and 32 years old. Patient notes her children had been living with her and are in the process of buying a home. Patient notes she is experiencing some sleep difficulty. Patient notes she is currently in her last semester of education for  services. Patient notes she would like to obtain a real estate license.    Patient notes a hx of ADHD and was treated with medication in [id number]. Patient notes when she finished  school she stopped taking medication for many years. Patient notes since returning to school symptoms have exacerbated. Patient notes she experiences difficulty with attention/concentration, difficulty remaining on task when talking or explaining. Patient notes she has to consciously attempt to stay focused when someone is speaking. Patient notes she is very disorganized. Patient notes she has difficulty with starting a task and not completing it. Patient notes she has difficulty with maintain focus on one thing at a time. Patient notes she constantly has to rewind a TV show because she is distracted thinking or doing something else. Patient notes she procrastinates when she does not want to do something. Patient notes she developed strategies for management of symptoms.     Past Medical History:   Diagnosis Date    YAIR positive     Diabetes     Fatty liver     GERD (gastroesophageal reflux disease)     Hypertension     Sleep apnea     uses CPAP         Current Outpatient Medications:     blood sugar diagnostic Strp, 1 each by Misc.(Non-Drug; Combo Route) route once daily., Disp: 30 strip, Rfl: 6    blood-glucose meter (BLOOD GLUCOSE MONITORING) kit, Use as instructed, Disp: 1 each, Rfl: 0    EPINEPHrine (EPIPEN) 0.3 mg/0.3 mL AtIn, Inject 0.3 mLs (0.3 mg total) into the muscle once. for 1 dose (Patient not taking: Reported on 12/4/2023), Disp: 2 Device, Rfl: 0    ergocalciferol, vitamin D2, (VITAMIN D ORAL), Take by mouth., Disp: , Rfl:     finasteride (PROSCAR) 5 mg tablet, Take 5 mg by mouth once daily., Disp: , Rfl:     fluticasone (FLONASE) 50 mcg/actuation nasal spray, 2 sprays by Each Nare route once daily. (Patient taking differently: 2 sprays by Each Nostril route daily as needed.), Disp: 1 Bottle, Rfl: 11    metFORMIN (GLUCOPHAGE) 500 MG tablet, Take 1 tablet (500 mg total) by mouth once daily., Disp: 90 tablet, Rfl: 1    ondansetron (ZOFRAN) 8 MG tablet, Take 1 tablet (8 mg total) by mouth every 8 (eight)  hours as needed for Nausea., Disp: 30 tablet, Rfl: 1    rosuvastatin (CRESTOR) 10 MG tablet, Take 1 tablet (10 mg total) by mouth once daily., Disp: 90 tablet, Rfl: 0    semaglutide (OZEMPIC) 2 mg/dose (8 mg/3 mL) PnIj, Inject 2 mg into the skin every 7 days., Disp: 9 mL, Rfl: 1    Psychiatric history:  Previous diagnosis: ADHD  Previous medications:Patient notes she had been prescribed an anxiolytic in the past however notes she rarely took this. Patient notes hx of being prescribed medication for ADHD in the past well.  Previous hospitalizations: Patient denies.  History of outpatient treatment: Patient notes she underwent an ADHD evaluation 10 years ago.  Previous suicide attempt:  Patient denies.  Family history of psychiatric illness:  Daughter: Depression; Mother: Patient notes her Mother presents with depressive symptoms but has never diagnosed or treated.    Current and past substance use:  Alcohol:  Patient notes she drinks 1 drink per week. Denied history of abuse or dependency.  Drugs:  Denied current use.  Denied history of abuse or dependency.  Tobacco:  Denied current use.  Caffeine:  1 cup of coffee per day    Psychiatric symptoms:  Depression:  Patient endorses symptoms of  down mood, fatigue, sleep difficulty, difficulty with attention/concentration, feeling bad about herself. She denied suicidal ideation.  Ewa/Hypomania:  Denied.  She denied periods of elevated mood or abnormally increased energy or goal-directed activity.  Anxiety:  Patient endorses symptoms of nervousness, restlessness, worry  Thoughts:  Denied delusions, hallucinations.  Suicidal thoughts/behaviors: Patient denied suicidal and homicidal ideation, plan and intent.  Patient noted agreement to call 911/and or present to the ED if she experienced suicidal or homicidal ideation, plan or intent.  Patient denies owning a firearm.  Self-injury:  Patient denies.  Sleep: Patient notes she is experiencing sleep difficulty. Patient notes  she has been waking in the night after feeling warm and wakes up for approximately 45 minutes and then attempts to go back to sleep. Patient notes at times she might feel anxious when she wakes up in the night.   Trauma: Patient notes her Father passed away when she was 3 years old.     Mental Status Exam:  General appearance:  appears stated age, neatly dressed, well groomed  Speech:  Clear and intelligible, normal rate, normal tone, normal pitch, normal volume  Level of cooperation:  cooperative  Thought processes:  Linear, logical, goal-directed  Mood:  Generally euthymic  Thought content:  Relevant and appropriate  Affect:  appropriate and congruent to mood  Orientation:  oriented to person, place, situation and date  Memory/Attention/Concentration:  No gross cognitive deficits made evident during conversation.  Judgment and insight: fair  Language:  intact        3/1/2024     9:31 AM 2/26/2024     9:14 PM   Results of the PHQ8   Little interest or pleasure in doing things Not at all Not at all   Feeling down, depressed, or hopeless Several days Several days   Trouble falling or staying asleep, or sleeping too much Several days Several days   Feeling tired or having little energy More than half the days More than half the days   Poor appetite or overeating Not at all Not at all   Feeling bad about yourself - or that you are a failure or have let yourself or your family down More than half the days More than half the days   Trouble concentrating on things, such as reading the newspaper or watching television Nearly every day Nearly every day   Moving or speaking so slowly that other people could have noticed. Or the opposite - being so fidgety or restless that you have been moving around a lot more than usual More than half the days More than half the days   Total Score  11 11             3/1/2024     9:31 AM 2/26/2024     9:11 PM   GAD7   1. Feeling nervous, anxious, or on edge? 1 1   2. Not being able to stop  or control worrying? 1 1   3. Worrying too much about different things? 1 1   4. Trouble relaxing? 1 1   5. Being so restless that it is hard to sit still? 1 1   6. Becoming easily annoyed or irritable? 1 1   7. Feeling afraid as if something awful might happen? 1 1   KARTHIK-7 Score 7 7    7     Impression: Patient presents today noting some hx of experiencing mild symptoms of anxiety, particularly during periods of increased stress. Patient describes some recent symptoms of anxiety and depression which at this time appear primarily related to stress, particularly caring for her aging mother. Patient also expresses a hx of ADH and notes she has not been treated in many years. Patient notes she has returned to school and has found concentrating difficult. Patient notes for many years she has used behavioral strategies for management of symptoms but acknowledged these are only mildly helpful.     Diagnosis:  1. Attention deficit hyperactivity disorder (ADHD), predominantly inattentive type        2. Adjustment disorder with mixed anxiety and depressed mood          Plan:    1.  Patient provided psychoeducation on anxiety, depression and ADHD  2.  Methods of management of episodes of increased stress; change of setting, deep breathing/relaxation, distraction.  3. Stress management discussed.  4.  Challenging anxious thinking and thinking errors to be discussed at upcoming visits.  5.  Patient to follow-up in 2 weeks.    Length of Session: 50 min spent face to face and 20 min spent in non face to face tasks

## 2024-03-10 ENCOUNTER — PATIENT MESSAGE (OUTPATIENT)
Dept: FAMILY MEDICINE | Facility: CLINIC | Age: 49
End: 2024-03-10
Payer: COMMERCIAL

## 2024-03-13 RX ORDER — SPIRONOLACTONE 50 MG/1
50 TABLET, FILM COATED ORAL DAILY
Qty: 90 TABLET | Refills: 0 | Status: SHIPPED | OUTPATIENT
Start: 2024-03-13 | End: 2024-06-10 | Stop reason: SDUPTHER

## 2024-03-19 ENCOUNTER — PATIENT MESSAGE (OUTPATIENT)
Dept: PSYCHIATRY | Facility: CLINIC | Age: 49
End: 2024-03-19
Payer: COMMERCIAL

## 2024-03-19 ENCOUNTER — OFFICE VISIT (OUTPATIENT)
Dept: PSYCHIATRY | Facility: CLINIC | Age: 49
End: 2024-03-19
Payer: COMMERCIAL

## 2024-03-19 DIAGNOSIS — F90.0 ATTENTION DEFICIT HYPERACTIVITY DISORDER (ADHD), PREDOMINANTLY INATTENTIVE TYPE: ICD-10-CM

## 2024-03-19 DIAGNOSIS — F43.23 ADJUSTMENT DISORDER WITH MIXED ANXIETY AND DEPRESSED MOOD: Primary | ICD-10-CM

## 2024-03-19 PROCEDURE — 90834 PSYTX W PT 45 MINUTES: CPT | Mod: 95,,, | Performed by: PSYCHOLOGIST

## 2024-03-19 NOTE — PROGRESS NOTES
Primary Care Behavioral Health: Follow-Up  Patient Name: Ana Dorantes  Date:  03/19/2024  Site:  [facility]  Referral source: Gina Barrios MD    The patient location is:  [address] [place] [address]  [place] [address]  The patient phone number is: [phone]  Visit type: Virtual visit with synchronous audio and video  Each patient to whom he or she provides medical services by telemedicine is:  (1) informed of the relationship between the physician and patient and the respective role of any other health care provider with respect to management of the patient; and (2) notified that he or she may decline to receive medical services by telemedicine and may withdraw from such care at any time.    Chief complaint/reason for encounter:  Anxiety, Caregiver Stress    History of present illness:  Ms. Ana Dorantes is a 48 y.o. White female referred by Gina Barrios MD.  Patient was seen, examined and chart was reviewed.  Patient notes she has been experiencing worsening symptoms of anxiety and depression. Patient notes she has been experiencing sleep difficulty. Patient notes she has been experiencing increased episodes of tearfulness.     Past Medical History:   Diagnosis Date    YAIR positive     Diabetes     Fatty liver     GERD (gastroesophageal reflux disease)     Hypertension     Sleep apnea     uses CPAP         Current Outpatient Medications:     blood sugar diagnostic Strp, 1 each by Misc.(Non-Drug; Combo Route) route once daily., Disp: 30 strip, Rfl: 6    blood-glucose meter (BLOOD GLUCOSE MONITORING) kit, Use as instructed, Disp: 1 each, Rfl: 0    EPINEPHrine (EPIPEN) 0.3 mg/0.3 mL AtIn, Inject 0.3 mLs (0.3 mg total) into the muscle once. for 1 dose (Patient not taking: Reported on 12/4/2023), Disp: 2 Device, Rfl: 0    ergocalciferol, vitamin D2, (VITAMIN D ORAL), Take by mouth., Disp: , Rfl:     finasteride (PROSCAR) 5 mg tablet, Take 5 mg by mouth once daily., Disp: , Rfl:      fluticasone (FLONASE) 50 mcg/actuation nasal spray, 2 sprays by Each Nare route once daily. (Patient taking differently: 2 sprays by Each Nostril route daily as needed.), Disp: 1 Bottle, Rfl: 11    metFORMIN (GLUCOPHAGE) 500 MG tablet, Take 1 tablet (500 mg total) by mouth once daily., Disp: 90 tablet, Rfl: 1    ondansetron (ZOFRAN) 8 MG tablet, Take 1 tablet (8 mg total) by mouth every 8 (eight) hours as needed for Nausea., Disp: 30 tablet, Rfl: 1    rosuvastatin (CRESTOR) 10 MG tablet, Take 1 tablet (10 mg total) by mouth once daily., Disp: 90 tablet, Rfl: 0    semaglutide (OZEMPIC) 2 mg/dose (8 mg/3 mL) PnIj, Inject 2 mg into the skin every 7 days., Disp: 9 mL, Rfl: 1    spironolactone (ALDACTONE) 50 MG tablet, Take 1 tablet (50 mg total) by mouth once daily., Disp: 90 tablet, Rfl: 0    Psychiatric symptoms:  Depression:  Patient endorses symptoms of  down mood, fatigue, sleep difficulty, difficulty with attention/concentration, feeling bad about herself. She denied suicidal ideation.  Ewa/Hypomania:  Denied.  She denied periods of elevated mood or abnormally increased energy or goal-directed activity.  Anxiety:  Patient endorses symptoms of nervousness, restlessness, worry  Thoughts:  Denied delusions, hallucinations.  Suicidal thoughts/behaviors: Patient denied suicidal and homicidal ideation, plan and intent.  Patient noted agreement to call 911/and or present to the ED if she experienced suicidal or homicidal ideation, plan or intent.  Patient denies owning a firearm.  Self-injury:  Patient denies.  Sleep: Patient notes she gets into bed around 11:30pm and falls asleep quickly. Patient notes she puts on her iPad and watches a show to fall asleep. Patient notes she wakes up around 3am and then has restless sleep. Patient notes she puts her iPad on again and watches a show. Patient notes around 9am she starts to fall back asleep. Patient notes she typically gets out of bed between 8-10am. Patient notes she  feels tired when she wakes in the morning. Patient notes she has been avoiding doing homework. Patient notes 'she is all over the place' during the daytime hours. Patient notes she has not been napping. Patient notes she drinks 1 cup of coffee per day.   Trauma: Patient notes her Father passed away when she was 3 years old.     Mental Status Exam:  General appearance:  appears stated age, neatly dressed, well groomed  Speech:  Clear and intelligible, normal rate, normal tone, normal pitch, normal volume  Level of cooperation:  cooperative  Thought processes:  Linear, logical, goal-directed  Mood:  Generally euthymic  Thought content:  Relevant and appropriate  Affect:  appropriate and congruent to mood  Orientation:  oriented to person, place, situation and date  Memory/Attention/Concentration:  No gross cognitive deficits made evident during conversation.  Judgment and insight: fair  Language:  intact        3/18/2024     4:19 PM 3/1/2024     9:31 AM 2/26/2024     9:14 PM   Results of the PHQ8   Little interest or pleasure in doing things More than half the days Not at all Not at all   Feeling down, depressed, or hopeless Several days Several days Several days   Trouble falling or staying asleep, or sleeping too much More than half the days Several days Several days   Feeling tired or having little energy Nearly every day More than half the days More than half the days   Poor appetite or overeating Several days Not at all Not at all   Feeling bad about yourself - or that you are a failure or have let yourself or your family down More than half the days More than half the days More than half the days   Trouble concentrating on things, such as reading the newspaper or watching television Nearly every day Nearly every day Nearly every day   Moving or speaking so slowly that other people could have noticed. Or the opposite - being so fidgety or restless that you have been moving around a lot more than usual Nearly  every day More than half the days More than half the days   Total Score  17 11 11             3/18/2024     4:17 PM 3/1/2024     9:31 AM 2/26/2024     9:11 PM   GAD7   1. Feeling nervous, anxious, or on edge? 2 1 1   2. Not being able to stop or control worrying? 2 1 1   3. Worrying too much about different things? 2 1 1   4. Trouble relaxing? 2 1 1   5. Being so restless that it is hard to sit still? 1 1 1   6. Becoming easily annoyed or irritable? 1 1 1   7. Feeling afraid as if something awful might happen? 0 1 1   KARTHIK-7 Score 10 7 7    7     Impression: Patient presents today noting some hx of experiencing mild symptoms of anxiety, particularly during periods of increased stress. Patient describes some recent symptoms of anxiety and depression which at this time appear primarily related to stress, particularly caring for her aging mother. Patient also expresses a hx of ADHD and notes she has not been treated in many years.     Diagnosis:  1. Adjustment disorder with mixed anxiety and depressed mood        2. Attention deficit hyperactivity disorder (ADHD), predominantly inattentive type        R/O MDD and KARTHIK    Plan:    1.  Patient provided psychoeducation on sleep hygiene, anxiety, depression and ADHD  2.  Methods of management of episodes of increased stress; change of setting, deep breathing/relaxation, distraction.  3. Stress management discussed.  4.  Identifying and challenging anxious thinking discussed.  5.  Setting a daily routine with small attainable daily goals discussed.   6.  Patient to follow-up in 2 weeks.    Length of Session: 40 min spent face to face and 20 min spent in non face to face tasks

## 2024-03-22 ENCOUNTER — OFFICE VISIT (OUTPATIENT)
Dept: PSYCHIATRY | Facility: CLINIC | Age: 49
End: 2024-03-22
Payer: COMMERCIAL

## 2024-03-22 DIAGNOSIS — F43.23 ADJUSTMENT DISORDER WITH MIXED ANXIETY AND DEPRESSED MOOD: ICD-10-CM

## 2024-03-22 DIAGNOSIS — F33.1 MODERATE EPISODE OF RECURRENT MAJOR DEPRESSIVE DISORDER: Primary | ICD-10-CM

## 2024-03-22 DIAGNOSIS — F90.0 ATTENTION DEFICIT HYPERACTIVITY DISORDER (ADHD), PREDOMINANTLY INATTENTIVE TYPE: ICD-10-CM

## 2024-03-22 PROCEDURE — 1159F MED LIST DOCD IN RCRD: CPT | Mod: CPTII,95,, | Performed by: PSYCHOLOGIST

## 2024-03-22 PROCEDURE — 90792 PSYCH DIAG EVAL W/MED SRVCS: CPT | Mod: 95,,, | Performed by: PSYCHOLOGIST

## 2024-03-22 RX ORDER — ESCITALOPRAM OXALATE 10 MG/1
10 TABLET ORAL DAILY
Qty: 30 TABLET | Refills: 1 | Status: SHIPPED | OUTPATIENT
Start: 2024-03-22 | End: 2024-04-15

## 2024-03-22 NOTE — LETTER
March 22, 2024        Gina Barrios MD  [address] [address]  [facility] [address]             [facility]  [address]  [facility] [address]  Phone: [phone]   Patient: Ana Dorantes   MR Number: [id number]   YOB: 1975   Date of Visit: 3/22/2024       Dear Dr. Barrios:    Thank you for referring Ana Dorantes to me for evaluation. Below are the relevant portions of my assessment and plan of care.    Pt is to start Lexapro 10mg once daily and continue in psychotherapy. Pt encouraged to start a cardio exercise regimen.    If you have questions, please do not hesitate to call me. I look forward to following Ana along with you.    Sincerely,      Saul Cronin, PhD, MP           CC    No Recipients
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
## 2024-04-02 ENCOUNTER — OFFICE VISIT (OUTPATIENT)
Dept: PSYCHIATRY | Facility: CLINIC | Age: 49
End: 2024-04-02
Payer: COMMERCIAL

## 2024-04-02 DIAGNOSIS — F33.1 MODERATE EPISODE OF RECURRENT MAJOR DEPRESSIVE DISORDER: Primary | ICD-10-CM

## 2024-04-02 DIAGNOSIS — F90.0 ATTENTION DEFICIT HYPERACTIVITY DISORDER (ADHD), PREDOMINANTLY INATTENTIVE TYPE: ICD-10-CM

## 2024-04-02 PROCEDURE — 90834 PSYTX W PT 45 MINUTES: CPT | Mod: 95,,, | Performed by: PSYCHOLOGIST

## 2024-04-02 NOTE — PROGRESS NOTES
Primary Care Behavioral Health: Follow-Up  Patient Name: Ana Dorantes  Date:  04/02/2024  Site:  [facility]  Referral source: Gina Barrios MD    The patient location is:  [address] [place] [address]  [place] [address]  The patient phone number is: [phone]  Visit type: Virtual visit with synchronous audio and video  Each patient to whom he or she provides medical services by telemedicine is:  (1) informed of the relationship between the physician and patient and the respective role of any other health care provider with respect to management of the patient; and (2) notified that he or she may decline to receive medical services by telemedicine and may withdraw from such care at any time.    Chief complaint/reason for encounter:  Anxiety, Caregiver Stress    History of present illness:  Ms. Ana Dorantes is a 48 y.o. White female referred by Gina Barrios MD.  Patient was seen, examined and chart was reviewed.  Patient notes she recently had a meeting with Dr. Saul Cronin. Patient notes she was recently started on prescribed Lexapro. Patient notes she has been experiencing increased fatigue, decreased desire to engage in tasks, desire for social isolation at times. Patient notes she has experienced a few times where she started to experience anxiety however was able to recognize symptoms and implement strategies for management of such. Patient discusses some ongoing difficulty with attention/concentration and slowed processing speed.     Past Medical History:   Diagnosis Date    YAIR positive     Diabetes     Fatty liver     GERD (gastroesophageal reflux disease)     Hypertension     Sleep apnea     uses CPAP       Current Outpatient Medications:     blood sugar diagnostic Strp, 1 each by Misc.(Non-Drug; Combo Route) route once daily., Disp: 30 strip, Rfl: 6    blood-glucose meter (BLOOD GLUCOSE MONITORING) kit, Use as instructed, Disp: 1 each, Rfl: 0    EPINEPHrine (EPIPEN) 0.3  mg/0.3 mL AtIn, Inject 0.3 mLs (0.3 mg total) into the muscle once. for 1 dose (Patient not taking: Reported on 12/4/2023), Disp: 2 Device, Rfl: 0    ergocalciferol, vitamin D2, (VITAMIN D ORAL), Take by mouth., Disp: , Rfl:     EScitalopram oxalate (LEXAPRO) 10 MG tablet, Take 1 tablet (10 mg total) by mouth once daily., Disp: 30 tablet, Rfl: 1    finasteride (PROSCAR) 5 mg tablet, Take 5 mg by mouth once daily., Disp: , Rfl:     fluticasone (FLONASE) 50 mcg/actuation nasal spray, 2 sprays by Each Nare route once daily. (Patient taking differently: 2 sprays by Each Nostril route daily as needed.), Disp: 1 Bottle, Rfl: 11    metFORMIN (GLUCOPHAGE) 500 MG tablet, Take 1 tablet (500 mg total) by mouth once daily., Disp: 90 tablet, Rfl: 1    ondansetron (ZOFRAN) 8 MG tablet, Take 1 tablet (8 mg total) by mouth every 8 (eight) hours as needed for Nausea., Disp: 30 tablet, Rfl: 1    rosuvastatin (CRESTOR) 10 MG tablet, Take 1 tablet (10 mg total) by mouth once daily., Disp: 90 tablet, Rfl: 0    semaglutide (OZEMPIC) 2 mg/dose (8 mg/3 mL) PnIj, Inject 2 mg into the skin every 7 days., Disp: 9 mL, Rfl: 1    spironolactone (ALDACTONE) 50 MG tablet, Take 1 tablet (50 mg total) by mouth once daily., Disp: 90 tablet, Rfl: 0    Psychiatric symptoms:  Depression:  Patient endorses symptoms of  down mood, fatigue, sleep difficulty, difficulty with attention/concentration, feeling bad about herself. She denied suicidal ideation.  Ewa/Hypomania:  Denied.  She denied periods of elevated mood or abnormally increased energy or goal-directed activity.  Anxiety:  Patient endorses symptoms of nervousness, restlessness, worry  Thoughts:  Denied delusions, hallucinations.  Suicidal thoughts/behaviors: Patient denied suicidal and homicidal ideation, plan and intent.  Patient advised to call 988, 911/and or present to the ED if she experienced suicidal or homicidal ideation, plan or intent.  Patient denies owning a firearm.  Self-injury:   Patient denies.  Sleep: Patient notes some increased fatigue and increased desire to engage in tasks.  Trauma: Patient notes her Father passed away when she was 3 years old.     Mental Status Exam:  General appearance:  appears stated age, neatly dressed, well groomed  Speech:  Clear and intelligible, normal rate, normal tone, normal pitch, normal volume  Level of cooperation:  cooperative  Thought processes:  Linear, logical, goal-directed  Mood:  Generally euthymic  Thought content:  Relevant and appropriate  Affect:  appropriate and congruent to mood  Orientation:  oriented to person, place, situation and date  Memory/Attention/Concentration:  No gross cognitive deficits made evident during conversation.  Judgment and insight: fair  Language:  intact        3/18/2024     4:19 PM 3/1/2024     9:31 AM 2/26/2024     9:14 PM   Results of the PHQ8   Little interest or pleasure in doing things More than half the days Not at all Not at all   Feeling down, depressed, or hopeless Several days Several days Several days   Trouble falling or staying asleep, or sleeping too much More than half the days Several days Several days   Feeling tired or having little energy Nearly every day More than half the days More than half the days   Poor appetite or overeating Several days Not at all Not at all   Feeling bad about yourself - or that you are a failure or have let yourself or your family down More than half the days More than half the days More than half the days   Trouble concentrating on things, such as reading the newspaper or watching television Nearly every day Nearly every day Nearly every day   Moving or speaking so slowly that other people could have noticed. Or the opposite - being so fidgety or restless that you have been moving around a lot more than usual Nearly every day More than half the days More than half the days   Total Score  17 11 11             3/18/2024     4:17 PM 3/1/2024     9:31 AM 2/26/2024     9:11  PM   GAD7   1. Feeling nervous, anxious, or on edge? 2 1 1   2. Not being able to stop or control worrying? 2 1 1   3. Worrying too much about different things? 2 1 1   4. Trouble relaxing? 2 1 1   5. Being so restless that it is hard to sit still? 1 1 1   6. Becoming easily annoyed or irritable? 1 1 1   7. Feeling afraid as if something awful might happen? 0 1 1   KARTHIK-7 Score 10 7 7    7     Impression: Patient presents today discussing symptoms of anxiety and depression. Patient notes she has become more aware of symptoms of anxiety and depression and has been implementing strategies discussed.    Diagnosis:  1. Moderate episode of recurrent major depressive disorder        2. Attention deficit hyperactivity disorder (ADHD), predominantly inattentive type          Plan:    1.  Patient provided psychoeducation on sleep hygiene, anxiety, depression and ADHD  2.  Methods of management of episodes of increased stress; change of setting, deep breathing/relaxation, distraction.  3. Stress management discussed.  4.  Identifying and challenging anxious thinking discussed.  5.  Setting a daily routine with small attainable daily goals discussed. Patient notes she has found this very helpful.   6.  Patient to follow-up in 2 weeks.    Length of Session: 38 min spent face to face and 20 min spent in non face to face tasks

## 2024-04-09 ENCOUNTER — PATIENT MESSAGE (OUTPATIENT)
Dept: PSYCHIATRY | Facility: CLINIC | Age: 49
End: 2024-04-09
Payer: COMMERCIAL

## 2024-04-15 RX ORDER — ESCITALOPRAM OXALATE 10 MG/1
10 TABLET ORAL
Qty: 90 TABLET | Refills: 1 | Status: SHIPPED | OUTPATIENT
Start: 2024-04-15

## 2024-04-16 ENCOUNTER — OFFICE VISIT (OUTPATIENT)
Dept: PSYCHIATRY | Facility: CLINIC | Age: 49
End: 2024-04-16
Payer: COMMERCIAL

## 2024-04-16 DIAGNOSIS — F33.1 MODERATE EPISODE OF RECURRENT MAJOR DEPRESSIVE DISORDER: Primary | ICD-10-CM

## 2024-04-16 DIAGNOSIS — F90.0 ATTENTION DEFICIT HYPERACTIVITY DISORDER (ADHD), PREDOMINANTLY INATTENTIVE TYPE: ICD-10-CM

## 2024-04-16 PROCEDURE — 90834 PSYTX W PT 45 MINUTES: CPT | Mod: 95,,, | Performed by: PSYCHOLOGIST

## 2024-04-16 NOTE — PROGRESS NOTES
Primary Care Behavioral Health: Follow-Up  Patient Name: Ana Dorantes  Date:  04/16/2024  Site:  [facility]  Referral source: Gina Barrios MD    The patient location is:  [address] [place] [address]  [place] [address]  The patient phone number is: [phone]  Visit type: Virtual visit with synchronous audio and video  Each patient to whom he or she provides medical services by telemedicine is:  (1) informed of the relationship between the physician and patient and the respective role of any other health care provider with respect to management of the patient; and (2) notified that he or she may decline to receive medical services by telemedicine and may withdraw from such care at any time.    Chief complaint/reason for encounter:  Anxiety, Caregiver Stress    History of present illness:  Ms. Ana Dorantes is a 48 y.o. White female referred by Gina Barrios MD.  Patient was seen, examined and chart was reviewed.  Patient notes she has experienced some ongoing difficulty with motivation. Patient notes she has been pushing herself to engage in some activities. Patient notes she has noticed she is feeling very overwhelmed in crowded or noisy situations.     Past Medical History:   Diagnosis Date    YAIR positive     Diabetes     Fatty liver     GERD (gastroesophageal reflux disease)     Hypertension     Sleep apnea     uses CPAP       Current Outpatient Medications:     blood sugar diagnostic Strp, 1 each by Misc.(Non-Drug; Combo Route) route once daily., Disp: 30 strip, Rfl: 6    blood-glucose meter (BLOOD GLUCOSE MONITORING) kit, Use as instructed, Disp: 1 each, Rfl: 0    EPINEPHrine (EPIPEN) 0.3 mg/0.3 mL AtIn, Inject 0.3 mLs (0.3 mg total) into the muscle once. for 1 dose (Patient not taking: Reported on 12/4/2023), Disp: 2 Device, Rfl: 0    ergocalciferol, vitamin D2, (VITAMIN D ORAL), Take by mouth., Disp: , Rfl:     EScitalopram oxalate (LEXAPRO) 10 MG tablet, TAKE 1 TABLET BY MOUTH  EVERY DAY, Disp: 90 tablet, Rfl: 1    finasteride (PROSCAR) 5 mg tablet, Take 5 mg by mouth once daily., Disp: , Rfl:     fluticasone (FLONASE) 50 mcg/actuation nasal spray, 2 sprays by Each Nare route once daily. (Patient taking differently: 2 sprays by Each Nostril route daily as needed.), Disp: 1 Bottle, Rfl: 11    metFORMIN (GLUCOPHAGE) 500 MG tablet, Take 1 tablet (500 mg total) by mouth once daily., Disp: 90 tablet, Rfl: 1    ondansetron (ZOFRAN) 8 MG tablet, Take 1 tablet (8 mg total) by mouth every 8 (eight) hours as needed for Nausea., Disp: 30 tablet, Rfl: 1    rosuvastatin (CRESTOR) 10 MG tablet, Take 1 tablet (10 mg total) by mouth once daily., Disp: 90 tablet, Rfl: 0    semaglutide (OZEMPIC) 2 mg/dose (8 mg/3 mL) PnIj, Inject 2 mg into the skin every 7 days., Disp: 9 mL, Rfl: 1    spironolactone (ALDACTONE) 50 MG tablet, Take 1 tablet (50 mg total) by mouth once daily., Disp: 90 tablet, Rfl: 0    Psychiatric symptoms:  Depression:  Patient endorses symptoms of  down mood, fatigue, low motivation, decreased desire to engage in tasks, sleep difficulty, difficulty with attention/concentration, feeling bad about herself. She denied suicidal ideation.  Ewa/Hypomania:  Denied.  She denied periods of elevated mood or abnormally increased energy or goal-directed activity.  Anxiety:  Patient endorses symptoms of nervousness, restlessness, worry  Thoughts:  Denied delusions, hallucinations.  Suicidal thoughts/behaviors: Patient denied suicidal and homicidal ideation, plan and intent.  Patient advised to call 988, 911/and or present to the ED if she experienced suicidal or homicidal ideation, plan or intent. Patient names her family as factors preventing and protecting her from harming herself. Patient denies owning a firearm.  Self-injury:  Patient denies.  Sleep: Patient notes some improvement in sleep.  Trauma: Patient notes her Father passed away when she was 3 years old.     Mental Status Exam:  General  appearance:  appears stated age, neatly dressed, well groomed  Speech:  Clear and intelligible, normal rate, normal tone, normal pitch, normal volume  Level of cooperation:  cooperative  Thought processes:  Linear, logical, goal-directed  Mood:  Generally euthymic  Thought content:  Relevant and appropriate  Affect:  appropriate and congruent to mood  Orientation:  oriented to person, place, situation and date  Memory/Attention/Concentration:  No gross cognitive deficits made evident during conversation.  Judgment and insight: fair  Language:  intact        4/10/2024     6:45 PM 4/2/2024    10:50 AM 3/18/2024     4:19 PM 3/1/2024     9:31 AM 2/26/2024     9:14 PM   Results of the PHQ8   Little interest or pleasure in doing things Several days More than half the days More than half the days Not at all Not at all   Feeling down, depressed, or hopeless Several days Several days Several days Several days Several days   Trouble falling or staying asleep, or sleeping too much Several days Several days More than half the days Several days Several days   Feeling tired or having little energy Several days Nearly every day Nearly every day More than half the days More than half the days   Poor appetite or overeating Several days Several days Several days Not at all Not at all   Feeling bad about yourself - or that you are a failure or have let yourself or your family down More than half the days More than half the days More than half the days More than half the days More than half the days   Trouble concentrating on things, such as reading the newspaper or watching television More than half the days Nearly every day Nearly every day Nearly every day Nearly every day   Moving or speaking so slowly that other people could have noticed. Or the opposite - being so fidgety or restless that you have been moving around a lot more than usual More than half the days More than half the days Nearly every day More than half the days More  than half the days   Total Score  11 15 17 11 11             4/10/2024     6:44 PM 4/2/2024    10:47 AM 3/18/2024     4:17 PM   GAD7   1. Feeling nervous, anxious, or on edge? 1 1 2   2. Not being able to stop or control worrying? 0 0 2   3. Worrying too much about different things? 1 0 2   4. Trouble relaxing? 1 1 2   5. Being so restless that it is hard to sit still? 0 0 1   6. Becoming easily annoyed or irritable? 1 1 1   7. Feeling afraid as if something awful might happen? 0 0 0   KARTHIK-7 Score 4 3 10     Impression: Patient presents today discussing symptoms of anxiety and depression. Patient notes she has become more aware of symptoms of anxiety and depression and has been implementing strategies discussed.    Diagnosis:  1. Moderate episode of recurrent major depressive disorder        2. Attention deficit hyperactivity disorder (ADHD), predominantly inattentive type          Plan:    1.  Patient provided psychoeducation on sleep hygiene, anxiety, depression and ADHD  2.  Methods of management of episodes of increased stress; change of setting, deep breathing/relaxation, distraction.  3. Stress management discussed.  4.  Identifying and challenging anxious thinking discussed.  5.  Setting a daily routine with small attainable daily goals discussed. Patient notes she has found this very helpful and will continue to engage in this.   6.  Patient to follow-up in 2 weeks.    Length of Session: 38 min spent face to face and 20 min spent in non face to face tasks

## 2024-04-23 ENCOUNTER — OFFICE VISIT (OUTPATIENT)
Dept: PSYCHIATRY | Facility: CLINIC | Age: 49
End: 2024-04-23
Payer: COMMERCIAL

## 2024-04-23 DIAGNOSIS — F90.0 ATTENTION DEFICIT HYPERACTIVITY DISORDER (ADHD), PREDOMINANTLY INATTENTIVE TYPE: Primary | ICD-10-CM

## 2024-04-23 DIAGNOSIS — F33.1 MODERATE EPISODE OF RECURRENT MAJOR DEPRESSIVE DISORDER: ICD-10-CM

## 2024-04-23 PROCEDURE — 99213 OFFICE O/P EST LOW 20 MIN: CPT | Mod: S$GLB,,, | Performed by: PSYCHOLOGIST

## 2024-04-23 PROCEDURE — 1159F MED LIST DOCD IN RCRD: CPT | Mod: CPTII,S$GLB,, | Performed by: PSYCHOLOGIST

## 2024-04-23 PROCEDURE — 99999 PR PBB SHADOW E&M-EST. PATIENT-LVL II: CPT | Mod: PBBFAC,,, | Performed by: PSYCHOLOGIST

## 2024-04-23 NOTE — PROGRESS NOTES
Outpatient Psychiatry Follow-Up Visit    Clinical Status of Patient: Outpatient (Ambulatory)  04/23/2024     Chief Complaint: 48 year old female presenting today for a follow-up.       Interval History and Content of Current Session:  Interim Events/Subjective Report/Content of Current Session:  follow-up appointment.    Pt is a 48 year old female with past psychiatric hx of depression, anxiety, and ADHD who presents for follow-up treatment. Pt had to push up her appt and so has only been taking Lexapro for 4 weeks. Pt finds minor improvement in her brain fog and energy levels. Pt is showering more regularly and feeling a bit better. Pt continues in therapy. Pt will message me in 3 weeks to give a report of how she is doing at this dosage. Pt denied any other major changes or new stressors.    Past Psychiatric hx: Focalin    Past Medical hx:   Past Medical History:   Diagnosis Date    YAIR positive     Diabetes     Fatty liver     GERD (gastroesophageal reflux disease)     Hypertension     Sleep apnea     uses CPAP        Interim hx:  Medication changes last visit:   started Lexapro 10mg once daily  Anxiety: mild - improved  Depression: mild - improved     Denies suicidal/homicidal ideations.  Denies hopelessness/worthlessness.    Denies auditory/visual hallucinations      Alcohol: minimal, infrequent  Drug: occasional THC cartridge use  Caffeine: minimal use  Tobacco: pt denied      Review of Systems   PSYCHIATRIC: Pertinent items are noted in the narrative.        CONSTITUTIONAL: weight stable    Past Medical, Family and Social History: The patient's past medical, family and social history have been reviewed and updated as appropriate within the electronic medical record. See encounter notes.     Current Psychiatric Medication:  Lexapro 10mg once daily     Compliance: yes      Side effects: low libido     Risk Parameters:  Patient reports no suicidal ideation  Patient reports no homicidal ideation  Patient reports  no self-injurious behavior  Patient reports no violent behavior     Exam (detailed: at least 9 elements; comprehensive: all 15 elements)   Constitutional  Vitals:  Most recent vital signs, dated less than 90 days prior to this appointment, were reviewed.        General:  unremarkable, age appropriate, casual attire, good eye contact, good rapport       Musculoskeletal  Muscle Strength/Tone:  no flaccidity, no tremor    Gait & Station:  normal      Psychiatric                       Speech:  normal tone, normal rate, rhythm, and volume   Mood & Affect:   Depressed, anxious         Thought Process:   Goal directed; Linear    Associations:   intact   Thought Content:   No SI/HI, delusions, or paranoia, no AV/VH   Insight & Judgement:   Good, adequate to circumstances   Orientation:   grossly intact; alert and oriented x 4    Memory:  intact for content of interview    Language:  grossly intact, can repeat    Attention Span  : Grossly intact for content of interview   Fund of Knowledge:   intact and appropriate to age and level of education        Assessment and Diagnosis   Status/Progress: improving     Impression: Pt appears to struggle with recurrent depression and secondary anxiety. Pt has also been evaluated and treated for ADHD in the past. Pt should continue in therapy, continue in psychiatric medication management, and increase her cardio exercise regimen.     Diagnosis(es):   1) Major Depressive Disorder, recurrent, moderate  2) ADHD, predominately inattentive type    Intervention/Counseling/Treatment Plan   Medication Management:      1. Continue Lexapro 10mg once daily     2. Continue in counseling     3. Call to report any worsening of symptoms or problems with the medication. Pt instructed to go to ER with thoughts of harming self, others    Psychotherapy:   Target symptoms:   Why chosen therapy is appropriate versus another modality: CBT used; relevant to diagnosis, patient responds to this  modality  Outcome monitoring methods: self-report, observation  Therapeutic intervention type: Cognitive Behavioral Therapy  Topics discussed/themes: building skills sets for symptom management, symptom recognition, nutrition, exercise  The patient's response to the intervention is good  Patient's response to treatment is: good.   The patient's progress toward treatment goals: improving     Return to clinic: 2 months    -Cognitive-Behavioral/Supportive therapy and psychoeducation provided  -R/B/SE's of medications discussed with the pt who expresses understanding and chooses to take medications as prescribed.   -Pt instructed to call clinic, 911 or go to nearest emergency room if sxs worsen or pt is in   crisis. The pt expresses understanding.    Saul Cronin, PhD, MP

## 2024-04-30 ENCOUNTER — OFFICE VISIT (OUTPATIENT)
Dept: PSYCHIATRY | Facility: CLINIC | Age: 49
End: 2024-04-30
Payer: COMMERCIAL

## 2024-04-30 DIAGNOSIS — F33.1 MODERATE EPISODE OF RECURRENT MAJOR DEPRESSIVE DISORDER: Primary | ICD-10-CM

## 2024-04-30 PROCEDURE — 90832 PSYTX W PT 30 MINUTES: CPT | Mod: 95,,, | Performed by: PSYCHOLOGIST

## 2024-04-30 NOTE — PROGRESS NOTES
Primary Care Behavioral Health: Follow-Up  Patient Name: Ana Dorantes  Date:  04/30/2024  Site:  [facility]  Referral source: Gina Barrios MD    The patient location is:  [address] [place] [address]  [place] [id number]  The patient phone number is: [phone]  Visit type: Virtual visit with synchronous audio and video  Each patient to whom he or she provides medical services by telemedicine is:  (1) informed of the relationship between the physician and patient and the respective role of any other health care provider with respect to management of the patient; and (2) notified that he or she may decline to receive medical services by telemedicine and may withdraw from such care at any time.    Chief complaint/reason for encounter:  Anxiety, Caregiver Stress    History of present illness:  Ms. Ana Dorantes is a 48 y.o. White female referred by Gina Barrios MD.  Patient was seen, examined and chart was reviewed.  Patient notes notable improvement since meeting with provider last. Patient notes improved motivation, increased desire to engage in tasks. Patient notes she has been able to set and achieve daily goals. Patient notes at times she feels like she is 'forcing' herself to initiate steps towards goals however once she starts engaging in tasks she at times she feels overall good while engaging in tasks.     Past Medical History:   Diagnosis Date    YAIR positive     Diabetes     Fatty liver     GERD (gastroesophageal reflux disease)     Hypertension     Sleep apnea     uses CPAP       Current Outpatient Medications:     blood sugar diagnostic Strp, 1 each by Misc.(Non-Drug; Combo Route) route once daily., Disp: 30 strip, Rfl: 6    blood-glucose meter (BLOOD GLUCOSE MONITORING) kit, Use as instructed, Disp: 1 each, Rfl: 0    EPINEPHrine (EPIPEN) 0.3 mg/0.3 mL AtIn, Inject 0.3 mLs (0.3 mg total) into the muscle once. for 1 dose (Patient not taking: Reported on 12/4/2023), Disp: 2  Device, Rfl: 0    ergocalciferol, vitamin D2, (VITAMIN D ORAL), Take by mouth., Disp: , Rfl:     EScitalopram oxalate (LEXAPRO) 10 MG tablet, TAKE 1 TABLET BY MOUTH EVERY DAY, Disp: 90 tablet, Rfl: 1    finasteride (PROSCAR) 5 mg tablet, Take 5 mg by mouth once daily., Disp: , Rfl:     fluticasone (FLONASE) 50 mcg/actuation nasal spray, 2 sprays by Each Nare route once daily. (Patient taking differently: 2 sprays by Each Nostril route daily as needed.), Disp: 1 Bottle, Rfl: 11    metFORMIN (GLUCOPHAGE) 500 MG tablet, Take 1 tablet (500 mg total) by mouth once daily., Disp: 90 tablet, Rfl: 1    ondansetron (ZOFRAN) 8 MG tablet, Take 1 tablet (8 mg total) by mouth every 8 (eight) hours as needed for Nausea., Disp: 30 tablet, Rfl: 1    rosuvastatin (CRESTOR) 10 MG tablet, Take 1 tablet (10 mg total) by mouth once daily., Disp: 90 tablet, Rfl: 0    semaglutide (OZEMPIC) 2 mg/dose (8 mg/3 mL) PnIj, Inject 2 mg into the skin every 7 days., Disp: 9 mL, Rfl: 1    spironolactone (ALDACTONE) 50 MG tablet, Take 1 tablet (50 mg total) by mouth once daily., Disp: 90 tablet, Rfl: 0    Psychiatric symptoms:  Depression:  Patient endorses symptoms of  down mood, fatigue, low motivation, decreased desire to engage in tasks, sleep difficulty, difficulty with attention/concentration, feeling bad about herself however notes these have demonstrated improvement in intensity and frequency. She denied suicidal ideation.  Ewa/Hypomania:  Denied.  She denied periods of elevated mood or abnormally increased energy or goal-directed activity.  Anxiety:  Patient endorses symptoms of nervousness, difficulty relaxing at times, irritability however notes overall symptoms have demonstrated improvement in intensity and frequency.  Thoughts:  Denied delusions, hallucinations.  Suicidal thoughts/behaviors: Patient denied suicidal and homicidal ideation, plan and intent.  Patient advised to call [phone], 911/and or present to the ED if she experienced  suicidal or homicidal ideation, plan or intent. Patient names her family as factors preventing and protecting her from harming herself. Patient denies owning a firearm.  Self-injury:  Patient denies.  Sleep: Patient notes some ongoing improvement in sleep.  Trauma: Patient notes her Father passed away when she was 3 years old.     Mental Status Exam:  General appearance:  appears stated age, neatly dressed, well groomed  Speech:  Clear and intelligible, normal rate, normal tone, normal pitch, normal volume  Level of cooperation:  cooperative  Thought processes:  Linear, logical, goal-directed  Mood:  Generally euthymic  Thought content:  Relevant and appropriate  Affect:  appropriate and congruent to mood  Orientation:  oriented to person, place, situation and date  Memory/Attention/Concentration:  No gross cognitive deficits made evident during conversation.  Judgment and insight: fair  Language:  intact        4/30/2024    11:35 AM 4/10/2024     6:45 PM 4/2/2024    10:50 AM 3/18/2024     4:19 PM 3/1/2024     9:31 AM 2/26/2024     9:14 PM   Results of the PHQ8   Little interest or pleasure in doing things Several days Several days More than half the days More than half the days Not at all Not at all   Feeling down, depressed, or hopeless Several days Several days Several days Several days Several days Several days   Trouble falling or staying asleep, or sleeping too much Several days Several days Several days More than half the days Several days Several days   Feeling tired or having little energy Several days Several days Nearly every day Nearly every day More than half the days More than half the days   Poor appetite or overeating Several days Several days Several days Several days Not at all Not at all   Feeling bad about yourself - or that you are a failure or have let yourself or your family down Not at all More than half the days More than half the days More than half the days More than half the days More  than half the days   Trouble concentrating on things, such as reading the newspaper or watching television More than half the days More than half the days Nearly every day Nearly every day Nearly every day Nearly every day   Moving or speaking so slowly that other people could have noticed. Or the opposite - being so fidgety or restless that you have been moving around a lot more than usual Several days More than half the days More than half the days Nearly every day More than half the days More than half the days   Total Score  8 11 15 17 11 11             4/30/2024    11:33 AM 4/10/2024     6:44 PM 4/2/2024    10:47 AM   GAD7   1. Feeling nervous, anxious, or on edge? 1 1 1   2. Not being able to stop or control worrying? 0 0 0   3. Worrying too much about different things? 0 1 0   4. Trouble relaxing? 1 1 1   5. Being so restless that it is hard to sit still? 0 0 0   6. Becoming easily annoyed or irritable? 1 1 1   7. Feeling afraid as if something awful might happen? 0 0 0   KARTHIK-7 Score 3 4 3     Impression: Patient presents today discussing symptoms of anxiety and depression. Patient notes she has become more aware of symptoms of anxiety and depression and has been implementing strategies discussed. Patient notes overall improvement in intensity and frequency of symptoms of anxiety and depression.    Diagnosis:  1. Moderate episode of recurrent major depressive disorder          Plan:    1.  Patient provided psychoeducation on sleep hygiene, anxiety, depression and ADHD  2.  Methods of management of episodes of increased stress; change of setting, deep breathing/relaxation, distraction.  3. Stress management discussed.  4.  Identifying and challenging anxious thinking discussed.  5.  Setting a daily routine with small attainable daily goals discussed. Patient notes she has found this very helpful and will continue to engage in this.   6.  Patient to follow-up in 2 weeks.    Length of Session: 28 min spent face  to face and 20 min spent in non face to face tasks

## 2024-05-16 ENCOUNTER — PATIENT MESSAGE (OUTPATIENT)
Dept: PSYCHIATRY | Facility: CLINIC | Age: 49
End: 2024-05-16
Payer: COMMERCIAL

## 2024-05-16 ENCOUNTER — OFFICE VISIT (OUTPATIENT)
Dept: PSYCHIATRY | Facility: CLINIC | Age: 49
End: 2024-05-16
Payer: COMMERCIAL

## 2024-05-16 DIAGNOSIS — F33.1 MODERATE EPISODE OF RECURRENT MAJOR DEPRESSIVE DISORDER: Primary | ICD-10-CM

## 2024-05-16 PROCEDURE — 90832 PSYTX W PT 30 MINUTES: CPT | Mod: 95,,, | Performed by: PSYCHOLOGIST

## 2024-05-16 NOTE — PROGRESS NOTES
Primary Care Behavioral Health: Follow-Up  Patient Name: Ana Dorantes  Date:  05/16/2024  Site:  [facility]  Referral source: Gina Barrios MD    The patient location is:  [address] [place] [address]  [place] [address]  The patient phone number is: [phone]  Visit type: Virtual visit with synchronous audio and video  Each patient to whom he or she provides medical services by telemedicine is:  (1) informed of the relationship between the physician and patient and the respective role of any other health care provider with respect to management of the patient; and (2) notified that he or she may decline to receive medical services by telemedicine and may withdraw from such care at any time.    Chief complaint/reason for encounter:  Anxiety, Caregiver Stress    History of present illness:  Ms. Ana Dorantes is a 48 y.o. White female referred by Gina Barrios MD.  Patient was seen, examined and chart was reviewed.  Patient notes notable improvement in symptoms of anxiety and depression since meeting with provider last. Patient notes she has been utilizing a planner and has found this very helpful to assist with time management and organization. Patient notes she has been engaging in more socialization and has been enjoying this.     Past Medical History:   Diagnosis Date    YAIR positive     Diabetes     Fatty liver     GERD (gastroesophageal reflux disease)     Hypertension     Sleep apnea     uses CPAP       Current Outpatient Medications:     blood sugar diagnostic Strp, 1 each by Misc.(Non-Drug; Combo Route) route once daily., Disp: 30 strip, Rfl: 6    blood-glucose meter (BLOOD GLUCOSE MONITORING) kit, Use as instructed, Disp: 1 each, Rfl: 0    EPINEPHrine (EPIPEN) 0.3 mg/0.3 mL AtIn, Inject 0.3 mLs (0.3 mg total) into the muscle once. for 1 dose (Patient not taking: Reported on 12/4/2023), Disp: 2 Device, Rfl: 0    ergocalciferol, vitamin D2, (VITAMIN D ORAL), Take by mouth., Disp: ,  Rfl:     EScitalopram oxalate (LEXAPRO) 10 MG tablet, TAKE 1 TABLET BY MOUTH EVERY DAY, Disp: 90 tablet, Rfl: 1    finasteride (PROSCAR) 5 mg tablet, Take 5 mg by mouth once daily., Disp: , Rfl:     fluticasone (FLONASE) 50 mcg/actuation nasal spray, 2 sprays by Each Nare route once daily. (Patient taking differently: 2 sprays by Each Nostril route daily as needed.), Disp: 1 Bottle, Rfl: 11    metFORMIN (GLUCOPHAGE) 500 MG tablet, Take 1 tablet (500 mg total) by mouth once daily., Disp: 90 tablet, Rfl: 1    ondansetron (ZOFRAN) 8 MG tablet, Take 1 tablet (8 mg total) by mouth every 8 (eight) hours as needed for Nausea., Disp: 30 tablet, Rfl: 1    rosuvastatin (CRESTOR) 10 MG tablet, Take 1 tablet (10 mg total) by mouth once daily., Disp: 90 tablet, Rfl: 0    semaglutide (OZEMPIC) 2 mg/dose (8 mg/3 mL) PnIj, Inject 2 mg into the skin every 7 days., Disp: 9 mL, Rfl: 1    spironolactone (ALDACTONE) 50 MG tablet, Take 1 tablet (50 mg total) by mouth once daily., Disp: 90 tablet, Rfl: 0    Psychiatric symptoms:  Depression:  Patient endorses symptoms of  down mood, fatigue, low motivation, decreased desire to engage in tasks, sleep difficulty, difficulty with attention/concentration, feeling bad about herself however notes overall these have demonstrated improvement in intensity and frequency. She denied suicidal ideation.  Ewa/Hypomania:  Denied.  She denied periods of elevated mood or abnormally increased energy or goal-directed activity.  Anxiety:  Patient notes some difficulty relaxing at times however notes overall symptoms have demonstrated improvement in intensity and frequency.  Thoughts:  Denied delusions, hallucinations.  Suicidal thoughts/behaviors: Patient denied suicidal and homicidal ideation, plan and intent.  Patient advised to call 988, 911/and or present to the ED if she experienced suicidal or homicidal ideation, plan or intent. Patient names her family as factors preventing and protecting her from  harming herself. Patient denies owning a firearm.  Self-injury:  Patient denies.  Sleep: Patient notes she falls asleep around 2-4am and wakes up  around 11:30am.   Trauma: Patient notes her Father passed away when she was 3 years old.     Mental Status Exam:  General appearance:  appears stated age, neatly dressed, well groomed  Speech:  Clear and intelligible, normal rate, normal tone, normal pitch, normal volume  Level of cooperation:  cooperative  Thought processes:  Linear, logical, goal-directed  Mood:  Generally euthymic  Thought content:  Relevant and appropriate  Affect:  appropriate and congruent to mood  Orientation:  oriented to person, place, situation and date  Memory/Attention/Concentration:  No gross cognitive deficits made evident during conversation.  Judgment and insight: fair  Language:  intact        5/16/2024     1:59 PM 4/30/2024    11:35 AM 4/10/2024     6:45 PM 4/2/2024    10:50 AM 3/18/2024     4:19 PM 3/1/2024     9:31 AM 2/26/2024     9:14 PM   Results of the PHQ8   Little interest or pleasure in doing things Several days Several days Several days More than half the days More than half the days Not at all Not at all   Feeling down, depressed, or hopeless Several days Several days Several days Several days Several days Several days Several days   Trouble falling or staying asleep, or sleeping too much Several days Several days Several days Several days More than half the days Several days Several days   Feeling tired or having little energy Several days Several days Several days Nearly every day Nearly every day More than half the days More than half the days   Poor appetite or overeating Several days Several days Several days Several days Several days Not at all Not at all   Feeling bad about yourself - or that you are a failure or have let yourself or your family down Several days Not at all More than half the days More than half the days More than half the days More than half the days  More than half the days   Trouble concentrating on things, such as reading the newspaper or watching television More than half the days More than half the days More than half the days Nearly every day Nearly every day Nearly every day Nearly every day   Moving or speaking so slowly that other people could have noticed. Or the opposite - being so fidgety or restless that you have been moving around a lot more than usual Several days Several days More than half the days More than half the days Nearly every day More than half the days More than half the days   Total Score  9 8 11 15 17 11 11             5/16/2024     1:58 PM 4/30/2024    11:33 AM 4/10/2024     6:44 PM   GAD7   1. Feeling nervous, anxious, or on edge? 0 1 1   2. Not being able to stop or control worrying? 0 0 0   3. Worrying too much about different things? 0 0 1   4. Trouble relaxing? 1 1 1   5. Being so restless that it is hard to sit still? 0 0 0   6. Becoming easily annoyed or irritable? 0 1 1   7. Feeling afraid as if something awful might happen? 0 0 0   KARTHIK-7 Score 1 3 4     Impression: Patient presents today discussing symptoms of anxiety and depression. Patient notes she has become more aware of symptoms of anxiety and depression and has been able to continue to implement strategies discussed. Patient notes overall experiencing an ongoing improvement in intensity and frequency of symptoms of anxiety and depression.    Diagnosis:  1. Moderate episode of recurrent major depressive disorder          Plan:    1.  Patient provided psychoeducation on sleep hygiene, anxiety, depression and ADHD  2.  Methods of management of episodes of increased stress; change of setting, deep breathing/relaxation, distraction.  3. Stress management discussed.  4.  Identifying and challenging anxious thinking discussed.  5.  Organization and time management skills discussed.   6.  Patient to follow-up in 3 weeks.    Length of Session: 18 min spent face to face and 20  min spent in non face to face tasks

## 2024-06-03 ENCOUNTER — TELEPHONE (OUTPATIENT)
Dept: FAMILY MEDICINE | Facility: CLINIC | Age: 49
End: 2024-06-03
Payer: COMMERCIAL

## 2024-06-05 DIAGNOSIS — E11.8 TYPE 2 DIABETES WITH COMPLICATION: ICD-10-CM

## 2024-06-05 RX ORDER — METFORMIN HYDROCHLORIDE 500 MG/1
500 TABLET ORAL
Qty: 90 TABLET | Refills: 0 | Status: SHIPPED | OUTPATIENT
Start: 2024-06-05 | End: 2024-06-10

## 2024-06-05 NOTE — TELEPHONE ENCOUNTER
Refill Routing Note   Medication(s) are not appropriate for processing by [facility] for the following reason(s):        Required labs outdated    [facility] action(s):  Defer        Medication Therapy Plan: FLOS 06/12/24      Appointments  past 12m or future 3m with PCP    Date Provider   Last Visit   12/4/2023 Gina Barrios MD   Next Visit   6/10/2024 Gina Barrios MD   ED visits in past 90 days: 0        Note composed:6:05 AM 06/05/2024

## 2024-06-05 NOTE — TELEPHONE ENCOUNTER
Care Due:                  Date            Visit Type   Department     Provider  --------------------------------------------------------------------------------                                EP -                              PRIMARY      [facility] Gina Barrios  Last Visit: 12-      CARE ([facility])   MEDICINE       Anger                              EP -                              PRIMARY      [facility]    Gina Barrios  Next Visit: 06-      CARE ([facility])   MEDICINE       Anger                                                            Last  Test          Frequency    Reason                     Performed    Due Date  --------------------------------------------------------------------------------    HBA1C.......  6 months...  metFORMIN, semaglutide...  12- 06-    Lipid Panel.  12 months..  rosuvastatin.............  05- 05-    [facility] Embedded Care Due Messages. Reference number: [id number].   6/05/2024 12:39:14 AM CDT

## 2024-06-10 ENCOUNTER — OFFICE VISIT (OUTPATIENT)
Dept: FAMILY MEDICINE | Facility: CLINIC | Age: 49
End: 2024-06-10
Payer: COMMERCIAL

## 2024-06-10 VITALS
WEIGHT: 171.63 LBS | TEMPERATURE: 98 F | DIASTOLIC BLOOD PRESSURE: 82 MMHG | BODY MASS INDEX: 28.6 KG/M2 | OXYGEN SATURATION: 99 % | HEIGHT: 65 IN | RESPIRATION RATE: 18 BRPM | SYSTOLIC BLOOD PRESSURE: 118 MMHG | HEART RATE: 66 BPM

## 2024-06-10 DIAGNOSIS — E11.8 DIABETES MELLITUS TYPE 2 WITH COMPLICATIONS: ICD-10-CM

## 2024-06-10 DIAGNOSIS — T46.6X5A MYALGIA DUE TO STATIN: ICD-10-CM

## 2024-06-10 DIAGNOSIS — Z00.00 ANNUAL PHYSICAL EXAM: Primary | ICD-10-CM

## 2024-06-10 DIAGNOSIS — M79.10 MYALGIA DUE TO STATIN: ICD-10-CM

## 2024-06-10 PROCEDURE — 3079F DIAST BP 80-89 MM HG: CPT | Mod: CPTII,S$GLB,, | Performed by: FAMILY MEDICINE

## 2024-06-10 PROCEDURE — 90750 HZV VACC RECOMBINANT IM: CPT | Mod: S$GLB,,, | Performed by: FAMILY MEDICINE

## 2024-06-10 PROCEDURE — 3074F SYST BP LT 130 MM HG: CPT | Mod: CPTII,S$GLB,, | Performed by: FAMILY MEDICINE

## 2024-06-10 PROCEDURE — 1159F MED LIST DOCD IN RCRD: CPT | Mod: CPTII,S$GLB,, | Performed by: FAMILY MEDICINE

## 2024-06-10 PROCEDURE — 1160F RVW MEDS BY RX/DR IN RCRD: CPT | Mod: CPTII,S$GLB,, | Performed by: FAMILY MEDICINE

## 2024-06-10 PROCEDURE — 99396 PREV VISIT EST AGE 40-64: CPT | Mod: 25,S$GLB,, | Performed by: FAMILY MEDICINE

## 2024-06-10 PROCEDURE — 3008F BODY MASS INDEX DOCD: CPT | Mod: CPTII,S$GLB,, | Performed by: FAMILY MEDICINE

## 2024-06-10 PROCEDURE — 90471 IMMUNIZATION ADMIN: CPT | Mod: S$GLB,,, | Performed by: FAMILY MEDICINE

## 2024-06-10 RX ORDER — SEMAGLUTIDE 2.68 MG/ML
2 INJECTION, SOLUTION SUBCUTANEOUS
Qty: 9 ML | Refills: 1 | Status: SHIPPED | OUTPATIENT
Start: 2024-06-10

## 2024-06-10 RX ORDER — SPIRONOLACTONE 50 MG/1
50 TABLET, FILM COATED ORAL DAILY
Qty: 90 TABLET | Refills: 1 | Status: SHIPPED | OUTPATIENT
Start: 2024-06-10

## 2024-06-10 NOTE — PROGRESS NOTES
Subjective:       Patient ID: Ana Dorantes is a 49 y.o. female.    Chief Complaint: Follow-up (6 month )    HPI  The patient is a 49-year-old who is here today for follow-up.  Overall, she is doing fairly well.  She does have upcoming labs scheduled.  She would be willing to have her mammogram scheduled.  She would be willing to receive her Shingrix shot today.  She is not exercising regularly but will try to get back to that.     Today we discussed the followin)  Depression.  At the 1st of the year, she was in a really bad place emotionally with all the stressors she had been dealing with.  She has been seeing the mental health team which has been helpful.  She is taking Lexapro and seeing a counselor regularly.  On a scale of 1-10 with 10 being great emotionally, she has feeling about a 7 today.  She is doing a lot better with the mental health care she has been receiving.  She still has her days where she is up and down but overall she is doing much better.  2) hypertension.  She is currently taking Aldactone partially for her blood pressure and partially for hair loss.  Her blood pressure is 118/82  3) diabetes.  She is currently taking Ozempic.  She has not been checking her sugars at home.  She is due for an eye exam and will schedule this soon.  She notes that she was not able to tolerate the Crestor due to muscle pain and has stopped it with resolution of her symptoms    Review of Systems   Constitutional:  Negative for appetite change, chills, diaphoresis, fatigue, fever and unexpected weight change.   HENT:  Negative for congestion, dental problem, ear pain, hearing loss, postnasal drip, rhinorrhea, sinus pressure, sneezing, sore throat and trouble swallowing.    Eyes:  Negative for photophobia, pain, discharge and visual disturbance.   Respiratory:  Negative for cough, chest tightness, shortness of breath and wheezing.    Cardiovascular:  Negative for chest pain, palpitations and leg  swelling.   Gastrointestinal:  Negative for abdominal distention, abdominal pain, blood in stool, constipation, diarrhea, nausea and vomiting.   Endocrine: Negative for cold intolerance, heat intolerance, polydipsia and polyuria.   Genitourinary:  Negative for dysuria, flank pain, frequency, genital sores, hematuria, menstrual problem and vaginal discharge.   Musculoskeletal:  Negative for arthralgias, joint swelling and myalgias.   Skin:  Negative for rash.   Neurological:  Negative for dizziness, syncope, light-headedness and headaches.   Hematological:  Negative for adenopathy. Does not bruise/bleed easily.   Psychiatric/Behavioral:  Negative for dysphoric mood, self-injury, sleep disturbance and suicidal ideas. The patient is not nervous/anxious.          Objective:      Physical Exam  Constitutional:       General: She is not in acute distress.     Appearance: Normal appearance. She is well-developed.   HENT:      Head: Normocephalic and atraumatic.      Right Ear: Hearing, tympanic membrane, ear canal and external ear normal.      Left Ear: Hearing, tympanic membrane, ear canal and external ear normal.      Nose: Nose normal.      Mouth/Throat:      Mouth: No oral lesions.      Pharynx: No oropharyngeal exudate or posterior oropharyngeal erythema.   Eyes:      General: Lids are normal. No scleral icterus.     Extraocular Movements: Extraocular movements intact.      Conjunctiva/sclera: Conjunctivae normal.      Pupils: Pupils are equal, round, and reactive to light.   Neck:      Thyroid: No thyroid mass or thyromegaly.      Vascular: No carotid bruit.   Cardiovascular:      Rate and Rhythm: Normal rate and regular rhythm. No extrasystoles are present.     Chest Wall: PMI is not displaced.      Pulses:           Dorsalis pedis pulses are 2+ on the right side and 2+ on the left side.        Posterior tibial pulses are 2+ on the right side and 2+ on the left side.      Heart sounds: Normal heart sounds. No murmur  "heard.     No gallop.   Pulmonary:      Effort: Pulmonary effort is normal. No accessory muscle usage or respiratory distress.      Breath sounds: Normal breath sounds.   Abdominal:      General: Bowel sounds are normal. There is no abdominal bruit.      Palpations: Abdomen is soft.      Tenderness: There is no abdominal tenderness. There is no rebound.   Musculoskeletal:      Cervical back: Normal range of motion and neck supple.      Right foot: No deformity.      Left foot: No deformity.   Feet:      Right foot:      Protective Sensation: 10 sites tested.  10 sites sensed.      Skin integrity: Warmth present. No ulcer or callus.      Left foot:      Protective Sensation: 10 sites tested.  10 sites sensed.      Skin integrity: Warmth present. No ulcer or callus.   Lymphadenopathy:      Head:      Right side of head: No submental or submandibular adenopathy.      Left side of head: No submental or submandibular adenopathy.      Cervical:      Right cervical: No superficial, deep or posterior cervical adenopathy.     Left cervical: No superficial, deep or posterior cervical adenopathy.      Upper Body:      Right upper body: No supraclavicular adenopathy.      Left upper body: No supraclavicular adenopathy.   Skin:     General: Skin is warm and dry.   Neurological:      Mental Status: She is alert and oriented to person, place, and time.      Cranial Nerves: No cranial nerve deficit.      Sensory: No sensory deficit.   Psychiatric:         Speech: Speech normal.         Behavior: Behavior normal.         Thought Content: Thought content normal.       Blood pressure 118/82, pulse 66, temperature 98.4 °F (36.9 °C), resp. rate 18, height 5' 5" (1.651 m), weight 77.9 kg (171 lb 10.1 oz), last menstrual period 05/29/2024, SpO2 99%.Body mass index is 28.56 kg/m².            A/P:  1) annual exam.  Health maintenance issues and anticipatory guidance issues were discussed.  She will try to be more physically active.  She " will continue to consume a healthy diet.  She will have fasting labs soon.  We will schedule her mammogram   2)  Depression.  Well controlled.  Follow up with mental health team and continue Lexapro under their direction  2) hypertension.  Well controlled.  Continue with Aldactone  3) diabetes.  Status unknown.  We will check an A1c.  Continue with Ozempic.  She will schedule her eye exam  4) myalgias from statins.    As long as her labs look good, I will plan to see her back in 6 months or sooner if needed

## 2024-06-11 DIAGNOSIS — E11.8 DIABETES MELLITUS TYPE 2 WITH COMPLICATIONS: ICD-10-CM

## 2024-06-12 ENCOUNTER — LAB VISIT (OUTPATIENT)
Dept: LAB | Facility: HOSPITAL | Age: 49
End: 2024-06-12
Attending: FAMILY MEDICINE
Payer: COMMERCIAL

## 2024-06-12 DIAGNOSIS — E11.8 DIABETES MELLITUS TYPE 2 WITH COMPLICATIONS: ICD-10-CM

## 2024-06-12 LAB
ALBUMIN SERPL BCP-MCNC: 3.4 G/DL (ref 3.5–5.2)
ALP SERPL-CCNC: 62 U/L (ref 55–135)
ALT SERPL W/O P-5'-P-CCNC: 18 U/L (ref 10–44)
ANION GAP SERPL CALC-SCNC: 11 MMOL/L (ref 8–16)
AST SERPL-CCNC: 24 U/L (ref 10–40)
BASOPHILS # BLD AUTO: 0.02 K/UL (ref 0–0.2)
BASOPHILS NFR BLD: 0.3 % (ref 0–1.9)
BILIRUB SERPL-MCNC: 0.4 MG/DL (ref 0.1–1)
BUN SERPL-MCNC: 7 MG/DL (ref 6–20)
CALCIUM SERPL-MCNC: 9 MG/DL (ref 8.7–10.5)
CHLORIDE SERPL-SCNC: 104 MMOL/L (ref 95–110)
CHOLEST SERPL-MCNC: 154 MG/DL (ref 120–199)
CHOLEST/HDLC SERPL: 2.7 {RATIO} (ref 2–5)
CO2 SERPL-SCNC: 23 MMOL/L (ref 23–29)
CREAT SERPL-MCNC: 0.8 MG/DL (ref 0.5–1.4)
DIFFERENTIAL METHOD BLD: ABNORMAL
EOSINOPHIL # BLD AUTO: 0.1 K/UL (ref 0–0.5)
EOSINOPHIL NFR BLD: 1.1 % (ref 0–8)
ERYTHROCYTE [DISTWIDTH] IN BLOOD BY AUTOMATED COUNT: 13.6 % (ref 11.5–14.5)
EST. GFR  (NO RACE VARIABLE): >60 ML/MIN/1.73 M^2
ESTIMATED AVG GLUCOSE: 103 MG/DL (ref 68–131)
GLUCOSE SERPL-MCNC: 113 MG/DL (ref 70–110)
HBA1C MFR BLD: 5.2 % (ref 4–5.6)
HCT VFR BLD AUTO: 40.6 % (ref 37–48.5)
HDLC SERPL-MCNC: 57 MG/DL (ref 40–75)
HDLC SERPL: 37 % (ref 20–50)
HGB BLD-MCNC: 13.4 G/DL (ref 12–16)
IMM GRANULOCYTES # BLD AUTO: 0.03 K/UL (ref 0–0.04)
IMM GRANULOCYTES NFR BLD AUTO: 0.5 % (ref 0–0.5)
LDLC SERPL CALC-MCNC: 80.4 MG/DL (ref 63–159)
LYMPHOCYTES # BLD AUTO: 1.4 K/UL (ref 1–4.8)
LYMPHOCYTES NFR BLD: 21.4 % (ref 18–48)
MCH RBC QN AUTO: 31.6 PG (ref 27–31)
MCHC RBC AUTO-ENTMCNC: 33 G/DL (ref 32–36)
MCV RBC AUTO: 96 FL (ref 82–98)
MONOCYTES # BLD AUTO: 0.7 K/UL (ref 0.3–1)
MONOCYTES NFR BLD: 10.8 % (ref 4–15)
NEUTROPHILS # BLD AUTO: 4.4 K/UL (ref 1.8–7.7)
NEUTROPHILS NFR BLD: 65.9 % (ref 38–73)
NONHDLC SERPL-MCNC: 97 MG/DL
NRBC BLD-RTO: 0 /100 WBC
PLATELET # BLD AUTO: 293 K/UL (ref 150–450)
PMV BLD AUTO: 11.1 FL (ref 9.2–12.9)
POTASSIUM SERPL-SCNC: 4.1 MMOL/L (ref 3.5–5.1)
PROT SERPL-MCNC: 7.5 G/DL (ref 6–8.4)
RBC # BLD AUTO: 4.24 M/UL (ref 4–5.4)
SODIUM SERPL-SCNC: 138 MMOL/L (ref 136–145)
TRIGL SERPL-MCNC: 83 MG/DL (ref 30–150)
TSH SERPL DL<=0.005 MIU/L-ACNC: 1.12 UIU/ML (ref 0.4–4)
WBC # BLD AUTO: 6.64 K/UL (ref 3.9–12.7)

## 2024-06-12 PROCEDURE — 80061 LIPID PANEL: CPT | Performed by: FAMILY MEDICINE

## 2024-06-12 PROCEDURE — 80053 COMPREHEN METABOLIC PANEL: CPT | Performed by: FAMILY MEDICINE

## 2024-06-12 PROCEDURE — 83036 HEMOGLOBIN GLYCOSYLATED A1C: CPT | Performed by: FAMILY MEDICINE

## 2024-06-12 PROCEDURE — 85025 COMPLETE CBC W/AUTO DIFF WBC: CPT | Performed by: FAMILY MEDICINE

## 2024-06-12 PROCEDURE — 36415 COLL VENOUS BLD VENIPUNCTURE: CPT | Mod: PN | Performed by: FAMILY MEDICINE

## 2024-06-12 PROCEDURE — 84443 ASSAY THYROID STIM HORMONE: CPT | Performed by: FAMILY MEDICINE

## 2024-06-12 RX ORDER — SEMAGLUTIDE 2.68 MG/ML
2 INJECTION, SOLUTION SUBCUTANEOUS
Qty: 9 ML | Refills: 1 | OUTPATIENT
Start: 2024-06-12

## 2024-06-12 NOTE — TELEPHONE ENCOUNTER
No care due was identified.  [facility] Embedded Care Due Messages. Reference number: [id number].   6/11/2024 8:12:44 PM CDT

## 2024-06-13 ENCOUNTER — PATIENT MESSAGE (OUTPATIENT)
Dept: FAMILY MEDICINE | Facility: CLINIC | Age: 49
End: 2024-06-13
Payer: COMMERCIAL

## 2024-06-13 DIAGNOSIS — E11.8 DIABETES MELLITUS TYPE 2 WITH COMPLICATIONS: Primary | ICD-10-CM

## 2024-06-17 ENCOUNTER — TELEPHONE (OUTPATIENT)
Dept: PSYCHIATRY | Facility: CLINIC | Age: 49
End: 2024-06-17
Payer: COMMERCIAL

## 2024-06-19 ENCOUNTER — PATIENT MESSAGE (OUTPATIENT)
Dept: PSYCHIATRY | Facility: CLINIC | Age: 49
End: 2024-06-19
Payer: COMMERCIAL

## 2024-06-26 ENCOUNTER — OFFICE VISIT (OUTPATIENT)
Dept: PSYCHIATRY | Facility: CLINIC | Age: 49
End: 2024-06-26
Payer: COMMERCIAL

## 2024-06-26 DIAGNOSIS — F33.1 MODERATE EPISODE OF RECURRENT MAJOR DEPRESSIVE DISORDER: Primary | ICD-10-CM

## 2024-06-26 PROCEDURE — 99213 OFFICE O/P EST LOW 20 MIN: CPT | Mod: 95,,, | Performed by: PSYCHOLOGIST

## 2024-06-26 PROCEDURE — 3066F NEPHROPATHY DOC TX: CPT | Mod: CPTII,95,, | Performed by: PSYCHOLOGIST

## 2024-06-26 PROCEDURE — 3061F NEG MICROALBUMINURIA REV: CPT | Mod: CPTII,95,, | Performed by: PSYCHOLOGIST

## 2024-06-26 PROCEDURE — 1159F MED LIST DOCD IN RCRD: CPT | Mod: CPTII,95,, | Performed by: PSYCHOLOGIST

## 2024-06-26 PROCEDURE — 3044F HG A1C LEVEL LT 7.0%: CPT | Mod: CPTII,95,, | Performed by: PSYCHOLOGIST

## 2024-06-26 RX ORDER — ESCITALOPRAM OXALATE 10 MG/1
10 TABLET ORAL DAILY
Qty: 90 TABLET | Refills: 1 | Status: SHIPPED | OUTPATIENT
Start: 2024-06-26

## 2024-06-26 NOTE — PROGRESS NOTES
Outpatient Psychiatry Follow-Up Visit    Clinical Status of Patient: Outpatient (Ambulatory)  06/26/2024     Chief Complaint: 49 year old female presenting today for a follow-up.       Interval History and Content of Current Session:  Interim Events/Subjective Report/Content of Current Session:  follow-up appointment.    Pt is a 49 year old female with past psychiatric hx of depression, anxiety, and ADHD who presents for follow-up treatment. Pt notes 85-90% improvement in her mood. We discussed potentially going up on the dose but she would like to give it more time. Pt continues in therapy. Pt denied any other major changes or new stressors.    Past Psychiatric hx: Focalin    Past Medical hx:   Past Medical History:   Diagnosis Date    YAIR positive     Diabetes     Fatty liver     GERD (gastroesophageal reflux disease)     Hypertension     Sleep apnea     uses CPAP        Interim hx:  Medication changes last visit:   none  Anxiety: mild - improved  Depression: mild - improved     Denies suicidal/homicidal ideations.  Denies hopelessness/worthlessness.    Denies auditory/visual hallucinations      Alcohol: minimal, infrequent  Drug: occasional THC cartridge use  Caffeine: minimal use  Tobacco: pt denied      Review of Systems   PSYCHIATRIC: Pertinent items are noted in the narrative.        CONSTITUTIONAL: weight stable    Past Medical, Family and Social History: The patient's past medical, family and social history have been reviewed and updated as appropriate within the electronic medical record. See encounter notes.     Current Psychiatric Medication:  Lexapro 10mg once daily     Compliance: yes      Side effects: low libido     Risk Parameters:  Patient reports no suicidal ideation  Patient reports no homicidal ideation  Patient reports no self-injurious behavior  Patient reports no violent behavior     Exam (detailed: at least 9 elements; comprehensive: all 15 elements)   Constitutional  Vitals:  Most recent  vital signs, dated less than 90 days prior to this appointment, were reviewed.        General:  unremarkable, age appropriate, casual attire, good eye contact, good rapport       Musculoskeletal  Muscle Strength/Tone:  no flaccidity, no tremor    Gait & Station:  normal      Psychiatric                       Speech:  normal tone, normal rate, rhythm, and volume   Mood & Affect:   Depressed, anxious         Thought Process:   Goal directed; Linear    Associations:   intact   Thought Content:   No SI/HI, delusions, or paranoia, no AV/VH   Insight & Judgement:   Good, adequate to circumstances   Orientation:   grossly intact; alert and oriented x 4    Memory:  intact for content of interview    Language:  grossly intact, can repeat    Attention Span  : Grossly intact for content of interview   Fund of Knowledge:   intact and appropriate to age and level of education        Assessment and Diagnosis   Status/Progress: improving     Impression: Pt appears to struggle with recurrent depression and secondary anxiety. Pt has also been evaluated and treated for ADHD in the past. Pt should continue in therapy, continue in psychiatric medication management, and increase her cardio exercise regimen.     Diagnosis(es):   1) Major Depressive Disorder, recurrent, moderate  2) ADHD, predominately inattentive type    Intervention/Counseling/Treatment Plan   Medication Management:      1. Continue Lexapro 10mg once daily     2. Continue in counseling     3. Call to report any worsening of symptoms or problems with the medication. Pt instructed to go to ER with thoughts of harming self, others    Psychotherapy:   Target symptoms:   Why chosen therapy is appropriate versus another modality: CBT used; relevant to diagnosis, patient responds to this modality  Outcome monitoring methods: self-report, observation  Therapeutic intervention type: Cognitive Behavioral Therapy  Topics discussed/themes: building skills sets for symptom  management, symptom recognition, nutrition, exercise  The patient's response to the intervention is good  Patient's response to treatment is: good.   The patient's progress toward treatment goals: improving     Return to clinic: 6 months    -Cognitive-Behavioral/Supportive therapy and psychoeducation provided  -R/B/SE's of medications discussed with the pt who expresses understanding and chooses to take medications as prescribed.   -Pt instructed to call clinic, 911 or go to nearest emergency room if sxs worsen or pt is in   crisis. The pt expresses understanding.    Saul Cronin, PhD, MP

## 2024-06-28 ENCOUNTER — PATIENT MESSAGE (OUTPATIENT)
Dept: PSYCHIATRY | Facility: CLINIC | Age: 49
End: 2024-06-28
Payer: COMMERCIAL

## 2024-07-09 ENCOUNTER — HOSPITAL ENCOUNTER (OUTPATIENT)
Dept: RADIOLOGY | Facility: HOSPITAL | Age: 49
Discharge: HOME | End: 2024-07-09
Attending: FAMILY MEDICINE
Payer: COMMERCIAL

## 2024-07-09 ENCOUNTER — OFFICE VISIT (OUTPATIENT)
Dept: PSYCHIATRY | Facility: CLINIC | Age: 49
End: 2024-07-09
Payer: COMMERCIAL

## 2024-07-09 DIAGNOSIS — Z00.00 ANNUAL PHYSICAL EXAM: ICD-10-CM

## 2024-07-09 DIAGNOSIS — F33.1 MODERATE EPISODE OF RECURRENT MAJOR DEPRESSIVE DISORDER: Primary | ICD-10-CM

## 2024-07-09 PROCEDURE — 90834 PSYTX W PT 45 MINUTES: CPT | Mod: 95,,, | Performed by: PSYCHOLOGIST

## 2024-07-09 PROCEDURE — 77067 SCR MAMMO BI INCL CAD: CPT | Mod: TC,PO

## 2024-07-09 PROCEDURE — 77067 SCR MAMMO BI INCL CAD: CPT | Mod: 26,,, | Performed by: RADIOLOGY

## 2024-07-09 PROCEDURE — 3066F NEPHROPATHY DOC TX: CPT | Mod: CPTII,95,, | Performed by: PSYCHOLOGIST

## 2024-07-09 PROCEDURE — 77063 BREAST TOMOSYNTHESIS BI: CPT | Mod: 26,,, | Performed by: RADIOLOGY

## 2024-07-09 PROCEDURE — 3061F NEG MICROALBUMINURIA REV: CPT | Mod: CPTII,95,, | Performed by: PSYCHOLOGIST

## 2024-07-09 PROCEDURE — 3044F HG A1C LEVEL LT 7.0%: CPT | Mod: CPTII,95,, | Performed by: PSYCHOLOGIST

## 2024-07-09 NOTE — PROGRESS NOTES
Primary Care Behavioral Health: Follow-Up  Patient Name: Ana Dorantes  Date:  07/09/2024  Site:  [facility]  Referral source: Gina Barrios MD    The patient location is:  [address] [place] [address]  [place] [id number]  The patient phone number is: [phone]  Visit type: Virtual visit with synchronous audio and video  Each patient to whom he or she provides medical services by telemedicine is:  (1) informed of the relationship between the physician and patient and the respective role of any other health care provider with respect to management of the patient; and (2) notified that he or she may decline to receive medical services by telemedicine and may withdraw from such care at any time.    Chief complaint/reason for encounter:  Anxiety, Caregiver Stress    History of present illness:  Ms. Ana Dorantes is a 49 y.o. White female referred by Gina Barrios MD.  Patient was seen, examined and chart was reviewed.  Patient notes overall she has been experiencing overall improvement in symptoms of depression. Patient notes she has been experiencing some symptoms of fatigue. Patient encouraged to talk to PCP about fatigue. Patient notes she has been feeling frustrated with the heat and having to be inside more. Patient discusses additional stress in relationship with her Mother and their long term complex relationship dynamics.    Past Medical History:   Diagnosis Date    YAIR positive     Diabetes     Fatty liver     GERD (gastroesophageal reflux disease)     Hypertension     Sleep apnea     uses CPAP       Current Outpatient Medications:     blood sugar diagnostic Strp, 1 each by Misc.(Non-Drug; Combo Route) route once daily., Disp: 30 strip, Rfl: 6    blood-glucose meter (BLOOD GLUCOSE MONITORING) kit, Use as instructed, Disp: 1 each, Rfl: 0    EPINEPHrine (EPIPEN) 0.3 mg/0.3 mL AtIn, Inject 0.3 mLs (0.3 mg total) into the muscle once. for 1 dose, Disp: 2 Device, Rfl: 0    ergocalciferol,  vitamin D2, (VITAMIN D ORAL), Take by mouth., Disp: , Rfl:     EScitalopram oxalate (LEXAPRO) 10 MG tablet, Take 1 tablet (10 mg total) by mouth once daily., Disp: 90 tablet, Rfl: 1    ondansetron (ZOFRAN) 8 MG tablet, Take 1 tablet (8 mg total) by mouth every 8 (eight) hours as needed for Nausea., Disp: 30 tablet, Rfl: 1    semaglutide (OZEMPIC) 2 mg/dose (8 mg/3 mL) PnIj, Inject 2 mg into the skin every 7 days., Disp: 9 mL, Rfl: 1    spironolactone (ALDACTONE) 50 MG tablet, Take 1 tablet (50 mg total) by mouth once daily., Disp: 90 tablet, Rfl: 1    Psychiatric symptoms:  Depression:  Patient endorses symptoms of  low motivation, decreased desire to engage in tasks, sleep difficulty, difficulty with attention/concentration however notes overall these have demonstrated improvement in intensity and frequency. She denied suicidal ideation.  Ewa/Hypomania:  Denied.  She denied periods of elevated mood or abnormally increased energy or goal-directed activity.  Anxiety:  Patient notes some nervousness, difficulty relaxing and irritability at times however notes overall symptoms have demonstrated improvement in intensity and frequency.  Thoughts:  Denied delusions, hallucinations.  Suicidal thoughts/behaviors: Patient denied suicidal and homicidal ideation, plan and intent.  Patient advised to call [phone], 911/and or present to the ED if she experienced suicidal or homicidal ideation, plan or intent. Patient names her family as factors preventing and protecting her from harming herself. Patient denies owning a firearm.  Self-injury:  Patient denies.  Sleep: Patient notes she falls asleep around 2-4am and wakes up  around 11:30am.   Trauma: Patient notes her Father passed away when she was 3 years old.     Mental Status Exam:  General appearance:  appears stated age, neatly dressed, well groomed  Speech:  Clear and intelligible, normal rate, normal tone, normal pitch, normal volume  Level of cooperation:   cooperative  Thought processes:  Linear, logical, goal-directed  Mood:  Generally euthymic  Thought content:  Relevant and appropriate  Affect:  appropriate and congruent to mood  Orientation:  oriented to person, place, situation and date  Memory/Attention/Concentration:  No gross cognitive deficits made evident during conversation.  Judgment and insight: fair  Language:  intact        7/9/2024     9:45 AM 5/16/2024     1:59 PM 4/30/2024    11:35 AM 4/10/2024     6:45 PM 4/2/2024    10:50 AM 3/18/2024     4:19 PM 3/1/2024     9:31 AM   Results of the PHQ8   Little interest or pleasure in doing things Several days Several days Several days Several days More than half the days More than half the days Not at all   Feeling down, depressed, or hopeless Not at all Several days Several days Several days Several days Several days Several days   Trouble falling or staying asleep, or sleeping too much More than half the days Several days Several days Several days Several days More than half the days Several days   Feeling tired or having little energy More than half the days Several days Several days Several days Nearly every day Nearly every day More than half the days   Poor appetite or overeating Not at all Several days Several days Several days Several days Several days Not at all   Feeling bad about yourself - or that you are a failure or have let yourself or your family down Not at all Several days Not at all More than half the days More than half the days More than half the days More than half the days   Trouble concentrating on things, such as reading the newspaper or watching television More than half the days More than half the days More than half the days More than half the days Nearly every day Nearly every day Nearly every day   Moving or speaking so slowly that other people could have noticed. Or the opposite - being so fidgety or restless that you have been moving around a lot more than usual More than half  the days Several days Several days More than half the days More than half the days Nearly every day More than half the days   Total Score  9 9 8 11 15 17 11             7/9/2024     9:44 AM 5/16/2024     1:58 PM 4/30/2024    11:33 AM   GAD7   1. Feeling nervous, anxious, or on edge? 1 0 1   2. Not being able to stop or control worrying? 0 0 0   3. Worrying too much about different things? 0 0 0   4. Trouble relaxing? 1 1 1   5. Being so restless that it is hard to sit still? 0 0 0   6. Becoming easily annoyed or irritable? 1 0 1   7. Feeling afraid as if something awful might happen? 0 0 0   8. If you checked off any problems, how difficult have these problems made it for you to do your work, take care of things at home, or get along with other people? 1     KARTHIK-7 Score 3 1 3     Impression: Patient presents today discussing symptoms of anxiety and depression.  Patient notes overall experiencing an ongoing improvement in intensity and frequency of symptoms of anxiety and depression. Patient discusses current frustrations and stress being long standing complex relationship with her Mother.    Diagnosis:  1. Moderate episode of recurrent major depressive disorder          Plan:    1.  Patient provided psychoeducation on sleep hygiene, anxiety, depression and ADHD.  2.  Methods of management of episodes of increased stress; change of setting, deep breathing/relaxation, distraction.  3. Stress management discussed.  4.  Setting limits and boundaries discussed.  5.  Communication skills discussed.  6.  Patient to follow-up in 3 weeks.    Length of Session:  39 min spent face to face and 20 min spent in non face to face tasks

## 2024-07-31 RX ORDER — ESCITALOPRAM OXALATE 10 MG/1
TABLET ORAL
Refills: 0 | OUTPATIENT
Start: 2024-07-31

## 2024-07-31 RX ORDER — SPIRONOLACTONE 50 MG/1
TABLET, FILM COATED ORAL
Refills: 0 | OUTPATIENT
Start: 2024-07-31

## 2024-07-31 NOTE — TELEPHONE ENCOUNTER
Refill Decision Note   Ana Dorantes  is requesting a refill authorization.  Brief Assessment and Rationale for Refill:  Quick Discontinue     Medication Therapy Plan:    Pharmacy is requesting new scripts for the following medications without required information, (sig/ frequency/qty/etc)      Medication Reconciliation Completed: No     Comments: Pharmacies have been requesting medications for patients without required information, (sig, frequency, qty, etc.). In addition, requests are sent for medication(s) pt. are currently not taking, and medications patients have never taken.    We have spoken to the pharmacies about these request types and advised their teams previously that we are unable to assess these New Script requests and require all details for these requests. This is a known issue and has been reported.     Note composed:2:29 PM 07/31/2024

## 2024-07-31 NOTE — TELEPHONE ENCOUNTER
No care due was identified.  [facility] Embedded Care Due Messages. Reference number: [id number].   7/31/2024 11:30:54 AM CDT

## 2024-08-06 ENCOUNTER — OFFICE VISIT (OUTPATIENT)
Dept: PSYCHIATRY | Facility: CLINIC | Age: 49
End: 2024-08-06
Payer: COMMERCIAL

## 2024-08-06 ENCOUNTER — IMMUNIZATION (OUTPATIENT)
Dept: INTERNAL MEDICINE | Facility: CLINIC | Age: 49
End: 2024-08-06
Payer: COMMERCIAL

## 2024-08-06 DIAGNOSIS — F33.1 MODERATE EPISODE OF RECURRENT MAJOR DEPRESSIVE DISORDER: Primary | ICD-10-CM

## 2024-08-06 DIAGNOSIS — Z23 NEED FOR VACCINATION: Primary | ICD-10-CM

## 2024-08-06 PROCEDURE — 90480 ADMN SARSCOV2 VAC 1/ONLY CMP: CPT | Mod: S$GLB,,, | Performed by: INTERNAL MEDICINE

## 2024-08-06 PROCEDURE — 3066F NEPHROPATHY DOC TX: CPT | Mod: CPTII,95,, | Performed by: PSYCHOLOGIST

## 2024-08-06 PROCEDURE — 3061F NEG MICROALBUMINURIA REV: CPT | Mod: CPTII,95,, | Performed by: PSYCHOLOGIST

## 2024-08-06 PROCEDURE — 91322 SARSCOV2 VAC 50 MCG/0.5ML IM: CPT | Mod: S$GLB,,, | Performed by: INTERNAL MEDICINE

## 2024-08-06 PROCEDURE — 90832 PSYTX W PT 30 MINUTES: CPT | Mod: 95,,, | Performed by: PSYCHOLOGIST

## 2024-08-06 PROCEDURE — 3044F HG A1C LEVEL LT 7.0%: CPT | Mod: CPTII,95,, | Performed by: PSYCHOLOGIST

## 2024-08-06 NOTE — PROGRESS NOTES
Primary Care Behavioral Health: Follow-Up  Patient Name: Ana Dorantes  Date:  08/06/2024  Site:  [facility]  Referral source: Gina Barrios MD    The patient location is:  [address] [place] [address]  [place] [id number]  The patient phone number is: [phone]  Visit type: Virtual visit with synchronous audio and video  Each patient to whom he or she provides medical services by telemedicine is:  (1) informed of the relationship between the physician and patient and the respective role of any other health care provider with respect to management of the patient; and (2) notified that he or she may decline to receive medical services by telemedicine and may withdraw from such care at any time.    Chief complaint/reason for encounter:  Anxiety, Caregiver Stress    History of present illness:  Ms. Ana Dorantes is a 49 y.o. White female referred by Gina Barrios MD.  Patient was seen, examined and chart was reviewed.  Patient presents today discussing ongoing stress in relationship with her Mother. Patient discusses their relationships has lacked boundaries for many years. Patient notes she has started to implement limits and boundaries and notes this has helped manage her stress.    Past Medical History:   Diagnosis Date    YAIR positive     Diabetes     Fatty liver     GERD (gastroesophageal reflux disease)     Hypertension     Sleep apnea     uses CPAP       Current Outpatient Medications:     blood sugar diagnostic Strp, 1 each by Misc.(Non-Drug; Combo Route) route once daily., Disp: 30 strip, Rfl: 6    blood-glucose meter (BLOOD GLUCOSE MONITORING) kit, Use as instructed, Disp: 1 each, Rfl: 0    EPINEPHrine (EPIPEN) 0.3 mg/0.3 mL AtIn, Inject 0.3 mLs (0.3 mg total) into the muscle once. for 1 dose, Disp: 2 Device, Rfl: 0    ergocalciferol, vitamin D2, (VITAMIN D ORAL), Take by mouth., Disp: , Rfl:     EScitalopram oxalate (LEXAPRO) 10 MG tablet, Take 1 tablet (10 mg total) by mouth once  daily., Disp: 90 tablet, Rfl: 1    ondansetron (ZOFRAN) 8 MG tablet, Take 1 tablet (8 mg total) by mouth every 8 (eight) hours as needed for Nausea., Disp: 30 tablet, Rfl: 1    semaglutide (OZEMPIC) 2 mg/dose (8 mg/3 mL) PnIj, Inject 2 mg into the skin every 7 days., Disp: 9 mL, Rfl: 1    spironolactone (ALDACTONE) 50 MG tablet, Take 1 tablet (50 mg total) by mouth once daily., Disp: 90 tablet, Rfl: 1    Psychiatric symptoms:  Depression:  Patient endorses symptoms of  low motivation, decreased desire to engage in tasks, fatigue, difficulty with attention/concentration however notes overall these have demonstrated improvement in intensity and frequency. She denied suicidal ideation.  Ewa/Hypomania:  Denied.  She denied periods of elevated mood or abnormally increased energy or goal-directed activity.  Anxiety:  Patient notes some symptoms of irritability at times however notes overall symptoms have demonstrated improvement in intensity and frequency.  Thoughts:  Denied delusions, hallucinations.  Suicidal thoughts/behaviors: Patient denied suicidal and homicidal ideation, plan and intent.  Patient advised to call [phone], 911/and or present to the ED if she experienced suicidal or homicidal ideation, plan or intent. Patient names her family as factors preventing and protecting her from harming herself. Patient denies owning a firearm.  Self-injury:  Patient denies.  Sleep: Patient notes she falls asleep around 2-4am and wakes up  around 11:30am.   Trauma: Patient notes her Father passed away when she was 3 years old.     Mental Status Exam:  General appearance:  appears stated age, neatly dressed, well groomed  Speech:  Clear and intelligible, normal rate, normal tone, normal pitch, normal volume  Level of cooperation:  cooperative  Thought processes:  Linear, logical, goal-directed  Mood:  Generally euthymic  Thought content:  Relevant and appropriate  Affect:  appropriate and congruent to mood  Orientation:   oriented to person, place, situation and date  Memory/Attention/Concentration:  No gross cognitive deficits made evident during conversation.  Judgment and insight: fair  Language:  intact        8/6/2024    10:55 AM 7/9/2024     9:45 AM 5/16/2024     1:59 PM 4/30/2024    11:35 AM 4/10/2024     6:45 PM 4/2/2024    10:50 AM 3/18/2024     4:19 PM   Results of the PHQ8   Little interest or pleasure in doing things Several days Several days Several days Several days Several days More than half the days More than half the days   Feeling down, depressed, or hopeless Not at all Not at all Several days Several days Several days Several days Several days   Trouble falling or staying asleep, or sleeping too much Not at all More than half the days Several days Several days Several days Several days More than half the days   Feeling tired or having little energy Nearly every day More than half the days Several days Several days Several days Nearly every day Nearly every day   Poor appetite or overeating Several days Not at all Several days Several days Several days Several days Several days   Feeling bad about yourself - or that you are a failure or have let yourself or your family down Several days Not at all Several days Not at all More than half the days More than half the days More than half the days   Trouble concentrating on things, such as reading the newspaper or watching television Nearly every day More than half the days More than half the days More than half the days More than half the days Nearly every day Nearly every day   Moving or speaking so slowly that other people could have noticed. Or the opposite - being so fidgety or restless that you have been moving around a lot more than usual Nearly every day More than half the days Several days Several days More than half the days More than half the days Nearly every day   Total Score  12 9 9 8 11 15 17             8/6/2024    10:53 AM 7/9/2024     9:44 AM 5/16/2024      1:58 PM   GAD7   1. Feeling nervous, anxious, or on edge? 0 1 0   2. Not being able to stop or control worrying? 0 0 0   3. Worrying too much about different things? 0 0 0   4. Trouble relaxing? 0 1 1   5. Being so restless that it is hard to sit still? 0 0 0   6. Becoming easily annoyed or irritable? 1 1 0   7. Feeling afraid as if something awful might happen? 0 0 0   8. If you checked off any problems, how difficult have these problems made it for you to do your work, take care of things at home, or get along with other people? 0 1    KARTHIK-7 Score 1 3 1     Impression: Patient presents today discussing symptoms of anxiety and depression.  Patient notes overall experiencing an ongoing improvement in intensity and frequency of symptoms of anxiety and depression. Patient discusses current frustrations and stress being long standing complex relationship with her Mother.    Diagnosis:  1. Moderate episode of recurrent major depressive disorder          Plan:    1.  Patient provided psychoeducation on sleep hygiene, anxiety, depression and ADHD.  2.  Methods of management of episodes of increased stress; change of setting, deep breathing/relaxation, distraction.  3. Stress management discussed.  4.  Setting limits and boundaries discussed.  5.  Communication skills discussed.  6.  Patient to follow-up in 3 weeks.    Length of Session:  28 min spent face to face and 20 min spent in non face to face tasks

## 2024-08-14 ENCOUNTER — TELEPHONE (OUTPATIENT)
Dept: FAMILY MEDICINE | Facility: CLINIC | Age: 49
End: 2024-08-14
Payer: COMMERCIAL

## 2024-08-14 NOTE — TELEPHONE ENCOUNTER
----- Message from Dayne Fontanez sent at 8/13/2024  4:31 PM CDT -----  Contact: Self  Type:  Sooner Appointment Request    Caller is requesting a sooner appointment.  Caller declined first available appointment listed below.  Caller will not accept being placed on the waitlist and is requesting a message be sent to doctor.    Name of Caller:  Patient    Would the patient rather a call back or a response via [facility]? Call Back  Best Call Back Number:  [phone]  Additional Information:  Patient accidentally missed her appt today for her 2nd Shingrix, she went to the wrong doctors office. She is requesting to reschedule that appt, she feels terrible about missing it

## 2024-08-17 LAB
LEFT EYE DM RETINOPATHY: NEGATIVE
RIGHT EYE DM RETINOPATHY: NEGATIVE

## 2024-08-19 ENCOUNTER — PATIENT OUTREACH (OUTPATIENT)
Dept: ADMINISTRATIVE | Facility: HOSPITAL | Age: 49
End: 2024-08-19
Payer: COMMERCIAL

## 2024-08-19 NOTE — PROGRESS NOTES
Population Health Chart Review & Patient Outreach Details      Additional [facility] Health Notes:               Updates Requested / Reviewed:      Updated Care Coordination Note         Health Maintenance Topics Overdue:      VB Score: 0     Patient is not due for any topics at this time.                       Health Maintenance Topic(s) Outreach Outcomes & Actions Taken:    Eye Exam - Outreach Outcomes & Actions Taken  : Diabetic Eye External Records Uploaded, Care Team & History Updated if Applicable

## 2024-08-28 ENCOUNTER — CLINICAL SUPPORT (OUTPATIENT)
Dept: FAMILY MEDICINE | Facility: CLINIC | Age: 49
End: 2024-08-28
Payer: COMMERCIAL

## 2024-08-28 DIAGNOSIS — Z23 IMMUNIZATION DUE: Primary | ICD-10-CM

## 2024-08-28 PROCEDURE — 90471 IMMUNIZATION ADMIN: CPT | Mod: S$GLB,,, | Performed by: FAMILY MEDICINE

## 2024-08-28 PROCEDURE — 90750 HZV VACC RECOMBINANT IM: CPT | Mod: S$GLB,,, | Performed by: FAMILY MEDICINE

## 2024-09-27 ENCOUNTER — OFFICE VISIT (OUTPATIENT)
Dept: FAMILY MEDICINE | Facility: CLINIC | Age: 49
End: 2024-09-27
Payer: COMMERCIAL

## 2024-09-27 VITALS
DIASTOLIC BLOOD PRESSURE: 76 MMHG | WEIGHT: 175.5 LBS | BODY MASS INDEX: 29.24 KG/M2 | SYSTOLIC BLOOD PRESSURE: 118 MMHG | HEIGHT: 65 IN | HEART RATE: 80 BPM | OXYGEN SATURATION: 95 % | TEMPERATURE: 98 F | RESPIRATION RATE: 16 BRPM

## 2024-09-27 DIAGNOSIS — Z23 INFLUENZA VACCINATION GIVEN: ICD-10-CM

## 2024-09-27 DIAGNOSIS — R07.89 MUSCULAR CHEST PAIN: Primary | ICD-10-CM

## 2024-09-27 DIAGNOSIS — S29.012A MUSCLE STRAIN OF RIGHT UPPER BACK, INITIAL ENCOUNTER: ICD-10-CM

## 2024-09-27 RX ORDER — METHYLPREDNISOLONE 4 MG/1
TABLET ORAL
Qty: 21 EACH | Refills: 0 | Status: SHIPPED | OUTPATIENT
Start: 2024-09-27 | End: 2024-10-18

## 2024-09-27 RX ORDER — TIZANIDINE 4 MG/1
4 TABLET ORAL NIGHTLY PRN
Qty: 30 TABLET | Refills: 0 | Status: SHIPPED | OUTPATIENT
Start: 2024-09-27 | End: 2024-10-27

## 2024-09-27 NOTE — PROGRESS NOTES
Subjective:       Patient ID: Ana Dorantes is a 49 y.o. female.    Medication List with Changes/Refills   New Medications    METHYLPREDNISOLONE (MEDROL DOSEPACK) 4 MG TABLET    use as directed    TIZANIDINE (ZANAFLEX) 4 MG TABLET    Take 1 tablet (4 mg total) by mouth nightly as needed (muscle pain).   Current Medications    BLOOD SUGAR DIAGNOSTIC STRP    1 each by Misc.(Non-Drug; Combo Route) route once daily.    BLOOD-GLUCOSE METER (BLOOD GLUCOSE MONITORING) KIT    Use as instructed    EPINEPHRINE (EPIPEN) 0.3 MG/0.3 ML ATIN    Inject 0.3 mLs (0.3 mg total) into the muscle once. for 1 dose    ERGOCALCIFEROL, VITAMIN D2, (VITAMIN D ORAL)    Take by mouth.    ESCITALOPRAM OXALATE (LEXAPRO) 10 MG TABLET    Take 1 tablet (10 mg total) by mouth once daily.    ONDANSETRON (ZOFRAN) 8 MG TABLET    Take 1 tablet (8 mg total) by mouth every 8 (eight) hours as needed for Nausea.    SEMAGLUTIDE (OZEMPIC) 2 MG/DOSE (8 MG/3 ML) PNIJ    Inject 2 mg into the skin every 7 days.    SPIRONOLACTONE (ALDACTONE) 50 MG TABLET    Take 1 tablet (50 mg total) by mouth once daily.       Chief Complaint: Chest Pain (Upper right side on going for 3wks)  She presents with 2-3 weeks of right sided chest pain that hurts when she takes a deep breath or move. She also has pain on the right side of her upper back. No shortness of breath with rest or exertion. The pain will be sharp rated 6/10 but only last seconds.  No recent illness. She does have a mild cough. No fevers. No wheezing.  She has no known inciting event.  It hurts when she pushes on her back muscle and chest muscles.     Review of Systems   Constitutional:  Negative for activity change, appetite change, chills, fatigue and fever.   HENT:  Negative for congestion, ear discharge, ear pain, mouth sores, postnasal drip, rhinorrhea, sinus pressure and sore throat.    Eyes:  Negative for pain, discharge and redness.   Respiratory:  Positive for cough. Negative for chest tightness,  "shortness of breath and wheezing.    Cardiovascular:  Positive for chest pain.   Gastrointestinal:  Negative for abdominal pain, constipation, diarrhea, nausea and vomiting.   Genitourinary:  Negative for dysuria.   Musculoskeletal:  Negative for arthralgias and neck stiffness.   Skin:  Negative for rash.   Neurological:  Negative for headaches.   Hematological:  Negative for adenopathy.       Objective:      Vitals:    09/27/24 0938   BP: 118/76   BP Location: Right arm   Patient Position: Sitting   Pulse: 80   Resp: 16   Temp: 98.1 °F (36.7 °C)   TempSrc: Temporal   SpO2: 95%   Weight: 79.6 kg (175 lb 7.8 oz)   Height: 5' 5" (1.651 m)     Body mass index is 29.2 kg/m².  Physical Exam    General appearance: alert, no acute distress  Neck: supple, FROM, no masses, no tenderness  Lymph: no posterior or cervical adenopathy  Lungs: no distress, no retractions, clear to ascultation bilaterally, no wheezing, no rales, no rhonchi  Heart:: Regular rate and rhythm, no murmur  Chest: tenderness on palpation of right anterior chest with increased tenderness with ROM testing of her pectoralis muscles  Abdomen: soft, non-tender, no guarding, no rebound, no peritoneal signs, bowel sounds normal, no hepatosplenomegaly, no masses  Skin: no rashes or lesion  Perfusion: good capillary refill, normal pulses  BAck: focal tenderness on palpation of her right subscapular muscles with muscle spasms.     Assessment:       1. Muscular chest pain    2. Muscle strain of right upper back, initial encounter    3. Influenza vaccination given        Plan:       Muscular chest pain with Muscle strain of right upper back, initial encounter  Given reassurance. Exam very consistent with muscular pain and will give her medrol dose pack and muscle relaxer. Consider referral to PT.   -     methylPREDNISolone (MEDROL DOSEPACK) 4 mg tablet; use as directed  Dispense: 21 each; Refill: 0  -     tiZANidine (ZANAFLEX) 4 MG tablet; Take 1 tablet (4 mg total) " by mouth nightly as needed (muscle pain).  Dispense: 30 tablet; Refill: 0    Influenza vaccination given  -     (VFC) influenza (Flulaval, Fluzone, Fluarix) 45 mcg/0.5 mL IM vaccine (> or = 6 mo) 0.5 mL    Follow up if symptoms worsen or fail to improve.

## 2024-11-19 DIAGNOSIS — E11.8 DIABETES MELLITUS TYPE 2 WITH COMPLICATIONS: ICD-10-CM

## 2024-11-19 RX ORDER — SEMAGLUTIDE 2.68 MG/ML
INJECTION, SOLUTION SUBCUTANEOUS
Qty: 9 ML | Refills: 0 | Status: SHIPPED | OUTPATIENT
Start: 2024-11-19

## 2024-11-20 NOTE — TELEPHONE ENCOUNTER
Care Due:                  Date            Visit Type   Department     Provider  --------------------------------------------------------------------------------                                EP -                              PRIMARY      [facility]  Last Visit: 09-      CARE ([facility])   MEDICINE       Nikki Emery                              EP -                              PRIMARY      [facility]    Gina Marte Pebblesmaryana  Next Visit: 12-      CARE ([facility])   MEDICINE       Anger                                                            Last  Test          Frequency    Reason                     Performed    Due Date  --------------------------------------------------------------------------------    HBA1C.......  6 months...  semaglutide..............  06-   12-    [facility] Embedded Care Due Messages. Reference number: [id number].   11/19/2024 9:33:59 PM CST

## 2024-11-20 NOTE — TELEPHONE ENCOUNTER
Refill Decision Note   Ana Jayna  is requesting a refill authorization.  Brief Assessment and Rationale for Refill:  Approve     Medication Therapy Plan:  FLOS;MAYRA      Comments:     Note composed:10:01 PM 11/19/2024

## 2024-12-30 RX ORDER — ESCITALOPRAM OXALATE 10 MG/1
10 TABLET ORAL DAILY
Qty: 90 TABLET | Refills: 0 | Status: SHIPPED | OUTPATIENT
Start: 2024-12-30

## 2024-12-30 NOTE — TELEPHONE ENCOUNTER
No care due was identified.  [facility] Embedded Care Due Messages. Reference number: [id number].   12/30/2024 12:20:23 PM CST

## 2025-01-14 ENCOUNTER — PATIENT MESSAGE (OUTPATIENT)
Dept: PSYCHIATRY | Facility: CLINIC | Age: 50
End: 2025-01-14
Payer: COMMERCIAL

## 2025-01-15 ENCOUNTER — OFFICE VISIT (OUTPATIENT)
Dept: PSYCHIATRY | Facility: CLINIC | Age: 50
End: 2025-01-15
Payer: COMMERCIAL

## 2025-01-15 VITALS
WEIGHT: 176.13 LBS | SYSTOLIC BLOOD PRESSURE: 132 MMHG | DIASTOLIC BLOOD PRESSURE: 85 MMHG | HEART RATE: 86 BPM | BODY MASS INDEX: 29.34 KG/M2 | HEIGHT: 65 IN

## 2025-01-15 DIAGNOSIS — F33.1 MODERATE EPISODE OF RECURRENT MAJOR DEPRESSIVE DISORDER: ICD-10-CM

## 2025-01-15 DIAGNOSIS — F90.0 ATTENTION DEFICIT HYPERACTIVITY DISORDER (ADHD), PREDOMINANTLY INATTENTIVE TYPE: Primary | ICD-10-CM

## 2025-01-15 PROCEDURE — 3075F SYST BP GE 130 - 139MM HG: CPT | Mod: CPTII,S$GLB,, | Performed by: PSYCHOLOGIST

## 2025-01-15 PROCEDURE — 99214 OFFICE O/P EST MOD 30 MIN: CPT | Mod: S$GLB,,, | Performed by: PSYCHOLOGIST

## 2025-01-15 PROCEDURE — 1159F MED LIST DOCD IN RCRD: CPT | Mod: CPTII,S$GLB,, | Performed by: PSYCHOLOGIST

## 2025-01-15 PROCEDURE — 3008F BODY MASS INDEX DOCD: CPT | Mod: CPTII,S$GLB,, | Performed by: PSYCHOLOGIST

## 2025-01-15 PROCEDURE — 99999 PR PBB SHADOW E&M-EST. PATIENT-LVL III: CPT | Mod: PBBFAC,,, | Performed by: PSYCHOLOGIST

## 2025-01-15 PROCEDURE — 3079F DIAST BP 80-89 MM HG: CPT | Mod: CPTII,S$GLB,, | Performed by: PSYCHOLOGIST

## 2025-01-15 PROCEDURE — 90833 PSYTX W PT W E/M 30 MIN: CPT | Mod: S$GLB,,, | Performed by: PSYCHOLOGIST

## 2025-01-15 RX ORDER — ESCITALOPRAM OXALATE 20 MG/1
20 TABLET ORAL DAILY
Qty: 30 TABLET | Refills: 1 | Status: SHIPPED | OUTPATIENT
Start: 2025-01-15 | End: 2026-01-15

## 2025-01-15 NOTE — PROGRESS NOTES
Outpatient Psychiatry Follow-Up Visit    Clinical Status of Patient: Outpatient (Ambulatory)  01/15/2025     Chief Complaint: 49 year old female presenting today for a follow-up.       Interval History and Content of Current Session:  Interim Events/Subjective Report/Content of Current Session:  follow-up appointment.    Pt is a 49 year old female with past psychiatric hx of depression, anxiety, and ADHD who presents for follow-up treatment. Pt has been struggling more with depression. Pt continues to care for her 93 year old mother and three dogs. Pt is struggling with caregiver burnout. Pt also is not exercising. Pt denied any other major changes or new stressors.    Past Psychiatric hx: Focalin    Past Medical hx:   Past Medical History:   Diagnosis Date    YAIR positive     Diabetes     Fatty liver     GERD (gastroesophageal reflux disease)     Hypertension     Sleep apnea     uses CPAP        Interim hx:  Medication changes last visit:   none  Anxiety: mild - unchanged  Depression: moderate - increased     Denies suicidal/homicidal ideations.  Denies hopelessness/worthlessness.    Denies auditory/visual hallucinations      Alcohol: minimal, infrequent  Drug: occasional THC cartridge use  Caffeine: minimal use  Tobacco: pt denied      Review of Systems   PSYCHIATRIC: Pertinent items are noted in the narrative.        CONSTITUTIONAL: weight stable    Past Medical, Family and Social History: The patient's past medical, family and social history have been reviewed and updated as appropriate within the electronic medical record. See encounter notes.     Current Psychiatric Medication:  Lexapro 10mg once daily     Compliance: yes      Side effects: low libido     Risk Parameters:  Patient reports no suicidal ideation  Patient reports no homicidal ideation  Patient reports no self-injurious behavior  Patient reports no violent behavior     Exam (detailed: at least 9 elements; comprehensive: all 15 elements)    Constitutional  Vitals:  Most recent vital signs, dated less than 90 days prior to this appointment, were reviewed. Pulse:  [86]   BP: (132)/(85)       General:  unremarkable, age appropriate, casual attire, good eye contact, good rapport       Musculoskeletal  Muscle Strength/Tone:  no flaccidity, no tremor    Gait & Station:  normal      Psychiatric                       Speech:  normal tone, normal rate, rhythm, and volume   Mood & Affect:   Depressed, anxious         Thought Process:   Goal directed; Linear    Associations:   intact   Thought Content:   No SI/HI, delusions, or paranoia, no AV/VH   Insight & Judgement:   Good, adequate to circumstances   Orientation:   grossly intact; alert and oriented x 4    Memory:  intact for content of interview    Language:  grossly intact, can repeat    Attention Span  : Grossly intact for content of interview   Fund of Knowledge:   intact and appropriate to age and level of education        Assessment and Diagnosis   Status/Progress: increasing distress     Impression: Pt appears to struggle with recurrent depression and secondary anxiety. Pt has also been evaluated and treated for ADHD in the past. Pt should continue in therapy, continue in psychiatric medication management, and increase her cardio exercise regimen.     Diagnosis(es):   1) Major Depressive Disorder, recurrent, moderate  2) ADHD, predominately inattentive type    Intervention/Counseling/Treatment Plan   Medication Management:      1. Increase Lexapro dosage to 20mg Q D     2. Continue in counseling    3. Start exercise regimen     4. Call to report any worsening of symptoms or problems with the medication. Pt instructed to go to ER with thoughts of harming self, others    Psychotherapy:   Target symptoms:   Why chosen therapy is appropriate versus another modality: CBT used; relevant to diagnosis, patient responds to this modality  Outcome monitoring methods: self-report, observation  Therapeutic  intervention type: Cognitive Behavioral Therapy  Topics discussed/themes: building skills sets for symptom management, symptom recognition, nutrition, exercise  The patient's response to the intervention is good  Patient's response to treatment is: good.   The patient's progress toward treatment goals: increasing distress  16 minutes spent with pt in psychotherapy and 5 minutes in medication management     Return to clinic: 1 month    -Cognitive-Behavioral/Supportive therapy and psychoeducation provided  -R/B/SE's of medications discussed with the pt who expresses understanding and chooses to take medications as prescribed.   -Pt instructed to call clinic, 911 or go to nearest emergency room if sxs worsen or pt is in   crisis. The pt expresses understanding.    Saul Cronin, PhD, MP

## 2025-01-22 DIAGNOSIS — E11.8 DIABETES MELLITUS TYPE 2 WITH COMPLICATIONS: ICD-10-CM

## 2025-01-22 RX ORDER — SEMAGLUTIDE 2.68 MG/ML
INJECTION, SOLUTION SUBCUTANEOUS
Qty: 9 ML | Refills: 0 | Status: SHIPPED | OUTPATIENT
Start: 2025-01-22 | End: 2025-01-25 | Stop reason: SDUPTHER

## 2025-01-22 NOTE — TELEPHONE ENCOUNTER
Care Due:                  Date            Visit Type   Department     Provider  --------------------------------------------------------------------------------                                EP -                              PRIMARY      [facility]  Last Visit: 09-      CARE ([facility])   MEDICINE       Nikki Emery                              EP -                              PRIMARY      [facility]    Gina Barrios  Next Visit: 02-      CARE ([facility])   MEDICINE       Anger                                                            Last  Test          Frequency    Reason                     Performed    Due Date  --------------------------------------------------------------------------------    HBA1C.......  6 months...  OZEMPIC..................  06-   12-    [facility] Embedded Care Due Messages. Reference number: [id number].   1/22/2025 4:48:06 AM CST

## 2025-01-22 NOTE — TELEPHONE ENCOUNTER
Refill Routing Note   Medication(s) are not appropriate for processing by [facility] for the following reason(s):        Required labs outdated    [facility] action(s):  Defer     Requires labs : Yes             Appointments  past 12m or future 3m with PCP    Date Provider   Last Visit   6/10/2024 Gina Barrios MD   Next Visit   2/27/2025 Gina Barrios MD   ED visits in past 90 days: 0        Note composed:7:42 AM 01/22/2025

## 2025-01-25 ENCOUNTER — OFFICE VISIT (OUTPATIENT)
Dept: FAMILY MEDICINE | Facility: CLINIC | Age: 50
End: 2025-01-25
Payer: COMMERCIAL

## 2025-01-25 VITALS
BODY MASS INDEX: 28.21 KG/M2 | OXYGEN SATURATION: 98 % | RESPIRATION RATE: 16 BRPM | SYSTOLIC BLOOD PRESSURE: 116 MMHG | HEART RATE: 99 BPM | HEIGHT: 65 IN | WEIGHT: 169.31 LBS | TEMPERATURE: 97 F | DIASTOLIC BLOOD PRESSURE: 68 MMHG

## 2025-01-25 DIAGNOSIS — I15.2 HYPERTENSION ASSOCIATED WITH DIABETES: ICD-10-CM

## 2025-01-25 DIAGNOSIS — F32.1 CURRENT MODERATE EPISODE OF MAJOR DEPRESSIVE DISORDER, UNSPECIFIED WHETHER RECURRENT: ICD-10-CM

## 2025-01-25 DIAGNOSIS — E11.59 HYPERTENSION ASSOCIATED WITH DIABETES: ICD-10-CM

## 2025-01-25 DIAGNOSIS — R53.83 FATIGUE, UNSPECIFIED TYPE: ICD-10-CM

## 2025-01-25 DIAGNOSIS — K76.0 FATTY LIVER: ICD-10-CM

## 2025-01-25 DIAGNOSIS — E11.8 DIABETES MELLITUS TYPE 2 WITH COMPLICATIONS: Primary | ICD-10-CM

## 2025-01-25 DIAGNOSIS — M25.569 KNEE PAIN, UNSPECIFIED CHRONICITY, UNSPECIFIED LATERALITY: ICD-10-CM

## 2025-01-25 PROCEDURE — 1159F MED LIST DOCD IN RCRD: CPT | Mod: CPTII,S$GLB,, | Performed by: FAMILY MEDICINE

## 2025-01-25 PROCEDURE — 3008F BODY MASS INDEX DOCD: CPT | Mod: CPTII,S$GLB,, | Performed by: FAMILY MEDICINE

## 2025-01-25 PROCEDURE — 99214 OFFICE O/P EST MOD 30 MIN: CPT | Mod: S$GLB,,, | Performed by: FAMILY MEDICINE

## 2025-01-25 PROCEDURE — 3078F DIAST BP <80 MM HG: CPT | Mod: CPTII,S$GLB,, | Performed by: FAMILY MEDICINE

## 2025-01-25 PROCEDURE — 3074F SYST BP LT 130 MM HG: CPT | Mod: CPTII,S$GLB,, | Performed by: FAMILY MEDICINE

## 2025-01-25 PROCEDURE — 1160F RVW MEDS BY RX/DR IN RCRD: CPT | Mod: CPTII,S$GLB,, | Performed by: FAMILY MEDICINE

## 2025-01-25 PROCEDURE — G2211 COMPLEX E/M VISIT ADD ON: HCPCS | Mod: S$GLB,,, | Performed by: FAMILY MEDICINE

## 2025-01-25 RX ORDER — EPINEPHRINE 0.3 MG/.3ML
1 INJECTION SUBCUTANEOUS ONCE
Qty: 2 EACH | Refills: 0 | Status: SHIPPED | OUTPATIENT
Start: 2025-01-25 | End: 2025-01-25

## 2025-01-25 RX ORDER — SEMAGLUTIDE 2.68 MG/ML
2 INJECTION, SOLUTION SUBCUTANEOUS WEEKLY
Qty: 9 ML | Refills: 0 | Status: SHIPPED | OUTPATIENT
Start: 2025-01-25

## 2025-01-25 RX ORDER — SPIRONOLACTONE 50 MG/1
50 TABLET, FILM COATED ORAL DAILY
Qty: 90 TABLET | Refills: 1 | Status: SHIPPED | OUTPATIENT
Start: 2025-01-25

## 2025-01-25 NOTE — PROGRESS NOTES
Subjective:       Patient ID: Ana Dorantes is a 49 y.o. female.    Chief Complaint: Follow-up (6 month follow up )    Follow-up  Associated symptoms include fatigue. Pertinent negatives include no abdominal pain, chest pain, chills, congestion, coughing, diaphoresis, fever, nausea, rash, sore throat or vomiting.     The patient is a 49-year-old who is here today for six-month follow-up.  She is having a lot of stress dealing with her mom's dementia and is her full-time caregiver now that her son and daughter have moved out.  Her  does help her care for her mom when he is home.  She is mostly stuck at home and is not able to get out much.    Today we discussed the followin) diabetes.  She is currently taking Ozempic.  She does not check her sugars at home.  She is overdue for an A1c and would be willing to get that scheduled  2) hypertension.  Today her blood pressure is 116/68.  She is taking Aldactone which she is doing well with.  She is due for labs and would be willing to get that scheduled  3)  weight.  She is concerned about her weight.  She would like to lose an additional 15 lb.  She has not been able to exercise given her responsibilities with her mom and given her left knee pain.  She does have some room to optimize her diet and is going to work on that now that the holidays are over  4) depression.  Her psychiatrist recently increased her Lexapro but she has not started that dose yet.  She is having a lot of stress and depression particularly with her mom's dementia.    5) fatigue.  She has little energy.  She feels tired all the time.  She does have GIULIA and uses her CPAP consistently.  She realizes that her depression might be contributing to her fatigue.      Of note, she has recently been dealing with left knee pain    Review of Systems   Constitutional:  Positive for fatigue. Negative for appetite change, chills, diaphoresis, fever and unexpected weight change.   HENT:  Negative  for congestion, ear pain, postnasal drip, rhinorrhea, sinus pressure, sneezing, sore throat and trouble swallowing.    Eyes:  Negative for pain, discharge and visual disturbance.   Respiratory:  Negative for cough, chest tightness, shortness of breath and wheezing.    Cardiovascular:  Negative for chest pain, palpitations and leg swelling.   Gastrointestinal:  Negative for abdominal distention, abdominal pain, blood in stool, constipation, diarrhea, nausea and vomiting.   Skin:  Negative for rash.   Psychiatric/Behavioral:  Positive for dysphoric mood. Negative for self-injury, sleep disturbance and suicidal ideas. The patient is not nervous/anxious.          Objective:      Physical Exam  Constitutional:       General: She is not in acute distress.     Appearance: Normal appearance. She is well-developed.   HENT:      Head: Normocephalic and atraumatic.      Right Ear: Hearing, tympanic membrane, ear canal and external ear normal.      Left Ear: Hearing, tympanic membrane, ear canal and external ear normal.      Nose: Nose normal.      Mouth/Throat:      Mouth: No oral lesions.      Pharynx: No oropharyngeal exudate or posterior oropharyngeal erythema.   Eyes:      General: Lids are normal. No scleral icterus.     Extraocular Movements: Extraocular movements intact.      Conjunctiva/sclera: Conjunctivae normal.      Pupils: Pupils are equal, round, and reactive to light.   Neck:      Thyroid: No thyroid mass or thyromegaly.      Vascular: No carotid bruit.   Cardiovascular:      Rate and Rhythm: Normal rate and regular rhythm. No extrasystoles are present.     Chest Wall: PMI is not displaced.      Heart sounds: Normal heart sounds. No murmur heard.     No gallop.   Pulmonary:      Effort: Pulmonary effort is normal. No accessory muscle usage or respiratory distress.      Breath sounds: Normal breath sounds.   Abdominal:      General: Bowel sounds are normal. There is no abdominal bruit.      Palpations: Abdomen is  "soft.      Tenderness: There is no abdominal tenderness. There is no rebound.   Musculoskeletal:      Cervical back: Normal range of motion and neck supple.   Lymphadenopathy:      Head:      Right side of head: No submental or submandibular adenopathy.      Left side of head: No submental or submandibular adenopathy.      Cervical:      Right cervical: No superficial, deep or posterior cervical adenopathy.     Left cervical: No superficial, deep or posterior cervical adenopathy.      Upper Body:      Right upper body: No supraclavicular adenopathy.      Left upper body: No supraclavicular adenopathy.   Skin:     General: Skin is warm and dry.   Neurological:      Mental Status: She is alert and oriented to person, place, and time.      Cranial Nerves: No cranial nerve deficit.      Sensory: No sensory deficit.   Psychiatric:         Speech: Speech normal.         Behavior: Behavior normal.         Thought Content: Thought content normal.       Blood pressure 116/68, pulse 99, temperature 97.3 °F (36.3 °C), temperature source Temporal, resp. rate 16, height 5' 5" (1.651 m), weight 76.8 kg (169 lb 5 oz), last menstrual period 01/24/2025, SpO2 98%.Body mass index is 28.18 kg/m².          A/P:  1) diabetes.  Status unknown.  We will check an A1c soon.  Continue with Ozempic   2)  Hypertension.  Well controlled.  Continue spironolactone.  We will check a CMP soon   3)  Overweight with a BMI of 28.18.  She is going to work on trying to get to the gym and optimizing her diet.  She will continue with the Ozempic   4)   Depression.  Suboptimally controlled.  She is going to start the higher dose of Lexapro and follow up with mental health provider.  She is going to consider a sitter for her mom for additional help at home   5)  Fatigue.  Likely multifactorial.  We will check labs soon  6)  GIULIA.  Continue with CPAP   7)  Left knee pain.  New.  We will refer her to the orthopedist for further evaluation.    8)  fatty liver " disease.  We will check liver tests soon

## 2025-01-28 ENCOUNTER — LAB VISIT (OUTPATIENT)
Dept: LAB | Facility: HOSPITAL | Age: 50
End: 2025-01-28
Attending: FAMILY MEDICINE
Payer: COMMERCIAL

## 2025-01-28 DIAGNOSIS — R53.83 FATIGUE, UNSPECIFIED TYPE: ICD-10-CM

## 2025-01-28 DIAGNOSIS — M25.562 LEFT KNEE PAIN: Primary | ICD-10-CM

## 2025-01-28 DIAGNOSIS — E11.8 DIABETES MELLITUS TYPE 2 WITH COMPLICATIONS: ICD-10-CM

## 2025-01-28 LAB
ALBUMIN SERPL BCP-MCNC: 3.5 G/DL (ref 3.5–5.2)
ALP SERPL-CCNC: 63 U/L (ref 40–150)
ALT SERPL W/O P-5'-P-CCNC: 23 U/L (ref 10–44)
ANION GAP SERPL CALC-SCNC: 8 MMOL/L (ref 8–16)
AST SERPL-CCNC: 24 U/L (ref 10–40)
BILIRUB SERPL-MCNC: 0.6 MG/DL (ref 0.1–1)
BUN SERPL-MCNC: 12 MG/DL (ref 6–20)
CALCIUM SERPL-MCNC: 8.9 MG/DL (ref 8.7–10.5)
CHLORIDE SERPL-SCNC: 101 MMOL/L (ref 95–110)
CO2 SERPL-SCNC: 29 MMOL/L (ref 23–29)
CREAT SERPL-MCNC: 0.8 MG/DL (ref 0.5–1.4)
ERYTHROCYTE [DISTWIDTH] IN BLOOD BY AUTOMATED COUNT: 13.5 % (ref 11.5–14.5)
EST. GFR  (NO RACE VARIABLE): >60 ML/MIN/1.73 M^2
ESTIMATED AVG GLUCOSE: 108 MG/DL (ref 68–131)
GLUCOSE SERPL-MCNC: 113 MG/DL (ref 70–110)
HBA1C MFR BLD: 5.4 % (ref 4–5.6)
HCT VFR BLD AUTO: 40.5 % (ref 37–48.5)
HGB BLD-MCNC: 13.1 G/DL (ref 12–16)
MCH RBC QN AUTO: 30.8 PG (ref 27–31)
MCHC RBC AUTO-ENTMCNC: 32.3 G/DL (ref 32–36)
MCV RBC AUTO: 95 FL (ref 82–98)
PLATELET # BLD AUTO: 291 K/UL (ref 150–450)
PMV BLD AUTO: 10.4 FL (ref 9.2–12.9)
POTASSIUM SERPL-SCNC: 4.5 MMOL/L (ref 3.5–5.1)
PROT SERPL-MCNC: 7.9 G/DL (ref 6–8.4)
RBC # BLD AUTO: 4.25 M/UL (ref 4–5.4)
SODIUM SERPL-SCNC: 138 MMOL/L (ref 136–145)
TSH SERPL DL<=0.005 MIU/L-ACNC: 3.67 UIU/ML (ref 0.4–4)
WBC # BLD AUTO: 7.18 K/UL (ref 3.9–12.7)

## 2025-01-28 PROCEDURE — 85027 COMPLETE CBC AUTOMATED: CPT | Performed by: FAMILY MEDICINE

## 2025-01-28 PROCEDURE — 80053 COMPREHEN METABOLIC PANEL: CPT | Performed by: FAMILY MEDICINE

## 2025-01-28 PROCEDURE — 84443 ASSAY THYROID STIM HORMONE: CPT | Performed by: FAMILY MEDICINE

## 2025-01-28 PROCEDURE — 83036 HEMOGLOBIN GLYCOSYLATED A1C: CPT | Performed by: FAMILY MEDICINE

## 2025-01-29 ENCOUNTER — OFFICE VISIT (OUTPATIENT)
Dept: ORTHOPEDICS | Facility: CLINIC | Age: 50
End: 2025-01-29
Payer: COMMERCIAL

## 2025-01-29 ENCOUNTER — HOSPITAL ENCOUNTER (OUTPATIENT)
Dept: RADIOLOGY | Facility: HOSPITAL | Age: 50
Discharge: HOME | End: 2025-01-29
Attending: ORTHOPAEDIC SURGERY
Payer: COMMERCIAL

## 2025-01-29 VITALS — WEIGHT: 169.31 LBS | HEIGHT: 65 IN | BODY MASS INDEX: 28.21 KG/M2

## 2025-01-29 DIAGNOSIS — M25.562 LEFT KNEE PAIN: ICD-10-CM

## 2025-01-29 DIAGNOSIS — M25.569 KNEE PAIN, UNSPECIFIED CHRONICITY, UNSPECIFIED LATERALITY: ICD-10-CM

## 2025-01-29 PROCEDURE — 73560 X-RAY EXAM OF KNEE 1 OR 2: CPT | Mod: TC,PO,RT

## 2025-01-29 PROCEDURE — 99999 PR PBB SHADOW E&M-EST. PATIENT-LVL III: CPT | Mod: PBBFAC,,, | Performed by: ORTHOPAEDIC SURGERY

## 2025-01-29 PROCEDURE — 1159F MED LIST DOCD IN RCRD: CPT | Mod: CPTII,S$GLB,, | Performed by: ORTHOPAEDIC SURGERY

## 2025-01-29 PROCEDURE — 3008F BODY MASS INDEX DOCD: CPT | Mod: CPTII,S$GLB,, | Performed by: ORTHOPAEDIC SURGERY

## 2025-01-29 PROCEDURE — 3044F HG A1C LEVEL LT 7.0%: CPT | Mod: CPTII,S$GLB,, | Performed by: ORTHOPAEDIC SURGERY

## 2025-01-29 PROCEDURE — 73562 X-RAY EXAM OF KNEE 3: CPT | Mod: 26,LT,, | Performed by: RADIOLOGY

## 2025-01-29 PROCEDURE — 73560 X-RAY EXAM OF KNEE 1 OR 2: CPT | Mod: 26,59,RT, | Performed by: RADIOLOGY

## 2025-01-29 PROCEDURE — 99203 OFFICE O/P NEW LOW 30 MIN: CPT | Mod: S$GLB,,, | Performed by: ORTHOPAEDIC SURGERY

## 2025-01-29 NOTE — PROGRESS NOTES
49 years old left knee pain been present now for couple months time started after a fall onto the anterior aspect of the knee couple months ago aching pain 3 on good days 7 on bad days difficulty going up and down stairs walking is painful     Exam shows no signs infection good motion of the strength no swelling skin is intact positive patellar crunch     X-rays are negative     Assessment:  Patellofemoral pain exacerbated by fall direct blow onto the anterior aspect of the knee     Plan:  Home exercise program, we did discuss with the possibility of Kenalog injections for temporary relief, follow-up as needed

## 2025-02-12 ENCOUNTER — OFFICE VISIT (OUTPATIENT)
Dept: PSYCHIATRY | Facility: CLINIC | Age: 50
End: 2025-02-12
Payer: COMMERCIAL

## 2025-02-12 VITALS
SYSTOLIC BLOOD PRESSURE: 122 MMHG | HEART RATE: 78 BPM | BODY MASS INDEX: 29.9 KG/M2 | WEIGHT: 179.44 LBS | HEIGHT: 65 IN | DIASTOLIC BLOOD PRESSURE: 86 MMHG

## 2025-02-12 DIAGNOSIS — F33.1 MODERATE EPISODE OF RECURRENT MAJOR DEPRESSIVE DISORDER: Primary | ICD-10-CM

## 2025-02-12 DIAGNOSIS — F90.0 ATTENTION DEFICIT HYPERACTIVITY DISORDER (ADHD), PREDOMINANTLY INATTENTIVE TYPE: ICD-10-CM

## 2025-02-12 PROCEDURE — 3008F BODY MASS INDEX DOCD: CPT | Mod: CPTII,S$GLB,, | Performed by: PSYCHOLOGIST

## 2025-02-12 PROCEDURE — 99214 OFFICE O/P EST MOD 30 MIN: CPT | Mod: S$GLB,,, | Performed by: PSYCHOLOGIST

## 2025-02-12 PROCEDURE — 1159F MED LIST DOCD IN RCRD: CPT | Mod: CPTII,S$GLB,, | Performed by: PSYCHOLOGIST

## 2025-02-12 PROCEDURE — 3044F HG A1C LEVEL LT 7.0%: CPT | Mod: CPTII,S$GLB,, | Performed by: PSYCHOLOGIST

## 2025-02-12 PROCEDURE — 99999 PR PBB SHADOW E&M-EST. PATIENT-LVL III: CPT | Mod: PBBFAC,,, | Performed by: PSYCHOLOGIST

## 2025-02-12 PROCEDURE — 3079F DIAST BP 80-89 MM HG: CPT | Mod: CPTII,S$GLB,, | Performed by: PSYCHOLOGIST

## 2025-02-12 PROCEDURE — 90833 PSYTX W PT W E/M 30 MIN: CPT | Mod: S$GLB,,, | Performed by: PSYCHOLOGIST

## 2025-02-12 PROCEDURE — 3074F SYST BP LT 130 MM HG: CPT | Mod: CPTII,S$GLB,, | Performed by: PSYCHOLOGIST

## 2025-02-12 RX ORDER — DEXMETHYLPHENIDATE HYDROCHLORIDE 5 MG/1
5 CAPSULE, EXTENDED RELEASE ORAL DAILY
Qty: 30 CAPSULE | Refills: 0 | Status: SHIPPED | OUTPATIENT
Start: 2025-02-12

## 2025-02-12 RX ORDER — ESCITALOPRAM OXALATE 20 MG/1
20 TABLET ORAL DAILY
Qty: 30 TABLET | Refills: 1 | Status: SHIPPED | OUTPATIENT
Start: 2025-02-12 | End: 2026-02-12

## 2025-02-12 NOTE — PROGRESS NOTES
Outpatient Psychiatry Follow-Up Visit    Clinical Status of Patient: Outpatient (Ambulatory)  02/12/2025     Chief Complaint: 49 year old female presenting today for a follow-up.       Interval History and Content of Current Session:  Interim Events/Subjective Report/Content of Current Session:  follow-up appointment.    Pt is a 49 year old female with past psychiatric hx of depression, anxiety, and ADHD who presents for follow-up treatment. Pt is feeling less depressed. Pt's continued concern is her memory and lack of focus. Pt was on Focalin in the past. We discussed the potential risks and benefits of a low dose of stimulant. Pt would like to try. Pt denied any other major changes or new stressors.    Past Psychiatric hx: Focalin    Past Medical hx:   Past Medical History:   Diagnosis Date    YAIR positive     Diabetes     Fatty liver     GERD (gastroesophageal reflux disease)     Hypertension     Sleep apnea     uses CPAP        Interim hx:  Medication changes last visit:   increased Lexapro dosage to 20mg Q D  Anxiety: mild - unchanged  Depression: mild - improved     Denies suicidal/homicidal ideations.  Denies hopelessness/worthlessness.    Denies auditory/visual hallucinations      Alcohol: minimal, infrequent  Drug: occasional THC cartridge use  Caffeine: minimal use  Tobacco: pt denied      Review of Systems   PSYCHIATRIC: Pertinent items are noted in the narrative.        CONSTITUTIONAL: weight stable    Past Medical, Family and Social History: The patient's past medical, family and social history have been reviewed and updated as appropriate within the electronic medical record. See encounter notes.     Current Psychiatric Medication:  Lexapro 20mg once daily     Compliance: yes      Side effects: low libido     Risk Parameters:  Patient reports no suicidal ideation  Patient reports no homicidal ideation  Patient reports no self-injurious behavior  Patient reports no violent behavior     Exam (detailed:  at least 9 elements; comprehensive: all 15 elements)   Constitutional  Vitals:  Most recent vital signs, dated less than 90 days prior to this appointment, were reviewed. Pulse:  [78]   BP: (122)/(86)       General:  unremarkable, age appropriate, casual attire, good eye contact, good rapport       Musculoskeletal  Muscle Strength/Tone:  no flaccidity, no tremor    Gait & Station:  normal      Psychiatric                       Speech:  normal tone, normal rate, rhythm, and volume   Mood & Affect:   Depressed, anxious         Thought Process:   Goal directed; Linear    Associations:   intact   Thought Content:   No SI/HI, delusions, or paranoia, no AV/VH   Insight & Judgement:   Good, adequate to circumstances   Orientation:   grossly intact; alert and oriented x 4    Memory:  intact for content of interview    Language:  grossly intact, can repeat    Attention Span  : Grossly intact for content of interview   Fund of Knowledge:   intact and appropriate to age and level of education        Assessment and Diagnosis   Status/Progress: increasing distress     Impression: Pt appears to struggle with recurrent depression and secondary anxiety. Pt has also been evaluated and treated for ADHD in the past. Pt should continue in therapy, continue in psychiatric medication management, and increase her cardio exercise regimen.     Diagnosis(es):   1) Major Depressive Disorder, recurrent, moderate  2) ADHD, predominately inattentive type    Intervention/Counseling/Treatment Plan   Medication Management:      Continue Lexapro 20mg Q D    2. Start Focalin XR 5mg Q AM     3. Continue in counseling    4. Start exercise regimen     5. Call to report any worsening of symptoms or problems with the medication. Pt instructed to go to ER with thoughts of harming self, others    Psychotherapy:   Target symptoms: attention/concentration, depression  Why chosen therapy is appropriate versus another modality: CBT used; relevant to diagnosis,  patient responds to this modality  Outcome monitoring methods: self-report, observation  Therapeutic intervention type: Cognitive Behavioral Therapy  Topics discussed/themes: building skills sets for symptom management, symptom recognition, nutrition, exercise  The patient's response to the intervention is good  Patient's response to treatment is: good.   The patient's progress toward treatment goals: improving  16 minutes spent with pt in psychotherapy and 5 minutes in medication management     Return to clinic: 1 month    -Cognitive-Behavioral/Supportive therapy and psychoeducation provided  -R/B/SE's of medications discussed with the pt who expresses understanding and chooses to take medications as prescribed.   -Pt instructed to call clinic, 911 or go to nearest emergency room if sxs worsen or pt is in   crisis. The pt expresses understanding.    Saul Cronin, PhD, MP     Stimulant Medication Initiation:  Patient advised of risks, benefits, and side effects of medication and accepts informed consent.  Common side effects include insomnia, irritability, jittery feeling, dry mouth, and agitation/hostility., Patient advised of potential addictive nature of medication and controlled substance classification.  Instructed to safeguard medication as no early refills can be given for lost or stolen medications.

## 2025-02-17 NOTE — TELEPHONE ENCOUNTER
I spoke with Ms. Murray and let her know a script was sent on 4/30/18. In med card stated copy confirmed.  I have printed the order/copy from med card and faxed the it to the pharmacy. Ms. Murray will check again with the pharmacy later today.   3

## 2025-03-10 DIAGNOSIS — F90.0 ADHD (ATTENTION DEFICIT HYPERACTIVITY DISORDER), INATTENTIVE TYPE: Primary | ICD-10-CM

## 2025-03-10 RX ORDER — METHYLPHENIDATE HYDROCHLORIDE 27 MG/1
27 TABLET ORAL EVERY MORNING
Qty: 30 TABLET | Refills: 0 | Status: SHIPPED | OUTPATIENT
Start: 2025-03-10

## 2025-03-18 ENCOUNTER — PATIENT MESSAGE (OUTPATIENT)
Dept: PSYCHIATRY | Facility: CLINIC | Age: 50
End: 2025-03-18
Payer: COMMERCIAL

## 2025-03-19 ENCOUNTER — TELEPHONE (OUTPATIENT)
Dept: PSYCHIATRY | Facility: CLINIC | Age: 50
End: 2025-03-19
Payer: COMMERCIAL

## 2025-03-19 NOTE — TELEPHONE ENCOUNTER
PT called 12:59 PM    She was returning a prior phone call and wanted to speak to the nurse who called her.

## 2025-03-27 ENCOUNTER — OFFICE VISIT (OUTPATIENT)
Dept: PSYCHIATRY | Facility: CLINIC | Age: 50
End: 2025-03-27
Payer: COMMERCIAL

## 2025-03-27 VITALS
SYSTOLIC BLOOD PRESSURE: 121 MMHG | WEIGHT: 170.94 LBS | HEIGHT: 65 IN | DIASTOLIC BLOOD PRESSURE: 88 MMHG | HEART RATE: 92 BPM | BODY MASS INDEX: 28.48 KG/M2

## 2025-03-27 DIAGNOSIS — F33.1 MODERATE EPISODE OF RECURRENT MAJOR DEPRESSIVE DISORDER: ICD-10-CM

## 2025-03-27 DIAGNOSIS — F90.0 ATTENTION DEFICIT HYPERACTIVITY DISORDER (ADHD), PREDOMINANTLY INATTENTIVE TYPE: Primary | ICD-10-CM

## 2025-03-27 PROCEDURE — 99999 PR PBB SHADOW E&M-EST. PATIENT-LVL III: CPT | Mod: PBBFAC,,, | Performed by: PSYCHOLOGIST

## 2025-03-27 PROCEDURE — 96130 PSYCL TST EVAL PHYS/QHP 1ST: CPT | Mod: S$GLB,,, | Performed by: PSYCHOLOGIST

## 2025-03-27 PROCEDURE — 96139 PSYCL/NRPSYC TST TECH EA: CPT | Mod: S$GLB,,, | Performed by: PSYCHOLOGIST

## 2025-03-27 PROCEDURE — 96138 PSYCL/NRPSYC TECH 1ST: CPT | Mod: S$GLB,,, | Performed by: PSYCHOLOGIST

## 2025-03-27 PROCEDURE — 96131 PSYCL TST EVAL PHYS/QHP EA: CPT | Mod: S$GLB,,, | Performed by: PSYCHOLOGIST

## 2025-03-27 PROCEDURE — 99499 UNLISTED E&M SERVICE: CPT | Mod: S$GLB,,, | Performed by: PSYCHOLOGIST

## 2025-03-27 RX ORDER — METHYLPHENIDATE HYDROCHLORIDE 36 MG/1
36 TABLET ORAL EVERY MORNING
Qty: 30 TABLET | Refills: 0 | Status: SHIPPED | OUTPATIENT
Start: 2025-03-27

## 2025-03-27 NOTE — PROGRESS NOTES
Outpatient Psychological Consultation    Name: Ana Dorantes  MRN: [id number]  : 1975    Testing appointment: 2025    ID:  Patient presents for consultation for diagnostic clarity in regard to difficulties with attention/concentration    Reason for encounter Referral from this author    Psychiatric records were reviewed prior to the diagnostic interview. Comprehensive psychological assessment will not be documented in this report rather will be focused on the referral question. See prior notes for comprehensive psychiatric work-up.    Chief Complaint: Pt is a 49 year old female presenting as a referral from this author for diagnostic clarification due to report of difficulty concentration/poor attention    Psychological Assessments Administered  Wender [place] Rating Scale for the Attention Deficit Hyperactivity Disorder  Nini Adult ADHD Rating Scales-IV: Other-Report, current symptoms  Nini Adult ADHD Rating Scales-IV: Other-Report, childhood symptoms  Desmond Continuous Performance Test 3  The Dot Counting Test    Results    Wender [place] Rating Scale for the Attention Deficit Hyperactivity Disorder  The Wender [place] Rating Scale can be used to assess adults for Attention Deficit Hyperactivity Disorder with a subset of 25 questions associated with that diagnosis. It is a retrospective diagnosis of childhood ADHD.      Wender [place] Rating Scale Subscore = 66 (sum of the 25 questions endorsed that are associated with ADHD)    Scores vary from 1-100, and the cutoff score is 46.    Given pt's report of their own symptoms of ADHD in childhood, their response style does support a diagnosis of ADHD.    Nini Adult ADHD Rating Scales-IV: Other-Report, current symptoms  The BAARS-IV: Other-Report, current symptoms is a collateral measure of the patient's current symptoms of ADHD, as noted by someone with knowledge of the patient's executive functioning.    Zaid Dorantes is the evaluator whose relationship  to pt is her adult son.     Inattention total score: 29      Hyperactivity total score: 11      Impulsivity total score: 7       Sluggish Cognitive Tempo total score: 27    ADHD total score (sum of Inattention/Hyperactivity/Impulsivity Subsections): 47       Inattention Symptom Count: 7     Hyperactivity/Impulsivity Symptom Count: 3   ADHD Symptom Count total (sum of Inattention/Hyperactivity/Impulsivity Subsections): 10     Based on the evaluator's report of pt's symptoms, pt does often struggle with 7 symptoms of inattention but only 3 symptoms of hyperactivity/impulsivity. This supports a diagnosis of ADHD - predominately inattentive type    Nini Adult ADHD Rating Scales-IV: Other-Report, childhood symptoms  The BAARS-IV: Other-Report, childhood symptoms is a collateral measure of the patient's symptoms of ADHD between the ages of 5-12 years old, as reported by someone with knowledge of the patient's symptoms during childhood.    Zaid Dorantes is the evaluator whose relationship to pt is her spouse.      Inattention total score: 32   Hyperactivity/Impulsivity total score: 18     Inattention Symptom Count: 8   Hyperactivity/Impulsivity Symptom Count: 2     Based on the evaluator's report of symptoms, pt did exhibit sufficient symptoms of inattention, hyperactivity, and impulsivity that affected multiple settings at an early age to meet the DSM-5 diagnosis criteria for an ADHD diagnosis.     Including this assessment here but it was her spouse that filled this out and so it not an accurate reflection of childhood symptoms.    Desmond Continuous Performance Test 3  The Desmond Continuous Performance Test 3rd Edition (Desmond CPT 3) is an objective, task-oriented computerized assessment of attention-related problems. This measure creates an index of the respondent's performance in areas of inattentiveness, impulsivity, sustained attention, and vigilance.    Pt's performance on this objective measure does  indicate difficulties with sustained attention, hyperactivity, impulsivity, and difficulty maintaining vigilance with varying levels of stimulus frequency.    The Dot Counting Test  The Dot Counting Test (DCT) is a brief task that assesses test-taking effort in individuals.     Based on patient performance and score, pt appeared to demonstrate good effort.    Interpretation    Pt is a 49 year old female presenting as a referral from this author for diagnostic clarification due to report of difficulty concentration/poor attention. Pt reports attention/concentration concerns consistent with ADHD, predominantly inattentive type. Pt explained that her symptoms were present before the age of 11 and cause impairment in more than one setting.    Diagnosis     Attention-Deficit / Hyperactivity Disorder, predominately inattentive    Plan and Recommendations    Pt will start Concerta 36mg Q AM. Pt will continue Lexapro 20mg once daily.    Attention Tips Remember that inattention and lack of focus are major culprits to forgetting information so be sure and practice paying attention for adequate learning of information. If you rely on passive attention to remembering something (e.g., yeah, uh-huh approach), you'll find you cannot recall it later. I recommend the following to improve attention, which may aid in later recall:   Reduce distractions as much as possible.  Look at the person as they are speaking to you.   Paraphrase as they are speaking  Write down important pieces of information   Ask people to repeat if you zone out.    Have visual cues  (posted to-do-list, daily schedule) to remind you if you need to do something later.   Processing Speed Tips Using multiple modalities (e.g., listening, writing notes, asking questions, recording) to learn new information is likely to allow additional time for processing, thus improving memory for the material.   Allowing sufficient time to complete tasks will reduce  frustration and help to ensure completion.  Spend a lot of up-front time planning in advance how long a task may take and then chunk steps in the task so you don't wear yourself out.   Executive Functioning Tips: Don't attempt to multi-task.  Separate tasks so that each can be completed one at a time.  Consider using a calendar/day planner, as that may be effective to help you plan and stay on track.  Color-coding specific tasks by importance may add additional benefit to your planner.  Break down large projects into smaller tasks and write down the steps to completing the task.       BILLIN, 96139 X2 (90 minutes of testing and scoring with psychometrist), [id number], 91930 (2 hours of report writing, evaluation and feedback)      Stimulant Medication Initiation:  Patient advised of risks, benefits, and side effects of medication and accepts informed consent.  Common side effects include insomnia, irritability, jittery feeling, dry mouth, and agitation/hostility., Patient advised of potential addictive nature of medication and controlled substance classification.  Instructed to safeguard medication as no early refills can be given for lost or stolen medications.

## 2025-03-31 DIAGNOSIS — E11.8 DIABETES MELLITUS TYPE 2 WITH COMPLICATIONS: ICD-10-CM

## 2025-03-31 NOTE — TELEPHONE ENCOUNTER
No care due was identified.  [facility] Embedded Care Due Messages. Reference number: [id number].   3/31/2025 5:35:37 PM CDT

## 2025-04-08 RX ORDER — SEMAGLUTIDE 2.68 MG/ML
2 INJECTION, SOLUTION SUBCUTANEOUS WEEKLY
Qty: 9 ML | Refills: 0 | Status: SHIPPED | OUTPATIENT
Start: 2025-04-08

## 2025-04-11 ENCOUNTER — PATIENT MESSAGE (OUTPATIENT)
Dept: PSYCHIATRY | Facility: CLINIC | Age: 50
End: 2025-04-11
Payer: COMMERCIAL

## 2025-04-14 ENCOUNTER — OFFICE VISIT (OUTPATIENT)
Dept: FAMILY MEDICINE | Facility: CLINIC | Age: 50
End: 2025-04-14
Payer: COMMERCIAL

## 2025-04-14 VITALS
BODY MASS INDEX: 29.05 KG/M2 | DIASTOLIC BLOOD PRESSURE: 88 MMHG | WEIGHT: 174.38 LBS | SYSTOLIC BLOOD PRESSURE: 130 MMHG | RESPIRATION RATE: 18 BRPM | OXYGEN SATURATION: 97 % | HEART RATE: 80 BPM | HEIGHT: 65 IN | TEMPERATURE: 98 F

## 2025-04-14 DIAGNOSIS — E11.8 DIABETES MELLITUS TYPE 2 WITH COMPLICATIONS: ICD-10-CM

## 2025-04-14 DIAGNOSIS — Z12.39 ENCOUNTER FOR SCREENING FOR MALIGNANT NEOPLASM OF BREAST, UNSPECIFIED SCREENING MODALITY: ICD-10-CM

## 2025-04-14 DIAGNOSIS — M79.89 SOFT TISSUE MASS: Primary | ICD-10-CM

## 2025-04-14 DIAGNOSIS — E66.3 OVERWEIGHT (BMI 25.0-29.9): ICD-10-CM

## 2025-04-14 PROCEDURE — 3044F HG A1C LEVEL LT 7.0%: CPT | Mod: CPTII,S$GLB,, | Performed by: FAMILY MEDICINE

## 2025-04-14 PROCEDURE — 99214 OFFICE O/P EST MOD 30 MIN: CPT | Mod: S$GLB,,, | Performed by: FAMILY MEDICINE

## 2025-04-14 PROCEDURE — 3079F DIAST BP 80-89 MM HG: CPT | Mod: CPTII,S$GLB,, | Performed by: FAMILY MEDICINE

## 2025-04-14 PROCEDURE — 3075F SYST BP GE 130 - 139MM HG: CPT | Mod: CPTII,S$GLB,, | Performed by: FAMILY MEDICINE

## 2025-04-14 PROCEDURE — 1160F RVW MEDS BY RX/DR IN RCRD: CPT | Mod: CPTII,S$GLB,, | Performed by: FAMILY MEDICINE

## 2025-04-14 PROCEDURE — 3008F BODY MASS INDEX DOCD: CPT | Mod: CPTII,S$GLB,, | Performed by: FAMILY MEDICINE

## 2025-04-14 PROCEDURE — 1159F MED LIST DOCD IN RCRD: CPT | Mod: CPTII,S$GLB,, | Performed by: FAMILY MEDICINE

## 2025-04-14 PROCEDURE — G2211 COMPLEX E/M VISIT ADD ON: HCPCS | Mod: S$GLB,,, | Performed by: FAMILY MEDICINE

## 2025-04-14 RX ORDER — TIRZEPATIDE 5 MG/.5ML
5 INJECTION, SOLUTION SUBCUTANEOUS
Qty: 2 ML | Refills: 0 | Status: SHIPPED | OUTPATIENT
Start: 2025-05-15 | End: 2025-04-21

## 2025-04-14 RX ORDER — MELOXICAM 15 MG/1
15 TABLET ORAL DAILY
Qty: 30 TABLET | Refills: 0 | Status: SHIPPED | OUTPATIENT
Start: 2025-04-14

## 2025-04-15 ENCOUNTER — TELEPHONE (OUTPATIENT)
Dept: PSYCHIATRY | Facility: CLINIC | Age: 50
End: 2025-04-15
Payer: COMMERCIAL

## 2025-04-15 ENCOUNTER — HOSPITAL ENCOUNTER (OUTPATIENT)
Dept: RADIOLOGY | Facility: HOSPITAL | Age: 50
Discharge: HOME | End: 2025-04-15
Attending: FAMILY MEDICINE
Payer: COMMERCIAL

## 2025-04-15 DIAGNOSIS — M79.89 SOFT TISSUE MASS: ICD-10-CM

## 2025-04-15 PROCEDURE — 76882 US LMTD JT/FCL EVL NVASC XTR: CPT | Mod: TC,PO,RT

## 2025-04-15 PROCEDURE — 76882 US LMTD JT/FCL EVL NVASC XTR: CPT | Mod: 26,RT,, | Performed by: RADIOLOGY

## 2025-04-15 NOTE — TELEPHONE ENCOUNTER
For documentation purposes    Prior authorization for methylphenidate 36 mg approved from 04/15/2025 through 12/31/2039. Pharmacy and patient notified.  Maia

## 2025-04-16 ENCOUNTER — PATIENT MESSAGE (OUTPATIENT)
Dept: FAMILY MEDICINE | Facility: CLINIC | Age: 50
End: 2025-04-16
Payer: COMMERCIAL

## 2025-04-16 DIAGNOSIS — R22.40 LOCALIZED SWELLING, MASS, OR LUMP OF LOWER EXTREMITY, UNSPECIFIED LATERALITY: Primary | ICD-10-CM

## 2025-04-20 NOTE — PROGRESS NOTES
Subjective:       Patient ID: Ana Dorantes is a 49 y.o. female.    Chief Complaint: Leg Pain (RIGHT THIGH PAIN )    History of Present Illness    CHIEF COMPLAINT:  Patient presents today for thigh pain and weight loss plateau    MUSCULOSKELETAL:  She reports outer thigh pain for past 5 weeks, with discovery of lumps upon self-exam. Pain is exacerbated when walking up and down stairs.  Of note, she has been taking Aleve for sciatica that began one week ago.    WEIGHT MANAGEMENT:  She reports difficulty losing the last 15-20 lbs despite significant effort, describing being at a plateau with weight loss. She continues Ozempic 2 mg once a week for weight management.  She wonders about other alternatives    DIABETES:  She reports irregular blood sugar monitoring and dietary non-compliance, particularly during recent festivals.    MENTAL HEALTH:  She is working with a mental health provider.  She recently started medication for depression (Lexapro). She has been diagnosed with ADHD and is awaiting medication initiation, reporting difficulty with focus and concentration.      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 She provides care 24/7 and reports experiencing physical and mental strain from these responsibilities.         Review of Systems   Constitutional:  Positive for activity change. Negative for appetite change, chills, diaphoresis, fatigue, fever and unexpected weight change.   HENT:  Negative for congestion, ear pain, hearing loss, postnasal drip, rhinorrhea, sinus pressure, sneezing, sore throat and trouble swallowing.    Eyes:  Negative for pain, discharge and visual disturbance.   Respiratory:  Negative for cough, chest tightness, shortness of breath and wheezing.    Cardiovascular:  Negative for chest pain, palpitations and leg swelling.   Gastrointestinal:  Negative for abdominal distention, abdominal pain, blood in stool, constipation,  diarrhea, nausea and vomiting.   Endocrine: Negative for polydipsia and polyuria.   Genitourinary:  Negative for difficulty urinating, dysuria, hematuria and menstrual problem.   Musculoskeletal:  Positive for arthralgias and joint swelling. Negative for neck pain.   Skin:  Negative for rash.   Neurological:  Negative for weakness and headaches.   Psychiatric/Behavioral:  Positive for decreased concentration and dysphoric mood. Negative for confusion, self-injury, sleep disturbance and suicidal ideas. The patient is not nervous/anxious.          Objective:      Physical Exam  Constitutional:       General: She is not in acute distress.     Appearance: Normal appearance. She is well-developed.   HENT:      Head: Normocephalic and atraumatic.      Right Ear: Hearing, tympanic membrane, ear canal and external ear normal.      Left Ear: Hearing, tympanic membrane, ear canal and external ear normal.      Nose: Nose normal.      Mouth/Throat:      Mouth: No oral lesions.      Pharynx: No oropharyngeal exudate or posterior oropharyngeal erythema.   Eyes:      General: Lids are normal. No scleral icterus.     Extraocular Movements: Extraocular movements intact.      Conjunctiva/sclera: Conjunctivae normal.      Pupils: Pupils are equal, round, and reactive to light.   Neck:      Thyroid: No thyroid mass or thyromegaly.      Vascular: No carotid bruit.   Cardiovascular:      Rate and Rhythm: Normal rate and regular rhythm. No extrasystoles are present.     Chest Wall: PMI is not displaced.      Heart sounds: Normal heart sounds. No murmur heard.     No gallop.   Pulmonary:      Effort: Pulmonary effort is normal. No accessory muscle usage or respiratory distress.      Breath sounds: Normal breath sounds.   Abdominal:      General: Bowel sounds are normal. There is no abdominal bruit.      Palpations: Abdomen is soft.      Tenderness: There is no abdominal tenderness. There is no rebound.   Musculoskeletal:      Cervical  "back: Normal range of motion and neck supple.      Right hip: Tenderness (Over the lateral thigh) present. No deformity or bony tenderness. Normal range of motion.      Comments: She does have 2 soft tissue masses present in the lateral thigh which are tender to palpation   Lymphadenopathy:      Head:      Right side of head: No submental or submandibular adenopathy.      Left side of head: No submental or submandibular adenopathy.      Cervical:      Right cervical: No superficial, deep or posterior cervical adenopathy.     Left cervical: No superficial, deep or posterior cervical adenopathy.      Upper Body:      Right upper body: No supraclavicular adenopathy.      Left upper body: No supraclavicular adenopathy.   Skin:     General: Skin is warm and dry.   Neurological:      Mental Status: She is alert and oriented to person, place, and time.       Blood pressure 130/88, pulse 80, temperature 97.7 °F (36.5 °C), resp. rate 18, height 5' 5" (1.651 m), weight 79.1 kg (174 lb 6.1 oz), last menstrual period 04/09/2025, SpO2 97%.Body mass index is 29.02 kg/m².            Assessment & Plan    1. Considered switching to Mounjaro given weight loss plateau   2. Suspect soft tissue mass on outer thigh could be lipoma, but ordered US to confirm.  3. Considered bursitis or tendonitis as potential causes for thigh pain.  4. Recommend focus on weight maintenance given current life stressors.    TYPE 2 DIABETES MELLITUS:  - Initiated Mounjaro 5 mg, with plans to increase dose monthly.  - Scheduled A1c test on the same day as July appointment.  - Observed that A1c levels have been good.  - Planned to switch medication from Ozempic to Mounjaro (tirzepatide) for better weight loss and diabetes management.  - Instructed the patient to start at 5mg dose with monthly escalation.    RIGHT THIGH PAIN:  - Prescribed meloxicam (Mobic) for 10 days as anti-inflammatory to address potential tendonitis or bursitis.  - Ordered ultrasound of " right leg to evaluate soft tissue mass.    LEFT-SIDED SCIATICA:  - Noted that the patient reports sciatica pain starting 1 week ago.  - Confirmed that sciatica pain shoots from back down the leg.  - Acknowledged that the patient is visiting a chiropractor for sciatica management.    DEPRESSION:  - Continued Lexapro for depression management under the direction of Psychiatry    ATTENTION-DEFICIT HYPERACTIVITY DISORDER:  - Noted that the patient reports being 'all over' and 'everywhere'.  - Acknowledged that the patient underwent retesting for ADHD 2 weeks ago.  - Noted that the patient is waiting for ADHD medication to be prescribed.    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  - Noted that the patient is the sole caregiver for her mother.  - Noted that mother experienced increased confusion after a trip to [place].    FOLLOW-UP:  - Follow up in July with labs in mammogram prior to that visit.        This note was generated with the assistance of ambient listening technology. Verbal consent was obtained by the patient and accompanying visitor(s) for the recording of patient appointment to facilitate this note. I attest to having reviewed and edited the generated note for accuracy, though some syntax or spelling errors may persist. Please contact the author of this note for any clarification.

## 2025-04-21 RX ORDER — ESCITALOPRAM OXALATE 20 MG/1
20 TABLET ORAL DAILY
Qty: 30 TABLET | Refills: 1 | Status: SHIPPED | OUTPATIENT
Start: 2025-04-21 | End: 2026-04-21

## 2025-04-23 RX ORDER — TIRZEPATIDE 2.5 MG/.5ML
2.5 INJECTION, SOLUTION SUBCUTANEOUS
Qty: 2 ML | Refills: 1 | Status: CANCELLED | OUTPATIENT
Start: 2025-04-23

## 2025-04-23 NOTE — TELEPHONE ENCOUNTER
No care due was identified.  [facility] Embedded Care Due Messages. Reference number: [id number].   4/23/2025 3:43:57 PM CDT

## 2025-04-23 NOTE — TELEPHONE ENCOUNTER
Copied from CRM #[id number]. Topic: Medications - Medication Question  >> Apr 17, 2025 10:35 AM Dana wrote:  [facility]     Requesting  rx for 2.5   tirzepatide (MOUNJARO) 5      call-[phone]

## 2025-05-12 ENCOUNTER — HOSPITAL ENCOUNTER (OUTPATIENT)
Dept: RADIOLOGY | Facility: HOSPITAL | Age: 50
Discharge: HOME | End: 2025-05-12
Attending: FAMILY MEDICINE
Payer: COMMERCIAL

## 2025-05-12 DIAGNOSIS — R22.40 LOCALIZED SWELLING, MASS, OR LUMP OF LOWER EXTREMITY, UNSPECIFIED LATERALITY: ICD-10-CM

## 2025-05-12 PROCEDURE — 73720 MRI LWR EXTREMITY W/O&W/DYE: CPT | Mod: TC,PO,RT

## 2025-05-12 PROCEDURE — 25500020 PHARM REV CODE 255: Mod: PO | Performed by: FAMILY MEDICINE

## 2025-05-12 PROCEDURE — A9585 GADOBUTROL INJECTION: HCPCS | Mod: PO | Performed by: FAMILY MEDICINE

## 2025-05-12 PROCEDURE — 73720 MRI LWR EXTREMITY W/O&W/DYE: CPT | Mod: 26,RT,, | Performed by: RADIOLOGY

## 2025-05-12 RX ORDER — GADOBUTROL 604.72 MG/ML
7 INJECTION INTRAVENOUS
Status: COMPLETED | OUTPATIENT
Start: 2025-05-12 | End: 2025-05-12

## 2025-05-12 RX ADMIN — GADOBUTROL 7 ML: 604.72 INJECTION INTRAVENOUS at 01:05

## 2025-05-13 ENCOUNTER — PATIENT MESSAGE (OUTPATIENT)
Dept: FAMILY MEDICINE | Facility: CLINIC | Age: 50
End: 2025-05-13
Payer: COMMERCIAL

## 2025-05-13 DIAGNOSIS — R93.89 ABNORMAL MRI: Primary | ICD-10-CM

## 2025-05-15 ENCOUNTER — OFFICE VISIT (OUTPATIENT)
Dept: SURGERY | Facility: CLINIC | Age: 50
End: 2025-05-15
Payer: COMMERCIAL

## 2025-05-15 VITALS — TEMPERATURE: 98 F | HEART RATE: 99 BPM | DIASTOLIC BLOOD PRESSURE: 89 MMHG | SYSTOLIC BLOOD PRESSURE: 121 MMHG

## 2025-05-15 DIAGNOSIS — R93.89 ABNORMAL MRI: ICD-10-CM

## 2025-05-15 PROCEDURE — 99203 OFFICE O/P NEW LOW 30 MIN: CPT | Mod: S$GLB,,, | Performed by: STUDENT IN AN ORGANIZED HEALTH CARE EDUCATION/TRAINING PROGRAM

## 2025-05-15 PROCEDURE — 1159F MED LIST DOCD IN RCRD: CPT | Mod: CPTII,S$GLB,, | Performed by: STUDENT IN AN ORGANIZED HEALTH CARE EDUCATION/TRAINING PROGRAM

## 2025-05-15 PROCEDURE — 3074F SYST BP LT 130 MM HG: CPT | Mod: CPTII,S$GLB,, | Performed by: STUDENT IN AN ORGANIZED HEALTH CARE EDUCATION/TRAINING PROGRAM

## 2025-05-15 PROCEDURE — 99999 PR PBB SHADOW E&M-EST. PATIENT-LVL III: CPT | Mod: PBBFAC,,, | Performed by: STUDENT IN AN ORGANIZED HEALTH CARE EDUCATION/TRAINING PROGRAM

## 2025-05-15 PROCEDURE — 3079F DIAST BP 80-89 MM HG: CPT | Mod: CPTII,S$GLB,, | Performed by: STUDENT IN AN ORGANIZED HEALTH CARE EDUCATION/TRAINING PROGRAM

## 2025-05-15 PROCEDURE — 3044F HG A1C LEVEL LT 7.0%: CPT | Mod: CPTII,S$GLB,, | Performed by: STUDENT IN AN ORGANIZED HEALTH CARE EDUCATION/TRAINING PROGRAM

## 2025-05-15 NOTE — PROGRESS NOTES
History & Physical    Subjective     History of Present Illness:  Patient is a 49 y.o. female presents with right lateral thigh pain near the IT band.  She underwent subsequent imaging which showed a 1 cm, questionable lipoma.  This prompted an MRI of the thigh.  On the MR, she had a likely hemangioma identified medially in the vastus medialis.  She has no symptoms at this location.    Chief Complaint   Patient presents with    Consult       Review of patient's allergies indicates:   Allergen Reactions    Eggs [egg derived] Anaphylaxis    Crestor [rosuvastatin] Other (See Comments)       Current Medications[1]    Past Medical History:   Diagnosis Date    YAIR positive     Diabetes     Fatty liver     GERD (gastroesophageal reflux disease)     Hypertension     Sleep apnea     uses CPAP     Past Surgical History:   Procedure Laterality Date    BELT ABDOMINOPLASTY       SECTION      x 2    COLONOSCOPY N/A 2022    Procedure: COLONOSCOPY;  Surgeon: Ran Dickerson MD;  Location: [facility];  Service: Endoscopy;  Laterality: N/A;    NASAL SEPTUM SURGERY       Family History   Problem Relation Name Age of Onset    Ovarian cancer Maternal Grandmother      Hypertension Mother      Allergic rhinitis Neg Hx      Allergies Neg Hx      Angioedema Neg Hx      Asthma Neg Hx      Atopy Neg Hx      Eczema Neg Hx      Immunodeficiency Neg Hx      Rhinitis Neg Hx      Urticaria Neg Hx      Breast cancer Neg Hx      Uterine cancer Neg Hx      Cervical cancer Neg Hx       Social History[2]     Review of Systems:  Review of Systems   Constitutional:  Negative for fever.   HENT: Negative.     Eyes: Negative.    Respiratory: Negative.     Cardiovascular: Negative.    Gastrointestinal: Negative.    Endocrine: Negative.    Genitourinary: Negative.    Musculoskeletal:  Positive for myalgias.   Skin: Negative.    Allergic/Immunologic: Negative.    Neurological: Negative.    Hematological: Negative.    Psychiatric/Behavioral:  Negative.            Objective     Vital Signs (Most Recent)  Temp: 97.9 °F (36.6 °C) (05/15/25 1321)  Pulse: 99 (05/15/25 1321)  BP: 121/89 (05/15/25 1321)           Physical Exam:  Physical Exam  Constitutional:       General: She is not in acute distress.     Appearance: Normal appearance. She is not ill-appearing, toxic-appearing or diaphoretic.   HENT:      Head: Normocephalic.      Nose: Nose normal.   Eyes:      Conjunctiva/sclera: Conjunctivae normal.   Cardiovascular:      Rate and Rhythm: Normal rate and regular rhythm.   Pulmonary:      Effort: Pulmonary effort is normal.   Abdominal:      Palpations: Abdomen is soft.   Musculoskeletal:         General: Normal range of motion.      Cervical back: Normal range of motion.      Comments: No identifiable mass   Skin:     General: Skin is warm.   Neurological:      General: No focal deficit present.      Mental Status: She is alert.   Psychiatric:         Mood and Affect: Mood normal.           Di agnosic  Relevant imaging has been reviewed.  There is a possible lipoma versus fat density mass that is 1 cm laterally on ultrasound.  Medially in the vastus medialis, there is a small likely hemangioma.       Assessment and Plan   49-year-old female who underwent ultrasound imaging followed by MRI imaging for leg pain.  Discuss that her ultrasound shows a 1 cm likely benign lipoma or other fat density mass.  She has no symptoms medially at the site of a likely hemangioma.  Discuss that it was likely unnecessary to have an MRI in the 1st place.    PLAN:  If this is a hemangioma, would not recommend biopsy as the risk of bleeding is elevated.  We discussed the potential for follow up MRI imaging to assess stability or resolution given the unknown duration of the mass being present since she has no current symptoms.  Excision is desired, would likely recommend following up with Surgical Oncology or if the mass enlarges on subsequent imaging.  Follow up with me as  needed.                [1]   Current Outpatient Medications   Medication Sig Dispense Refill    blood sugar diagnostic Strp 1 each by Misc.(Non-Drug; Combo Route) route once daily. 30 strip 6    blood-glucose meter (BLOOD GLUCOSE MONITORING) kit Use as instructed 1 each 0    ergocalciferol, vitamin D2, (VITAMIN D ORAL) Take by mouth.      EScitalopram oxalate (LEXAPRO) 20 MG tablet Take 1 tablet (20 mg total) by mouth once daily. 30 tablet 1    meloxicam (MOBIC) 15 MG tablet Take 1 tablet (15 mg total) by mouth once daily. 30 tablet 0    methylphenidate HCl 36 MG CR tablet Take 1 tablet (36 mg total) by mouth every morning. 30 tablet 0    ondansetron (ZOFRAN) 8 MG tablet Take 1 tablet (8 mg total) by mouth every 8 (eight) hours as needed for Nausea. 30 tablet 1    spironolactone (ALDACTONE) 50 MG tablet Take 1 tablet (50 mg total) by mouth once daily. 90 tablet 1    tirzepatide (MOUNJARO) 5 mg/0.5 mL PnIj Inject 5 mg into the skin every 7 days. 4 Pen 0    [START ON 7/16/2025] tirzepatide 10 mg/0.5 mL PnIj Inject 10 mg into the skin every 7 days. 2 mL 0    [START ON 6/15/2025] tirzepatide 7.5 mg/0.5 mL PnIj Inject 7.5 mg into the skin every 7 days. 2 mL 0    EPINEPHrine (EPIPEN) 0.3 mg/0.3 mL AtIn Inject 0.3 mLs (0.3 mg total) into the muscle once. for 1 dose 2 each 0     No current facility-administered medications for this visit.   [2]   Social History  Tobacco Use    Smoking status: Never    Smokeless tobacco: Never   Substance Use Topics    Alcohol use: No     Comment: socially    Drug use: No

## 2025-05-22 ENCOUNTER — OFFICE VISIT (OUTPATIENT)
Dept: PSYCHIATRY | Facility: CLINIC | Age: 50
End: 2025-05-22
Payer: COMMERCIAL

## 2025-05-22 VITALS
SYSTOLIC BLOOD PRESSURE: 124 MMHG | HEART RATE: 82 BPM | WEIGHT: 175.81 LBS | DIASTOLIC BLOOD PRESSURE: 89 MMHG | BODY MASS INDEX: 29.29 KG/M2 | HEIGHT: 65 IN

## 2025-05-22 DIAGNOSIS — F33.1 MODERATE EPISODE OF RECURRENT MAJOR DEPRESSIVE DISORDER: ICD-10-CM

## 2025-05-22 DIAGNOSIS — F90.0 ATTENTION DEFICIT HYPERACTIVITY DISORDER (ADHD), PREDOMINANTLY INATTENTIVE TYPE: Primary | ICD-10-CM

## 2025-05-22 PROCEDURE — G2211 COMPLEX E/M VISIT ADD ON: HCPCS | Mod: S$GLB,,, | Performed by: PSYCHOLOGIST

## 2025-05-22 PROCEDURE — 3079F DIAST BP 80-89 MM HG: CPT | Mod: CPTII,S$GLB,, | Performed by: PSYCHOLOGIST

## 2025-05-22 PROCEDURE — 3044F HG A1C LEVEL LT 7.0%: CPT | Mod: CPTII,S$GLB,, | Performed by: PSYCHOLOGIST

## 2025-05-22 PROCEDURE — 99999 PR PBB SHADOW E&M-EST. PATIENT-LVL III: CPT | Mod: PBBFAC,,, | Performed by: PSYCHOLOGIST

## 2025-05-22 PROCEDURE — 3074F SYST BP LT 130 MM HG: CPT | Mod: CPTII,S$GLB,, | Performed by: PSYCHOLOGIST

## 2025-05-22 PROCEDURE — 1159F MED LIST DOCD IN RCRD: CPT | Mod: CPTII,S$GLB,, | Performed by: PSYCHOLOGIST

## 2025-05-22 PROCEDURE — 3008F BODY MASS INDEX DOCD: CPT | Mod: CPTII,S$GLB,, | Performed by: PSYCHOLOGIST

## 2025-05-22 PROCEDURE — 99214 OFFICE O/P EST MOD 30 MIN: CPT | Mod: S$GLB,,, | Performed by: PSYCHOLOGIST

## 2025-05-22 RX ORDER — ESCITALOPRAM OXALATE 20 MG/1
20 TABLET ORAL DAILY
Qty: 90 TABLET | Refills: 1 | Status: SHIPPED | OUTPATIENT
Start: 2025-05-22 | End: 2026-05-22

## 2025-05-22 RX ORDER — METHYLPHENIDATE HYDROCHLORIDE 36 MG/1
36 TABLET ORAL EVERY MORNING
Qty: 30 TABLET | Refills: 0 | Status: SHIPPED | OUTPATIENT
Start: 2025-05-22

## 2025-05-22 NOTE — LETTER
May 22, 2025        Gina Barrios MD  [id number]  [place] [address]             [place] [facility]  [address]  [place] [address]  Phone: [phone]   Patient: Ana Dorantes   MR Number: [id number]   YOB: 1975   Date of Visit: 5/22/2025       Dear Dr. Barrios:    Thank you for referring Ana Dorantes to me for evaluation. Below are the relevant portions of my assessment and plan of care.    Pt is stable on Lexapro 20mg once daily and Concerta 36mg Q AM. Will continue to monitor and follow.    If you have questions, please do not hesitate to call me. I look forward to following Ana along with you.    Sincerely,      Saul Cronin, PhD, MP           CC  No Recipients

## 2025-05-22 NOTE — PROGRESS NOTES
Outpatient Psychiatry Follow-Up Visit    Clinical Status of Patient: Outpatient (Ambulatory)  05/22/2025     Chief Complaint: 49 year old female presenting today for a follow-up.       Interval History and Content of Current Session:  Interim Events/Subjective Report/Content of Current Session:  follow-up appointment.    Pt is a 49 year old female with past psychiatric hx of depression, anxiety, and ADHD who presents for follow-up treatment. Pt is doing really well. Pt reported good, stable mood with no dips. Pt finds Concerta helpful for her attention without any side effects. Pt is caring for there mother who is doing well. Pt denied any other major changes or new stressors.    Past Psychiatric hx: Focalin    Past Medical hx:   Past Medical History:   Diagnosis Date    YAIR positive     Diabetes     Fatty liver     GERD (gastroesophageal reflux disease)     Hypertension     Sleep apnea     uses CPAP        Interim hx:  Medication changes last visit: started Concerta 36mg Q AM  Anxiety: mild - unchanged  Depression: mild - improved     Denies suicidal/homicidal ideations.  Denies hopelessness/worthlessness.    Denies auditory/visual hallucinations      Alcohol: minimal, infrequent  Drug: occasional THC cartridge use  Caffeine: minimal use  Tobacco: pt denied      Review of Systems   PSYCHIATRIC: Pertinent items are noted in the narrative.        CONSTITUTIONAL: weight stable    Past Medical, Family and Social History: The patient's past medical, family and social history have been reviewed and updated as appropriate within the electronic medical record. See encounter notes.     Current Psychiatric Medication:  Lexapro 20mg once daily and Concerta 36mg Q AM     Compliance: yes      Side effects: low libido     Risk Parameters:  Patient reports no suicidal ideation  Patient reports no homicidal ideation  Patient reports no self-injurious behavior  Patient reports no violent behavior     Exam (detailed: at least 9  elements; comprehensive: all 15 elements)   Constitutional  Vitals:  Most recent vital signs, dated less than 90 days prior to this appointment, were reviewed. Pulse:  [82]   BP: (124)/(89)       General:  unremarkable, age appropriate, casual attire, good eye contact, good rapport       Musculoskeletal  Muscle Strength/Tone:  no flaccidity, no tremor    Gait & Station:  normal      Psychiatric                       Speech:  normal tone, normal rate, rhythm, and volume   Mood & Affect:   Depressed, anxious         Thought Process:   Goal directed; Linear    Associations:   intact   Thought Content:   No SI/HI, delusions, or paranoia, no AV/VH   Insight & Judgement:   Good, adequate to circumstances   Orientation:   grossly intact; alert and oriented x 4    Memory:  intact for content of interview    Language:  grossly intact, can repeat    Attention Span  : Grossly intact for content of interview   Fund of Knowledge:   intact and appropriate to age and level of education        Assessment and Diagnosis   Status/Progress: improving     Impression: Pt appears to struggle with recurrent depression and secondary anxiety. Pt has also been evaluated and treated for ADHD in the past. Pt should continue in therapy, continue in psychiatric medication management, and increase her cardio exercise regimen.     Diagnosis(es):   1) Major Depressive Disorder, recurrent, moderate  2) ADHD, predominately inattentive type    Intervention/Counseling/Treatment Plan   Medication Management:      Continue Lexapro 20mg Q D and Concerta 36mg Q AM    2. Continue in counseling    3. Start exercise regimen     4. Call to report any worsening of symptoms or problems with the medication. Pt instructed to go to ER with thoughts of harming self, others    Psychotherapy:   Target symptoms: attention/concentration, depression  Why chosen therapy is appropriate versus another modality: CBT used; relevant to diagnosis, patient responds to this  modality  Outcome monitoring methods: self-report, observation  Therapeutic intervention type: Cognitive Behavioral Therapy  Topics discussed/themes: building skills sets for symptom management, symptom recognition, nutrition, exercise  The patient's response to the intervention is good  Patient's response to treatment is: good.   The patient's progress toward treatment goals: improving     Return to clinic: 6 month    -Cognitive-Behavioral/Supportive therapy and psychoeducation provided  -R/B/SE's of medications discussed with the pt who expresses understanding and chooses to take medications as prescribed.   -Pt instructed to call clinic, 911 or go to nearest emergency room if sxs worsen or pt is in   crisis. The pt expresses understanding.    Saul Cronin, PhD, MP     Visit today included increased complexity associated with the care of the episodic problem associated with mental health. Addressed and managed the longitudinal care of the patient due to the serious and/or complex mental health diagnosis.

## 2025-05-23 ENCOUNTER — PATIENT MESSAGE (OUTPATIENT)
Dept: FAMILY MEDICINE | Facility: CLINIC | Age: 50
End: 2025-05-23
Payer: COMMERCIAL

## 2025-05-23 DIAGNOSIS — R22.40 LOCALIZED SWELLING, MASS, OR LUMP OF LOWER EXTREMITY, UNSPECIFIED LATERALITY: Primary | ICD-10-CM

## 2025-06-18 NOTE — TELEPHONE ENCOUNTER
Care Due:                  Date            Visit Type   Department     Provider  --------------------------------------------------------------------------------                                MYCHART                              FOLLOWUP/OF  [facility] Gina Rogelmaryana  Last Visit: 04-      [facility] VISIT   MEDICINE       Anger                              EP -                              PRIMARY      [facility] Gina Rogelmaryana  Next Visit: 07-      CARE ([facility])   MEDICINE       Anger                                                            Last  Test          Frequency    Reason                     Performed    Due Date  --------------------------------------------------------------------------------    HBA1C.......  6 months...  tirzepatide..............  01- 07-    [employer] Embedded Care Due Messages. Reference number: [id number].   6/18/2025 12:18:26 AM CDT

## 2025-06-19 NOTE — TELEPHONE ENCOUNTER
Spoke with pt and explained that she is currently on the 7.5 mg but would be dispensed 2 ml of medication to get this dose. She was confused about mg vs ml - explained and pt understands now.

## 2025-07-02 DIAGNOSIS — E11.9 TYPE 2 DIABETES MELLITUS WITHOUT COMPLICATION: ICD-10-CM

## 2025-07-09 ENCOUNTER — PATIENT MESSAGE (OUTPATIENT)
Dept: FAMILY MEDICINE | Facility: CLINIC | Age: 50
End: 2025-07-09
Payer: COMMERCIAL

## 2025-07-10 ENCOUNTER — HOSPITAL ENCOUNTER (OUTPATIENT)
Dept: RADIOLOGY | Facility: HOSPITAL | Age: 50
Discharge: HOME | End: 2025-07-10
Attending: FAMILY MEDICINE
Payer: COMMERCIAL

## 2025-07-10 DIAGNOSIS — Z12.39 ENCOUNTER FOR SCREENING FOR MALIGNANT NEOPLASM OF BREAST, UNSPECIFIED SCREENING MODALITY: ICD-10-CM

## 2025-07-10 PROCEDURE — 77067 SCR MAMMO BI INCL CAD: CPT | Mod: 26,,, | Performed by: RADIOLOGY

## 2025-07-10 PROCEDURE — 77063 BREAST TOMOSYNTHESIS BI: CPT | Mod: 26,,, | Performed by: RADIOLOGY

## 2025-07-10 PROCEDURE — 77067 SCR MAMMO BI INCL CAD: CPT | Mod: TC,PO

## 2025-07-11 NOTE — TELEPHONE ENCOUNTER
Copied from CRM #[id number]. Topic: General Inquiry - Return Call  >> Jul 11, 2025  8:16 AM Elidia wrote:  Type:  Patient Returning Call    Who Called:pt  Who Left Message for Patient:Luna April  Does the patient know what this is regarding?:yes  Would the patient rather a call back or a response via [facility]? call  Best Call Back Number:[phone]    Additional Information:

## 2025-07-11 NOTE — TELEPHONE ENCOUNTER
No care due was identified.  [facility] Embedded Care Due Messages. Reference number: [id number].   7/11/2025 8:04:05 AM CDT

## 2025-07-12 RX ORDER — SPIRONOLACTONE 50 MG/1
TABLET, FILM COATED ORAL
Qty: 90 TABLET | Refills: 1 | Status: SHIPPED | OUTPATIENT
Start: 2025-07-12

## 2025-07-13 NOTE — TELEPHONE ENCOUNTER
Refill Decision Note   Ana Dorantes  is requesting a refill authorization.  Brief Assessment and Rationale for Refill:  Approve     Medication Therapy Plan:         Comments:     Note composed:10:01 PM 07/12/2025

## 2025-07-14 ENCOUNTER — PATIENT MESSAGE (OUTPATIENT)
Dept: FAMILY MEDICINE | Facility: CLINIC | Age: 50
End: 2025-07-14
Payer: COMMERCIAL

## 2025-07-19 ENCOUNTER — PATIENT MESSAGE (OUTPATIENT)
Dept: FAMILY MEDICINE | Facility: CLINIC | Age: 50
End: 2025-07-19
Payer: COMMERCIAL

## 2025-07-25 ENCOUNTER — OFFICE VISIT (OUTPATIENT)
Dept: FAMILY MEDICINE | Facility: CLINIC | Age: 50
End: 2025-07-25
Payer: COMMERCIAL

## 2025-07-25 VITALS
DIASTOLIC BLOOD PRESSURE: 78 MMHG | RESPIRATION RATE: 16 BRPM | OXYGEN SATURATION: 97 % | TEMPERATURE: 98 F | BODY MASS INDEX: 29.31 KG/M2 | HEART RATE: 77 BPM | WEIGHT: 175.94 LBS | SYSTOLIC BLOOD PRESSURE: 104 MMHG | HEIGHT: 65 IN

## 2025-07-25 DIAGNOSIS — E11.8 DIABETES MELLITUS TYPE 2 WITH COMPLICATIONS: Primary | ICD-10-CM

## 2025-07-25 DIAGNOSIS — Z63.6 CAREGIVER STRESS: ICD-10-CM

## 2025-07-25 PROCEDURE — 3074F SYST BP LT 130 MM HG: CPT | Mod: CPTII,S$GLB,, | Performed by: FAMILY MEDICINE

## 2025-07-25 PROCEDURE — 3008F BODY MASS INDEX DOCD: CPT | Mod: CPTII,S$GLB,, | Performed by: FAMILY MEDICINE

## 2025-07-25 PROCEDURE — 99215 OFFICE O/P EST HI 40 MIN: CPT | Mod: S$GLB,,, | Performed by: FAMILY MEDICINE

## 2025-07-25 PROCEDURE — 1159F MED LIST DOCD IN RCRD: CPT | Mod: CPTII,S$GLB,, | Performed by: FAMILY MEDICINE

## 2025-07-25 PROCEDURE — 3044F HG A1C LEVEL LT 7.0%: CPT | Mod: CPTII,S$GLB,, | Performed by: FAMILY MEDICINE

## 2025-07-25 PROCEDURE — 3066F NEPHROPATHY DOC TX: CPT | Mod: CPTII,S$GLB,, | Performed by: FAMILY MEDICINE

## 2025-07-25 PROCEDURE — 1160F RVW MEDS BY RX/DR IN RCRD: CPT | Mod: CPTII,S$GLB,, | Performed by: FAMILY MEDICINE

## 2025-07-25 PROCEDURE — 3061F NEG MICROALBUMINURIA REV: CPT | Mod: CPTII,S$GLB,, | Performed by: FAMILY MEDICINE

## 2025-07-25 PROCEDURE — 3078F DIAST BP <80 MM HG: CPT | Mod: CPTII,S$GLB,, | Performed by: FAMILY MEDICINE

## 2025-07-25 SDOH — SOCIAL DETERMINANTS OF HEALTH (SDOH): DEPENDENT RELATIVE NEEDING CARE AT HOME: Z63.6

## 2025-07-26 NOTE — PROGRESS NOTES
Subjective:       Patient ID: Ana Dorantes is a 50 y.o. female.    Chief Complaint: Follow-up    History of Present Illness    CHIEF COMPLAINT:  Patient presents today for follow up.    DIABETES MANAGEMENT:  A1C is 5.5, within normal range. Total cholesterol is 153, below target of 200. LDL cholesterol is 75, at goal. She is currently taking Mounjaro 7.5mg with interest to advance to 10mg for weight management purposes.  Of note, she has been taking Mounjaro since April but has not lost any weight.  She reports no noticeable differences with the medication to date. She experiences ongoing pharmacy supply challenges with [facility] and proactively orders medication one week in advance to ensure continuous supply. Previous medication trial included Ozempic, which was discontinued.    EXERCISE AND LIFESTYLE:  She acknowledges minimal lifestyle modifications and expresses intention to increase physical activity, specifically planning to visit the gym and go walking when her caregiver responsibilities decrease. She reports current exhaustion from caring for her mother which impacts her exercise routine and motivation to improve lifestyle and fitness. 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 She reports feeling exhausted by caregiving responsibilities. She acknowledges her mother is still active and happy, but anticipates increasing challenges as the disease progresses, particularly regarding potential wandering and mobility risks. She is proactively considering future care options such as in-home assistance or an assisted living facility with a dementia unit to manage her mother's evolving care needs while recognizing the potential emotional and physical strain of caregiving.         Review of Systems   Constitutional:  Negative for appetite change, chills, diaphoresis,  fatigue, fever and unexpected weight change.   HENT:  Negative for congestion, ear pain, postnasal drip, rhinorrhea, sinus pressure, sneezing, sore throat and trouble swallowing.    Eyes:  Negative for pain, discharge and visual disturbance.   Respiratory:  Negative for cough, chest tightness, shortness of breath and wheezing.    Cardiovascular:  Negative for chest pain, palpitations and leg swelling.   Gastrointestinal:  Negative for abdominal distention, abdominal pain, blood in stool, constipation, diarrhea, nausea and vomiting.   Skin:  Negative for rash.         Objective:      Physical Exam  Constitutional:       General: She is not in acute distress.     Appearance: Normal appearance. She is well-developed.   HENT:      Head: Normocephalic and atraumatic.      Right Ear: Hearing, tympanic membrane, ear canal and external ear normal.      Left Ear: Hearing, tympanic membrane, ear canal and external ear normal.      Nose: Nose normal.      Mouth/Throat:      Mouth: No oral lesions.      Pharynx: No oropharyngeal exudate or posterior oropharyngeal erythema.   Eyes:      General: Lids are normal. No scleral icterus.     Extraocular Movements: Extraocular movements intact.      Conjunctiva/sclera: Conjunctivae normal.      Pupils: Pupils are equal, round, and reactive to light.   Neck:      Thyroid: No thyroid mass or thyromegaly.      Vascular: No carotid bruit.   Cardiovascular:      Rate and Rhythm: Normal rate and regular rhythm. No extrasystoles are present.     Chest Wall: PMI is not displaced.      Pulses:           Dorsalis pedis pulses are 2+ on the right side and 2+ on the left side.        Posterior tibial pulses are 2+ on the right side and 2+ on the left side.      Heart sounds: Normal heart sounds. No murmur heard.     No gallop.   Pulmonary:      Effort: Pulmonary effort is normal. No accessory muscle usage or respiratory distress.      Breath sounds: Normal breath sounds.   Abdominal:      General:  "Bowel sounds are normal. There is no abdominal bruit.      Palpations: Abdomen is soft.      Tenderness: There is no abdominal tenderness. There is no rebound.   Musculoskeletal:      Cervical back: Normal range of motion and neck supple.      Right foot: No deformity.      Left foot: No deformity.   Feet:      Right foot:      Protective Sensation: 10 sites tested.  10 sites sensed.      Skin integrity: Warmth present. No ulcer or callus.      Left foot:      Protective Sensation: 10 sites tested.  10 sites sensed.      Skin integrity: Warmth present. No ulcer or callus.   Lymphadenopathy:      Head:      Right side of head: No submental or submandibular adenopathy.      Left side of head: No submental or submandibular adenopathy.      Cervical:      Right cervical: No superficial, deep or posterior cervical adenopathy.     Left cervical: No superficial, deep or posterior cervical adenopathy.      Upper Body:      Right upper body: No supraclavicular adenopathy.      Left upper body: No supraclavicular adenopathy.   Skin:     General: Skin is warm and dry.   Neurological:      Mental Status: She is alert and oriented to person, place, and time.       Blood pressure 104/78, pulse 77, temperature 98.4 °F (36.9 °C), resp. rate 16, height 5' 5" (1.651 m), weight 79.8 kg (175 lb 14.8 oz), last menstrual period 07/21/2025, SpO2 97%.Body mass index is 29.28 kg/m².            Assessment & Plan    1. Reviewed recent labs: A1C 5.5 (normal), total cholesterol 153 (goal <200), LDL 75 (at goal).  2. Assessed Mounjaro efficacy for weight loss and diabetes management.  3. Increased Mounjaro to 10 mg dose for 1 month and switched prescription to mail order pharmacy to address supply issues; considered increasing to 12.5 mg if no weight loss observed.  4. Discussed potential need for assisted living facility for mother with dementia, though not immediately necessary.  5. Noted perimenopausal stage, which may impact weight loss " efforts.  6. Performed foot exam.    TYPE 2 DIABETES MELLITUS:  - A1C level is 5.5, indicating normal glycemic control with current therapy.  - Increased Mounjaro to 10 mg dose for 1 month and switched prescription to mail order pharmacy to address supply issues.  - Discussed expected timeline for weight loss with Mounjaro and plan to increase to 12.5 mg if no results observed.  - Patient to contact office after taking last shot of current prescription to report weight loss progress.  - Follow up in 1 month if no weight loss occurs with 10 mg dose to consider increasing to 12.5 mg; otherwise continue current dose if weight loss is achieved.  - Explained normal cholesterol levels.  - Recommend regular ophthalmologic exam.  - Performed comprehensive diabetic foot exam.    OVERWEIGHT WITH A BMI OF 29.28:  - we will continue to escalate the Mounjaro  - she will let me know how she is tolerating it and what weight loss effects she has had with it after this next month on the 10 mg  - we can increase to 12.5 mg but if she does not seem weight loss after that we may consider modifying her planned with the Mounjaro    MENOPAUSAL AND FEMALE CLIMACTERIC STATES:  - Monitored patient's patrick-menopausal status, which is likely affecting weight loss efforts.  - Regular menstrual patterns continue.    CAREGIVER STRESS AND FAMILY SUPPORT:  - Patient is emotionally drained and exhausted due to caregiving responsibilities.  - Discussed impact on mental and physical health.  - Encouraged patient to prioritize self-care to maintain ability to care for mother, recommending at least 1 hour daily for activities like walking.    DEPENDENT RELATIVE WITH DEMENTIA:  - Monitored patient's mother's activity level, noting potential for increased wandering behavior as dementia progresses.  - Discussed options for assisted living facility as patient is considering this transition.  - Addressed mother's mental health and its potential impact on the  patient.   - is in the process of getting additional assistance with her mother's dementia team        This note was generated with the assistance of ambient listening technology. Verbal consent was obtained by the patient and accompanying visitor(s) for the recording of patient appointment to facilitate this note. I attest to having reviewed and edited the generated note for accuracy, though some syntax or spelling errors may persist. Please contact the author of this note for any clarification.      I spent 45 minutes on this encounter.  This time includes face-to-face time and non-face to face time including previsit chart review, obtaining and/or reviewing separately obtained history, documenting clinical information in the electronic or other health record, independently interpreting results and communicating results to the patient/family/caregiver, or care coordinator.

## 2025-08-25 RX ORDER — ESCITALOPRAM OXALATE 20 MG/1
20 TABLET ORAL DAILY
Qty: 30 TABLET | Refills: 1 | Status: SHIPPED | OUTPATIENT
Start: 2025-08-25